# Patient Record
Sex: FEMALE | Race: WHITE | Employment: UNEMPLOYED | URBAN - METROPOLITAN AREA
[De-identification: names, ages, dates, MRNs, and addresses within clinical notes are randomized per-mention and may not be internally consistent; named-entity substitution may affect disease eponyms.]

---

## 2017-01-19 ENCOUNTER — HOSPITAL ENCOUNTER (EMERGENCY)
Facility: HOSPITAL | Age: 57
Discharge: HOME | End: 2017-01-20
Attending: EMERGENCY MEDICINE
Payer: COMMERCIAL

## 2017-01-19 ENCOUNTER — APPOINTMENT (OUTPATIENT)
Dept: GENERAL RADIOLOGY | Facility: HOSPITAL | Age: 57
End: 2017-01-19
Payer: COMMERCIAL

## 2017-01-19 VITALS
SYSTOLIC BLOOD PRESSURE: 126 MMHG | DIASTOLIC BLOOD PRESSURE: 74 MMHG | RESPIRATION RATE: 16 BRPM | BODY MASS INDEX: 32.21 KG/M2 | WEIGHT: 225 LBS | OXYGEN SATURATION: 95 % | HEIGHT: 70 IN | HEART RATE: 67 BPM | TEMPERATURE: 99 F

## 2017-01-19 DIAGNOSIS — T14.8XXA ABRASION: ICD-10-CM

## 2017-01-19 DIAGNOSIS — S80.02XA CONTUSION OF LEFT KNEE, INITIAL ENCOUNTER: Primary | ICD-10-CM

## 2017-01-19 PROCEDURE — 99283 EMERGENCY DEPT VISIT LOW MDM: CPT

## 2017-01-19 PROCEDURE — 90471 IMMUNIZATION ADMIN: CPT

## 2017-01-19 PROCEDURE — 73560 X-RAY EXAM OF KNEE 1 OR 2: CPT

## 2017-01-19 RX ORDER — IBUPROFEN 600 MG/1
600 TABLET ORAL EVERY 8 HOURS PRN
Qty: 30 TABLET | Refills: 0 | Status: SHIPPED | OUTPATIENT
Start: 2017-01-19 | End: 2017-01-26

## 2017-01-19 RX ORDER — HYDROCODONE BITARTRATE AND ACETAMINOPHEN 5; 325 MG/1; MG/1
2 TABLET ORAL ONCE
Status: COMPLETED | OUTPATIENT
Start: 2017-01-19 | End: 2017-01-19

## 2017-01-20 NOTE — ED INITIAL ASSESSMENT (HPI)
Patient fell around [phone] pm trying to get into her car, landing on left knee. Denies feeling dizzy prior to fall.

## 2017-01-20 NOTE — ED PROVIDER NOTES
Patient Seen in: [facility] Emergency Department    History   Patient presents with:  Fall (musculoskeletal, neurologic)  Lower Extremity Injury (musculoskeletal)    Stated Complaint: FALL    HPI    The patient is a 58-year-old female complaining of Father 78     Lung cancer   • Other[other] [OTHER] Son      MVA   • Cancer Son    • Cancer Maternal Grandmother      Skin cancer   • Other[other] [OTHER] Maternal Grandfather      TB   • Heart Disorder Paternal Grandmother 76     Heart attack   • Psychiatr throughout abdomen. No CVA tenderness. Back: No midline step-offs. No midline tenderness. Extremities: No clavicle tenderness. Moderate sized contusion on the proximal tibia of the left lower extremity with a small superficial hemostatic abrasion.   No encounter  (primary encounter diagnosis)  Abrasion    Disposition:  Discharge    Follow-up:  Cam Guzman MD  [address] [place] [id number] [phone]    Call in 2 days        Medications Prescribed:  Discharge Medication List as

## 2017-03-19 ENCOUNTER — HOSPITAL ENCOUNTER (EMERGENCY)
Facility: HOSPITAL | Age: 57
Discharge: HOME | End: 2017-03-19
Attending: EMERGENCY MEDICINE
Payer: COMMERCIAL

## 2017-03-19 VITALS
BODY MASS INDEX: 34.8 KG/M2 | WEIGHT: 235 LBS | HEIGHT: 69 IN | HEART RATE: 74 BPM | SYSTOLIC BLOOD PRESSURE: 136 MMHG | OXYGEN SATURATION: 95 % | TEMPERATURE: 99 F | DIASTOLIC BLOOD PRESSURE: 68 MMHG | RESPIRATION RATE: 16 BRPM

## 2017-03-19 DIAGNOSIS — N30.00 ACUTE CYSTITIS WITHOUT HEMATURIA: Primary | ICD-10-CM

## 2017-03-19 LAB
ALBUMIN SERPL-MCNC: 4.1 G/DL (ref 3.5–4.8)
ALP LIVER SERPL-CCNC: 62 U/L (ref 46–118)
ALT SERPL-CCNC: 39 U/L (ref 14–54)
AST SERPL-CCNC: 22 U/L (ref 15–41)
BASOPHILS # BLD AUTO: 0.06 X10(3) UL (ref 0–0.1)
BASOPHILS NFR BLD AUTO: 0.7 %
BILIRUB SERPL-MCNC: 0.4 MG/DL (ref 0.1–2)
BILIRUB UR QL STRIP.AUTO: NEGATIVE
BUN BLD-MCNC: 16 MG/DL (ref 8–20)
CALCIUM BLD-MCNC: 9.9 MG/DL (ref 8.3–10.3)
CHLORIDE: 103 MMOL/L (ref 101–111)
CLARITY UR REFRACT.AUTO: CLEAR
CO2: 32 MMOL/L (ref 22–32)
CREAT BLD-MCNC: 0.75 MG/DL (ref 0.55–1.02)
EOSINOPHIL # BLD AUTO: 0.13 X10(3) UL (ref 0–0.3)
EOSINOPHIL NFR BLD AUTO: 1.4 %
ERYTHROCYTE [DISTWIDTH] IN BLOOD BY AUTOMATED COUNT: 12.5 % (ref 11.5–16)
GLUCOSE BLD-MCNC: 93 MG/DL (ref 70–99)
GLUCOSE UR STRIP.AUTO-MCNC: NEGATIVE MG/DL
HCT VFR BLD AUTO: 40.3 % (ref 34–50)
HGB BLD-MCNC: 14 G/DL (ref 12–16)
IMMATURE GRANULOCYTE COUNT: 0.03 X10(3) UL (ref 0–1)
IMMATURE GRANULOCYTE RATIO %: 0.3 %
KETONES UR STRIP.AUTO-MCNC: NEGATIVE MG/DL
LACTIC ACID: 1.3 MMOL/L (ref 0.5–2)
LACTIC ACID: 2.4 MMOL/L (ref 0.5–2)
LYMPHOCYTES # BLD AUTO: 2.93 X10(3) UL (ref 0.9–4)
LYMPHOCYTES NFR BLD AUTO: 31.8 %
M PROTEIN MFR SERPL ELPH: 7.3 G/DL (ref 6.1–8.3)
MCH RBC QN AUTO: 30.9 PG (ref 27–33.2)
MCHC RBC AUTO-ENTMCNC: 34.7 G/DL (ref 31–37)
MCV RBC AUTO: 89 FL (ref 81–100)
MONOCYTES # BLD AUTO: 0.56 X10(3) UL (ref 0.1–0.6)
MONOCYTES NFR BLD AUTO: 6.1 %
NEUTROPHIL ABS PRELIM: 5.5 X10 (3) UL (ref 1.3–6.7)
NEUTROPHILS # BLD AUTO: 5.5 X10(3) UL (ref 1.3–6.7)
NEUTROPHILS NFR BLD AUTO: 59.7 %
NITRITE UR QL STRIP.AUTO: POSITIVE
PH UR STRIP.AUTO: 6 [PH] (ref 4.5–8)
PLATELET # BLD AUTO: 344 10(3)UL (ref 150–450)
POTASSIUM SERPL-SCNC: 3.6 MMOL/L (ref 3.6–5.1)
PROT UR STRIP.AUTO-MCNC: NEGATIVE MG/DL
RBC # BLD AUTO: 4.53 X10(6)UL (ref 3.8–5.1)
RED CELL DISTRIBUTION WIDTH-SD: 40.7 FL (ref 35.1–46.3)
SODIUM SERPL-SCNC: 143 MMOL/L (ref 136–144)
SP GR UR STRIP.AUTO: 1.02 (ref 1–1.03)
UROBILINOGEN UR STRIP.AUTO-MCNC: <2 MG/DL
WBC # BLD AUTO: 9.2 X10(3) UL (ref 4–13)

## 2017-03-19 PROCEDURE — 87088 URINE BACTERIA CULTURE: CPT | Performed by: INTERNAL MEDICINE

## 2017-03-19 PROCEDURE — 80053 COMPREHEN METABOLIC PANEL: CPT | Performed by: EMERGENCY MEDICINE

## 2017-03-19 PROCEDURE — 36415 COLL VENOUS BLD VENIPUNCTURE: CPT

## 2017-03-19 PROCEDURE — 96366 THER/PROPH/DIAG IV INF ADDON: CPT

## 2017-03-19 PROCEDURE — 99284 EMERGENCY DEPT VISIT MOD MDM: CPT

## 2017-03-19 PROCEDURE — 87086 URINE CULTURE/COLONY COUNT: CPT | Performed by: INTERNAL MEDICINE

## 2017-03-19 PROCEDURE — 83605 ASSAY OF LACTIC ACID: CPT | Performed by: EMERGENCY MEDICINE

## 2017-03-19 PROCEDURE — 87040 BLOOD CULTURE FOR BACTERIA: CPT | Performed by: EMERGENCY MEDICINE

## 2017-03-19 PROCEDURE — 87186 SC STD MICRODIL/AGAR DIL: CPT | Performed by: INTERNAL MEDICINE

## 2017-03-19 PROCEDURE — 81001 URINALYSIS AUTO W/SCOPE: CPT | Performed by: EMERGENCY MEDICINE

## 2017-03-19 PROCEDURE — 96365 THER/PROPH/DIAG IV INF INIT: CPT

## 2017-03-19 PROCEDURE — 85025 COMPLETE CBC W/AUTO DIFF WBC: CPT | Performed by: EMERGENCY MEDICINE

## 2017-03-19 PROCEDURE — 87040 BLOOD CULTURE FOR BACTERIA: CPT | Performed by: INTERNAL MEDICINE

## 2017-03-19 RX ORDER — LEVOFLOXACIN 5 MG/ML
750 INJECTION, SOLUTION INTRAVENOUS ONCE
Status: COMPLETED | OUTPATIENT
Start: 2017-03-19 | End: 2017-03-19

## 2017-03-19 RX ORDER — LEVOFLOXACIN 500 MG/1
500 TABLET, FILM COATED ORAL DAILY
Qty: 7 TABLET | Refills: 0 | Status: SHIPPED | OUTPATIENT
Start: 2017-03-19 | End: 2017-03-21 | Stop reason: CLARIF

## 2017-03-19 RX ORDER — LORAZEPAM 1 MG/1
1 TABLET ORAL 2 TIMES DAILY PRN
Qty: 20 TABLET | Refills: 0 | Status: SHIPPED | OUTPATIENT
Start: 2017-03-19 | End: 2017-03-26

## 2017-03-19 NOTE — ED PROVIDER NOTES
Patient Seen in: [facility] Emergency Department    History   Patient presents with:  Urinary Symptoms (urologic)    Stated Complaint: urinary s/s, sent from immed care for iv abx    HPI    25-year-old female presents emergency department who has ur EXTRACT OR,  Take 1 capsule by mouth daily.        Family History   Problem Relation Age of Onset   • Cancer Father 78     Lung cancer   • Other[other] [OTHER] Son      MVA   • Cancer Son    • Cancer Maternal Grandmother      Skin cancer   • Other[other] [O sounds are present , no pulsatile mass  Extremities: No clubbing cyanosis or edema 2+ distal pulses. Neuro: Cranial nerves II through XII intact with no gross focal sensory or motor abnormality.       ED Course     Labs Reviewed   URINALYSIS WITH CULTURE R because of her mother is in the hospital on palliative care. She was hoping to possibly get a little something to help her sleep or help with stress. I told her I could give her a prescription for Ativan.   MDM       Patient was made aware of medical cond

## 2017-03-19 NOTE — PROGRESS NOTES
Pharmacy Note:   Antimicrobial Weight Dose Adjustment for: Leighton Castellanos has been prescribed Levaquin 500 mg IV x 1 in the ER. CrCl cannot be calculated (Patient has no serum creatinine result on file. ). Weight = 106. 6

## 2017-03-19 NOTE — ED INITIAL ASSESSMENT (HPI)
Urinary symptoms since yesterday; burning, frequency, urgency and pelvic pain. Hx of UTIs in the past. Went to [facility] after hours care and was sent here. Patient reports MD told her she may have bloodstream infection and needed IV Abx.

## 2017-07-07 ENCOUNTER — OFFICE VISIT (OUTPATIENT)
Dept: FAMILY MEDICINE CLINIC | Facility: CLINIC | Age: 57
End: 2017-07-07

## 2017-07-07 VITALS
WEIGHT: 241 LBS | OXYGEN SATURATION: 99 % | DIASTOLIC BLOOD PRESSURE: 70 MMHG | HEART RATE: 94 BPM | SYSTOLIC BLOOD PRESSURE: 130 MMHG | TEMPERATURE: 98 F | BODY MASS INDEX: 34.5 KG/M2 | HEIGHT: 70 IN | RESPIRATION RATE: 20 BRPM

## 2017-07-07 DIAGNOSIS — N30.90 BLADDER INFECTION: ICD-10-CM

## 2017-07-07 DIAGNOSIS — R30.0 DYSURIA: Primary | ICD-10-CM

## 2017-07-07 LAB
APPEARANCE: CLEAR
BILIRUBIN: NEGATIVE
GLUCOSE (URINE DIPSTICK): NEGATIVE MG/DL
KETONES (URINE DIPSTICK): NEGATIVE MG/DL
MULTISTIX LOT#: NORMAL NUMERIC
NITRITE, URINE: POSITIVE
PH, URINE: 6 (ref 4.5–8)
PROTEIN (URINE DIPSTICK): NEGATIVE MG/DL
SPECIFIC GRAVITY: 1.01 (ref 1–1.03)
URINE-COLOR: YELLOW
UROBILINOGEN,SEMI-QN: 0.2 MG/DL (ref 0–1.9)

## 2017-07-07 PROCEDURE — 87186 SC STD MICRODIL/AGAR DIL: CPT | Performed by: NURSE PRACTITIONER

## 2017-07-07 PROCEDURE — 99213 OFFICE O/P EST LOW 20 MIN: CPT | Performed by: NURSE PRACTITIONER

## 2017-07-07 PROCEDURE — 81003 URINALYSIS AUTO W/O SCOPE: CPT | Performed by: NURSE PRACTITIONER

## 2017-07-07 PROCEDURE — 87086 URINE CULTURE/COLONY COUNT: CPT | Performed by: NURSE PRACTITIONER

## 2017-07-07 PROCEDURE — 87088 URINE BACTERIA CULTURE: CPT | Performed by: NURSE PRACTITIONER

## 2017-07-07 RX ORDER — NITROFURANTOIN 25; 75 MG/1; MG/1
CAPSULE ORAL
Qty: 14 CAPSULE | Refills: 0 | Status: SHIPPED | OUTPATIENT
Start: 2017-07-07 | End: 2017-09-07 | Stop reason: ALTCHOICE

## 2017-07-07 NOTE — PROGRESS NOTES
CHIEF COMPLAINT:   Patient presents with:  Urinary Frequency: s/s since HS  Burning On Urination: took 1 tab of AMOX from previously prescribed      HPI:   Melanie Leon is a 59year old female who presents with symptoms of UTI.  Complaining of urinary Smoking status: Former Smoker                                                              Packs/day: 0.00      Years: 0.00      Smokeless tobacco: Never Used                      Alcohol use:  No                  REVIEW OF SYSTEMS:   GENERAL: Denies fever, - Will send urine culture and contact pt with results. - Meds as listed below. - Comfort measures as described in Patient Instructions. - Advised F/U visit if no improvement/worsening within 2-3 days.   To ER if ever moderate to severe abdominal/flan · Abdominal discomfort. This is usually in the lower abdomen above the pubic bone.   · Cloudy urine  · Strong- or bad-smelling urine  · Unable to urinate (urinary retention)  · Unable to hold urine in (urinary incontinence)  · Fever  · Loss of appetite  · C · If you are given phenazopydridine to reduce burning with urination, it will cause your urine to become a bright orange color. This can stain clothing.   Care and prevention  These self-care steps can help prevent future infections:  · Drink plenty of flui · Repeated vomiting, or unable to keep medicine down  · Weakness or dizziness  · Vaginal discharge  · Pain, redness, or swelling in the outer vaginal area (labia)  Date Last Reviewed: 10/1/2016  © 5586-5229 The [address]

## 2017-09-07 ENCOUNTER — OFFICE VISIT (OUTPATIENT)
Dept: FAMILY MEDICINE CLINIC | Facility: CLINIC | Age: 57
End: 2017-09-07

## 2017-09-07 VITALS
WEIGHT: 241 LBS | HEART RATE: 67 BPM | DIASTOLIC BLOOD PRESSURE: 70 MMHG | HEIGHT: 70 IN | SYSTOLIC BLOOD PRESSURE: 134 MMHG | RESPIRATION RATE: 18 BRPM | OXYGEN SATURATION: 98 % | BODY MASS INDEX: 34.5 KG/M2 | TEMPERATURE: 98 F

## 2017-09-07 DIAGNOSIS — N30.90 BLADDER INFECTION: Primary | ICD-10-CM

## 2017-09-07 LAB
MULTISTIX LOT#: NORMAL NUMERIC
PH, URINE: 6 (ref 4.5–8)
SPECIFIC GRAVITY: 1.01 (ref 1–1.03)
URINE-COLOR: YELLOW
UROBILINOGEN,SEMI-QN: 0.2 MG/DL (ref 0–1.9)

## 2017-09-07 PROCEDURE — 87086 URINE CULTURE/COLONY COUNT: CPT | Performed by: NURSE PRACTITIONER

## 2017-09-07 PROCEDURE — 87088 URINE BACTERIA CULTURE: CPT | Performed by: NURSE PRACTITIONER

## 2017-09-07 PROCEDURE — 87186 SC STD MICRODIL/AGAR DIL: CPT | Performed by: NURSE PRACTITIONER

## 2017-09-07 PROCEDURE — 99213 OFFICE O/P EST LOW 20 MIN: CPT | Performed by: NURSE PRACTITIONER

## 2017-09-07 PROCEDURE — 81003 URINALYSIS AUTO W/O SCOPE: CPT | Performed by: NURSE PRACTITIONER

## 2017-09-07 RX ORDER — NITROFURANTOIN 25; 75 MG/1; MG/1
CAPSULE ORAL
Qty: 14 CAPSULE | Refills: 0 | Status: SHIPPED | OUTPATIENT
Start: 2017-09-07 | End: 2017-11-19 | Stop reason: ALTCHOICE

## 2017-09-07 NOTE — PROGRESS NOTES
CHIEF COMPLAINT:   Patient presents with:  UTI: frequency and burning on urination, low back pain x 1 day       HPI:   Yinka Inman is a 59year old female who presents with symptoms of UTI.  Complaining of urinary frequency, urgency, slight low back p Packs/day: 0.00      Years: 0.00      Smokeless tobacco: Never Used                      Alcohol use: No                  REVIEW OF SYSTEMS:   GENERAL: Denies fever, chills, or body aches  SKIN: no rashes, no skin wounds or ulcers.   EYES:denies blurred - Last culture was multi drug resistant. Macrobid and bactrim were sensitive. Pt prefers the Macrobid for now- tolerates it well and feels she failed on bactrim a few times in the past.  - Comfort measures as described in Patient Instructions.     - Advis · Only a small amount of urine comes out  · Blood in urine  · Abdominal discomfort. This is usually in the lower abdomen above the pubic bone.   · Cloudy urine  · Strong- or bad-smelling urine  · Unable to urinate (urinary retention)  · Unable to hold urine · If you are given phenazopydridine to reduce burning with urination, it will cause your urine to become a bright orange color. This can stain clothing.   Care and prevention  These self-care steps can help prevent future infections:  · Drink plenty of flui · Repeated vomiting, or unable to keep medicine down  · Weakness or dizziness  · Vaginal discharge  · Pain, redness, or swelling in the outer vaginal area (labia)  Date Last Reviewed: 10/1/2016  © 1842-2421 The [address]

## 2017-09-14 ENCOUNTER — TELEPHONE (OUTPATIENT)
Dept: FAMILY MEDICINE CLINIC | Facility: CLINIC | Age: 57
End: 2017-09-14

## 2017-09-14 NOTE — TELEPHONE ENCOUNTER
Pt calling [facility], wondering about urine culture results from 1 week ago. Per chart, [facility] per pt she doesn't always check mychart. Informed of urine culture results and should finish out the macrobid.   Pt verbalizes understand

## 2017-11-19 ENCOUNTER — OFFICE VISIT (OUTPATIENT)
Dept: FAMILY MEDICINE CLINIC | Facility: CLINIC | Age: 57
End: 2017-11-19

## 2017-11-19 VITALS
HEART RATE: 87 BPM | RESPIRATION RATE: 20 BRPM | DIASTOLIC BLOOD PRESSURE: 76 MMHG | SYSTOLIC BLOOD PRESSURE: 122 MMHG | HEIGHT: 69 IN | WEIGHT: 241 LBS | TEMPERATURE: 99 F | OXYGEN SATURATION: 100 % | BODY MASS INDEX: 35.7 KG/M2

## 2017-11-19 DIAGNOSIS — R35.0 FREQUENT URINATION: ICD-10-CM

## 2017-11-19 DIAGNOSIS — N30.01 ACUTE CYSTITIS WITH HEMATURIA: Primary | ICD-10-CM

## 2017-11-19 PROCEDURE — 87086 URINE CULTURE/COLONY COUNT: CPT | Performed by: NURSE PRACTITIONER

## 2017-11-19 PROCEDURE — 81003 URINALYSIS AUTO W/O SCOPE: CPT | Performed by: NURSE PRACTITIONER

## 2017-11-19 PROCEDURE — 99213 OFFICE O/P EST LOW 20 MIN: CPT | Performed by: NURSE PRACTITIONER

## 2017-11-19 RX ORDER — NITROFURANTOIN 25; 75 MG/1; MG/1
100 CAPSULE ORAL 2 TIMES DAILY
Qty: 10 CAPSULE | Refills: 0 | Status: SHIPPED | OUTPATIENT
Start: 2017-11-19 | End: 2017-11-24

## 2017-11-19 RX ORDER — PHENAZOPYRIDINE HYDROCHLORIDE 200 MG/1
200 TABLET, FILM COATED ORAL 3 TIMES DAILY PRN
Qty: 9 TABLET | Refills: 0 | Status: SHIPPED | OUTPATIENT
Start: 2017-11-19 | End: 2017-12-26

## 2017-11-19 NOTE — PROGRESS NOTES
Magi Watts is a 59year old female. HPI:   Patient presents with symptoms of UTI for 2 days. Complaining of urinary frequency, urgency, dysuria, pos suprapubic pain, fever to 99.1,    Denies back pain, fever, hematuria.   Pt has history of UTI in REVIEW OF SYSTEMS:   GENERAL HEALTH: no  fever/chills or fatigue  SKIN: denies any unusual skin lesions or rashes  RESPIRATORY: no shortness of breath with exertion  CARDIOVASCULAR: denies chest pain on exertion and palpitations.   GI: no nausea or vomiting · Sexual activity. During sex, bacteria can travel from the penis, vagina, or rectum into the urethra.   · Bacteria on the skin outside the rectum may travel into the urethra.  This is more common in women since the rectum and urethra are closer to each oth

## 2017-11-19 NOTE — PATIENT INSTRUCTIONS
Understanding Urinary Tract Infections (UTIs)  Most UTIs are caused by bacteria, although they may also be caused by viruses or fungi.  Bacteria from the bowel are the most common source of infection. The infection may start because of any of the followin

## 2017-12-26 PROCEDURE — 87086 URINE CULTURE/COLONY COUNT: CPT | Performed by: FAMILY MEDICINE

## 2017-12-26 PROCEDURE — 87186 SC STD MICRODIL/AGAR DIL: CPT | Performed by: FAMILY MEDICINE

## 2017-12-26 PROCEDURE — 87088 URINE BACTERIA CULTURE: CPT | Performed by: FAMILY MEDICINE

## 2018-03-03 ENCOUNTER — HOSPITAL ENCOUNTER (EMERGENCY)
Facility: HOSPITAL | Age: 58
Discharge: HOME | End: 2018-03-03
Attending: EMERGENCY MEDICINE
Payer: COMMERCIAL

## 2018-03-03 ENCOUNTER — APPOINTMENT (OUTPATIENT)
Dept: ULTRASOUND IMAGING | Facility: HOSPITAL | Age: 58
End: 2018-03-03
Attending: EMERGENCY MEDICINE
Payer: COMMERCIAL

## 2018-03-03 ENCOUNTER — APPOINTMENT (OUTPATIENT)
Dept: GENERAL RADIOLOGY | Facility: HOSPITAL | Age: 58
End: 2018-03-03
Attending: EMERGENCY MEDICINE
Payer: COMMERCIAL

## 2018-03-03 VITALS
TEMPERATURE: 98 F | RESPIRATION RATE: 20 BRPM | OXYGEN SATURATION: 97 % | HEART RATE: 81 BPM | HEIGHT: 70 IN | DIASTOLIC BLOOD PRESSURE: 66 MMHG | SYSTOLIC BLOOD PRESSURE: 118 MMHG | WEIGHT: 235 LBS | BODY MASS INDEX: 33.64 KG/M2

## 2018-03-03 DIAGNOSIS — M71.21 SYNOVIAL CYST OF RIGHT POPLITEAL SPACE: Primary | ICD-10-CM

## 2018-03-03 PROCEDURE — 99284 EMERGENCY DEPT VISIT MOD MDM: CPT

## 2018-03-03 PROCEDURE — 93971 EXTREMITY STUDY: CPT | Performed by: EMERGENCY MEDICINE

## 2018-03-03 PROCEDURE — 73560 X-RAY EXAM OF KNEE 1 OR 2: CPT | Performed by: EMERGENCY MEDICINE

## 2018-03-03 NOTE — ED PROVIDER NOTES
Patient Seen in: [facility] Emergency Department    History   Patient presents with:  Lower Extremity Injury (musculoskeletal)    Stated Complaint: knee pain    HPI    Patient presents complaining of a one-week history of right knee pain surrounding popliteal region extending into the proximal calf. There is no distal edema noted. Distal CMS exam is intact. No joint instability is noted. There is no ecchymosis. There is no thigh or hip discomfort   Nursing note and vitals reviewed.            ED C

## 2018-03-03 NOTE — ED INITIAL ASSESSMENT (HPI)
60 y/o female to ED with c/o of right posterior knee pain x1 week approximately, patient denies injury. Reports dull pressure that turns to a sharp pain when getting up from chair/stool.

## 2018-03-14 PROCEDURE — 85652 RBC SED RATE AUTOMATED: CPT | Performed by: FAMILY MEDICINE

## 2018-03-14 PROCEDURE — 36415 COLL VENOUS BLD VENIPUNCTURE: CPT | Performed by: FAMILY MEDICINE

## 2018-04-18 PROBLEM — M17.11 PRIMARY OSTEOARTHRITIS OF RIGHT KNEE: Status: ACTIVE | Noted: 2018-04-18

## 2018-04-18 PROBLEM — M25.461 EFFUSION OF KNEE JOINT RIGHT: Status: ACTIVE | Noted: 2018-04-18

## 2018-05-11 ENCOUNTER — OFFICE VISIT (OUTPATIENT)
Dept: FAMILY MEDICINE CLINIC | Facility: CLINIC | Age: 58
End: 2018-05-11

## 2018-05-11 VITALS
TEMPERATURE: 99 F | RESPIRATION RATE: 20 BRPM | DIASTOLIC BLOOD PRESSURE: 76 MMHG | BODY MASS INDEX: 35.21 KG/M2 | OXYGEN SATURATION: 98 % | HEART RATE: 88 BPM | HEIGHT: 68.5 IN | SYSTOLIC BLOOD PRESSURE: 112 MMHG | WEIGHT: 235 LBS

## 2018-05-11 DIAGNOSIS — L30.9 DERMATITIS: Primary | ICD-10-CM

## 2018-05-11 PROCEDURE — 99213 OFFICE O/P EST LOW 20 MIN: CPT | Performed by: NURSE PRACTITIONER

## 2018-05-11 RX ORDER — CLOTRIMAZOLE AND BETAMETHASONE DIPROPIONATE 10; .64 MG/G; MG/G
1 CREAM TOPICAL 2 TIMES DAILY
Qty: 60 G | Refills: 0 | Status: SHIPPED | OUTPATIENT
Start: 2018-05-11 | End: 2018-05-25

## 2018-05-11 NOTE — PATIENT INSTRUCTIONS
Contact Dermatitis  Contact dermatitis is a skin rash caused by something that touches the skin and makes it irritated and inflamed. Your skin may be red, swollen, dry, and may be cracked. Blisters may form and ooze. The rash will itch.   Contact dermatit · You can also try wet dressings. One way to do this is to wear a wet piece of clothing under a dry one. Wear a damp shirt under a dry shirt if your upper body is affected. This can relieve itching and prevent you from scratching the affected area.   · You © 2108-6115 The [address] [facility] [place]. All rights reserved. This information is not intended as a substitute for professional medical care. Always follow your healthcare professional's instructions.

## 2018-05-11 NOTE — PROGRESS NOTES
CHIEF COMPLAINT:   Patient presents with:  Rash Skin Problem (integumentary): left underarm burning, red, and hot x1day         HPI:   Sailaja Penny is a 59year old female who presents for evaluation of a rash.   Per patient rash started in the past 1 Omeprazole 40 MG Oral Capsule Delayed Release Take 1 capsule (40 mg total) by mouth once daily. Disp: 90 capsule Rfl: 3   CRANBERRY EXTRACT OR Take 1 capsule by mouth daily.  Disp:  Rfl:       Past Medical History:   Diagnosis Date   • Back pain    • Other GENERAL: well developed, well nourished,in no apparent distress  SKIN: left axillar very erythematous with some scaling to edges of erythema, raised. NO vesicles, papules palpated. No masses, fluctance.    EYES: PERRLA, EOMI, conjunctiva are clear  HENT: He Talk with your healthcare provider about what may have caused the rash. A type of allergy testing called \"patch testing\" may be used to discover what you are allergic to.  You will need to avoid the source of your rash in the future to prevent it from com · Use oral diphenhydramine to help reduce itching. You can buy this antihistamine at drug and grocery stores. It can make you sleepy, so use lower doses during the daytime. Or you can use loratadine. This is an antihistamine that will not make you sleepy.

## 2018-05-22 PROBLEM — M87.9 OSTEONECROSIS OF RIGHT KNEE REGION (HCC): Status: ACTIVE | Noted: 2018-05-22

## 2018-05-22 PROBLEM — S83.241D ACUTE MEDIAL MENISCAL TEAR, RIGHT, SUBSEQUENT ENCOUNTER: Status: ACTIVE | Noted: 2018-05-22

## 2018-06-02 ENCOUNTER — HOSPITAL ENCOUNTER (EMERGENCY)
Facility: HOSPITAL | Age: 58
Discharge: HOME | End: 2018-06-02
Attending: EMERGENCY MEDICINE
Payer: COMMERCIAL

## 2018-06-02 VITALS
RESPIRATION RATE: 17 BRPM | WEIGHT: 240 LBS | TEMPERATURE: 98 F | SYSTOLIC BLOOD PRESSURE: 144 MMHG | BODY MASS INDEX: 34.36 KG/M2 | HEIGHT: 70 IN | HEART RATE: 85 BPM | DIASTOLIC BLOOD PRESSURE: 80 MMHG | OXYGEN SATURATION: 96 %

## 2018-06-02 DIAGNOSIS — N30.00 ACUTE CYSTITIS WITHOUT HEMATURIA: Primary | ICD-10-CM

## 2018-06-02 PROCEDURE — 81001 URINALYSIS AUTO W/SCOPE: CPT | Performed by: EMERGENCY MEDICINE

## 2018-06-02 PROCEDURE — 87186 SC STD MICRODIL/AGAR DIL: CPT | Performed by: EMERGENCY MEDICINE

## 2018-06-02 PROCEDURE — 87077 CULTURE AEROBIC IDENTIFY: CPT | Performed by: EMERGENCY MEDICINE

## 2018-06-02 PROCEDURE — 99283 EMERGENCY DEPT VISIT LOW MDM: CPT

## 2018-06-02 PROCEDURE — 87086 URINE CULTURE/COLONY COUNT: CPT | Performed by: EMERGENCY MEDICINE

## 2018-06-02 RX ORDER — SULFAMETHOXAZOLE AND TRIMETHOPRIM 800; 160 MG/1; MG/1
1 TABLET ORAL 2 TIMES DAILY
Qty: 14 TABLET | Refills: 0 | Status: SHIPPED | OUTPATIENT
Start: 2018-06-02 | End: 2018-06-09

## 2018-06-02 NOTE — ED PROVIDER NOTES
Patient Seen in: [facility] Emergency Department    History   Patient presents with:  Urinary Symptoms (urologic)    Stated Complaint: urinary tract infection    HPI    70-year-old female comes the hospital complaint of having 1 day history of havin LAD  Heart: S1S2 normal. No murmurs, regular rate and rhythm  Lungs: Clear to auscultation bilaterally  Abdomen: Soft nontender nondistended normal active bowel sounds without rebound, guarding or masses noted  Back nontender without CVA tenderness  Extrem [id number]  [phone]    Schedule an appointment as soon as possible for a visit in 2 days          Medications Prescribed:  Current Discharge Medication List    START taking these medications    Sulfamethoxazole-TMP -160 MG Oral Tab per tablet  Take 1

## 2018-06-02 NOTE — ED INITIAL ASSESSMENT (HPI)
Onset last night, progressive in nature. +Urinary frequency, burning, back ache noted. Denies fevers.

## 2018-06-25 ENCOUNTER — HOSPITAL ENCOUNTER (OUTPATIENT)
Dept: ULTRASOUND IMAGING | Age: 58
Discharge: HOME | End: 2018-06-25
Attending: ORTHOPAEDIC SURGERY
Payer: COMMERCIAL

## 2018-06-25 ENCOUNTER — HOSPITAL ENCOUNTER (EMERGENCY)
Facility: HOSPITAL | Age: 58
Discharge: HOME | End: 2018-06-25
Attending: EMERGENCY MEDICINE
Payer: COMMERCIAL

## 2018-06-25 ENCOUNTER — APPOINTMENT (OUTPATIENT)
Dept: GENERAL RADIOLOGY | Facility: HOSPITAL | Age: 58
End: 2018-06-25
Attending: EMERGENCY MEDICINE
Payer: COMMERCIAL

## 2018-06-25 VITALS
BODY MASS INDEX: 34.8 KG/M2 | DIASTOLIC BLOOD PRESSURE: 79 MMHG | HEART RATE: 64 BPM | RESPIRATION RATE: 18 BRPM | HEIGHT: 69 IN | SYSTOLIC BLOOD PRESSURE: 131 MMHG | WEIGHT: 235 LBS | OXYGEN SATURATION: 99 % | TEMPERATURE: 98 F

## 2018-06-25 DIAGNOSIS — M25.561 CHRONIC PAIN OF RIGHT KNEE: Primary | ICD-10-CM

## 2018-06-25 DIAGNOSIS — M79.661 RIGHT CALF PAIN: ICD-10-CM

## 2018-06-25 DIAGNOSIS — M25.561 POSTERIOR KNEE PAIN, RIGHT: ICD-10-CM

## 2018-06-25 DIAGNOSIS — G89.29 CHRONIC PAIN OF RIGHT KNEE: Primary | ICD-10-CM

## 2018-06-25 PROCEDURE — 99283 EMERGENCY DEPT VISIT LOW MDM: CPT

## 2018-06-25 PROCEDURE — 73562 X-RAY EXAM OF KNEE 3: CPT | Performed by: EMERGENCY MEDICINE

## 2018-06-25 PROCEDURE — 93971 EXTREMITY STUDY: CPT | Performed by: ORTHOPAEDIC SURGERY

## 2018-06-25 RX ORDER — IBUPROFEN 600 MG/1
600 TABLET ORAL ONCE
Status: COMPLETED | OUTPATIENT
Start: 2018-06-25 | End: 2018-06-25

## 2018-06-25 RX ORDER — HYDROCODONE BITARTRATE AND ACETAMINOPHEN 5; 325 MG/1; MG/1
1 TABLET ORAL ONCE
Status: COMPLETED | OUTPATIENT
Start: 2018-06-25 | End: 2018-06-25

## 2018-06-25 RX ORDER — TRAMADOL HYDROCHLORIDE 50 MG/1
TABLET ORAL EVERY 4 HOURS PRN
Qty: 10 TABLET | Refills: 0 | Status: SHIPPED | OUTPATIENT
Start: 2018-06-25 | End: 2018-07-02

## 2018-06-25 RX ORDER — IBUPROFEN 800 MG/1
800 TABLET ORAL EVERY 8 HOURS PRN
Qty: 30 TABLET | Refills: 0 | Status: SHIPPED | OUTPATIENT
Start: 2018-06-25 | End: 2018-07-02

## 2018-06-25 NOTE — ED INITIAL ASSESSMENT (HPI)
Patient has a history of osteonecrosis of the right knee  She wanted to give it some time to see if it would improve.  Today she walked to work and all of a sudden she could not bear weight

## 2018-06-25 NOTE — ED PROVIDER NOTES
Patient Seen in: [facility] Emergency Department    History   Patient presents with:  Lower Extremity Injury (musculoskeletal)    Stated Complaint: Reoccuring issues with right knee.     HPI    75-year-old white female with a past medical history of Systems    Positive for stated complaint: Reoccuring issues with right knee. Other systems are as noted in HPI. Constitutional and vital signs reviewed. All other systems reviewed and negative except as noted above.     Physical Exam   ED Triage Elida ibuprofen and 2 Norco here in the emergency department a prescription for tramadol for home but I do think that the likelihood of her having a DVT was very low given that she has no risk factors similar pain in the past with a similar presentation and a ne

## 2018-06-26 NOTE — PROGRESS NOTES
CONCLUSION:    1.  Negative DVT study. 2.  No significant change in appearance of a Baker's cyst and suprapatellar joint fluid.

## 2018-06-28 PROBLEM — M84.453A INSUFFICIENCY FRACTURE OF MEDIAL FEMORAL CONDYLE (HCC): Status: ACTIVE | Noted: 2018-06-28

## 2018-06-29 ENCOUNTER — OFFICE VISIT (OUTPATIENT)
Dept: FAMILY MEDICINE CLINIC | Facility: CLINIC | Age: 58
End: 2018-06-29

## 2018-06-29 VITALS
TEMPERATURE: 99 F | HEART RATE: 97 BPM | BODY MASS INDEX: 35 KG/M2 | WEIGHT: 235 LBS | OXYGEN SATURATION: 96 % | DIASTOLIC BLOOD PRESSURE: 84 MMHG | SYSTOLIC BLOOD PRESSURE: 132 MMHG | RESPIRATION RATE: 20 BRPM

## 2018-06-29 DIAGNOSIS — N30.01 ACUTE CYSTITIS WITH HEMATURIA: ICD-10-CM

## 2018-06-29 DIAGNOSIS — R39.9 UTI SYMPTOMS: Primary | ICD-10-CM

## 2018-06-29 PROCEDURE — 87086 URINE CULTURE/COLONY COUNT: CPT | Performed by: NURSE PRACTITIONER

## 2018-06-29 PROCEDURE — 99213 OFFICE O/P EST LOW 20 MIN: CPT | Performed by: NURSE PRACTITIONER

## 2018-06-29 PROCEDURE — 81003 URINALYSIS AUTO W/O SCOPE: CPT | Performed by: NURSE PRACTITIONER

## 2018-06-29 RX ORDER — NITROFURANTOIN 25; 75 MG/1; MG/1
100 CAPSULE ORAL 2 TIMES DAILY
Qty: 14 CAPSULE | Refills: 0 | Status: SHIPPED | OUTPATIENT
Start: 2018-06-29 | End: 2018-07-06

## 2018-06-29 RX ORDER — PHENAZOPYRIDINE HYDROCHLORIDE 200 MG/1
200 TABLET, FILM COATED ORAL 3 TIMES DAILY PRN
Qty: 6 TABLET | Refills: 0 | Status: SHIPPED | OUTPATIENT
Start: 2018-06-29 | End: 2018-07-01

## 2018-06-29 RX ORDER — NITROFURANTOIN 25; 75 MG/1; MG/1
100 CAPSULE ORAL 2 TIMES DAILY
Qty: 14 CAPSULE | Refills: 0 | Status: SHIPPED | OUTPATIENT
Start: 2018-06-29 | End: 2018-06-29 | Stop reason: CLARIF

## 2018-06-29 RX ORDER — SULFAMETHOXAZOLE AND TRIMETHOPRIM 800; 160 MG/1; MG/1
1 TABLET ORAL 2 TIMES DAILY
Qty: 10 TABLET | Refills: 0 | Status: SHIPPED | OUTPATIENT
Start: 2018-06-29 | End: 2018-06-29 | Stop reason: ALTCHOICE

## 2018-06-29 NOTE — PROGRESS NOTES
CHIEF COMPLAINT:   Patient presents with:  UTI: pain when urinating start at 4am,     HPI:   Tao Morataya is a 59year old female who presents with symptoms of UTI. Complaining of urinary frequency, urgency, dysuria for last 1 days.  Symptoms have be Diagnosis Date   • Back pain    • Other and unspecified hyperlipidemia    • Unspecified essential hypertension       Social History:  Smoking status: Former Smoker                                                              Packs/day: 0.00      Years: 0.0 Uti symptoms  (primary encounter diagnosis)  Acute cystitis with hematuria     1. UTI symptoms  - URINALYSIS, AUTO, W/O SCOPE  - Nitrofurantoin Monohyd Macro 100 MG Oral Cap; Take 1 capsule (100 mg total) by mouth 2 (two) times daily.   Dispense: 14 capsule The most common place for an infection is in the bladder. This is called a bladder infection. This is one of the most common infections in women. Most bladder infections are easily treated. They are not serious unless the infection spreads to the kidney. Bladder infections are diagnosed by a urine test. They are treated with antibiotics and usually clear up quickly without complications. Treatment helps prevent a more serious kidney infection.   Medicines  Medicines can help in the treatment of a bladder in Call your healthcare provider if all symptoms are not gone after 3 days of treatment. This is especially important if you have repeat infections. If a culture was done, you will be told if your treatment needs to be changed.  If directed, you can call to f

## 2018-07-01 ENCOUNTER — TELEPHONE (OUTPATIENT)
Dept: FAMILY MEDICINE CLINIC | Facility: CLINIC | Age: 58
End: 2018-07-01

## 2018-07-01 NOTE — TELEPHONE ENCOUNTER
Left detailed VM on phone number provided per HIPAA consent with info regarding negative urine culture and to discontinue antibiotic. Advised to follow up with PCP if continued symptoms. Call back number provided to further discuss.

## 2018-08-19 ENCOUNTER — HOSPITAL ENCOUNTER (EMERGENCY)
Facility: HOSPITAL | Age: 58
Discharge: HOME | End: 2018-08-20
Attending: EMERGENCY MEDICINE
Payer: COMMERCIAL

## 2018-08-19 ENCOUNTER — APPOINTMENT (OUTPATIENT)
Dept: GENERAL RADIOLOGY | Facility: HOSPITAL | Age: 58
End: 2018-08-19
Payer: COMMERCIAL

## 2018-08-19 DIAGNOSIS — S42.291A CLOSED FRACTURE OF HEAD OF RIGHT HUMERUS, INITIAL ENCOUNTER: Primary | ICD-10-CM

## 2018-08-19 PROCEDURE — 96375 TX/PRO/DX INJ NEW DRUG ADDON: CPT

## 2018-08-19 PROCEDURE — 96376 TX/PRO/DX INJ SAME DRUG ADON: CPT

## 2018-08-19 PROCEDURE — 73562 X-RAY EXAM OF KNEE 3: CPT

## 2018-08-19 PROCEDURE — 73060 X-RAY EXAM OF HUMERUS: CPT

## 2018-08-19 PROCEDURE — 96374 THER/PROPH/DIAG INJ IV PUSH: CPT

## 2018-08-19 PROCEDURE — 99285 EMERGENCY DEPT VISIT HI MDM: CPT

## 2018-08-19 PROCEDURE — 99284 EMERGENCY DEPT VISIT MOD MDM: CPT

## 2018-08-19 RX ORDER — MORPHINE SULFATE 4 MG/ML
INJECTION, SOLUTION INTRAMUSCULAR; INTRAVENOUS
Status: COMPLETED
Start: 2018-08-19 | End: 2018-08-20

## 2018-08-19 RX ORDER — HYDROCODONE BITARTRATE AND ACETAMINOPHEN 10; 325 MG/1; MG/1
1-2 TABLET ORAL EVERY 4 HOURS PRN
Qty: 20 TABLET | Refills: 0 | Status: SHIPPED | OUTPATIENT
Start: 2018-08-19 | End: 2018-08-26

## 2018-08-19 RX ORDER — DIAZEPAM 5 MG/ML
INJECTION, SOLUTION INTRAMUSCULAR; INTRAVENOUS EVERY 30 MIN PRN
Status: DISCONTINUED | OUTPATIENT
Start: 2018-08-19 | End: 2018-08-20

## 2018-08-19 RX ORDER — DIAZEPAM 5 MG/1
TABLET ORAL EVERY 8 HOURS PRN
Qty: 20 TABLET | Refills: 0 | Status: ON HOLD | OUTPATIENT
Start: 2018-08-19 | End: 2018-08-29

## 2018-08-19 RX ORDER — MORPHINE SULFATE 4 MG/ML
4 INJECTION, SOLUTION INTRAMUSCULAR; INTRAVENOUS ONCE
Status: COMPLETED | OUTPATIENT
Start: 2018-08-19 | End: 2018-08-19

## 2018-08-20 VITALS
RESPIRATION RATE: 17 BRPM | WEIGHT: 225 LBS | SYSTOLIC BLOOD PRESSURE: 149 MMHG | HEART RATE: 87 BPM | OXYGEN SATURATION: 94 % | TEMPERATURE: 98 F | HEIGHT: 69 IN | DIASTOLIC BLOOD PRESSURE: 78 MMHG | BODY MASS INDEX: 33.33 KG/M2

## 2018-08-20 PROBLEM — M23.321 DERANGEMENT OF POSTERIOR HORN OF MEDIAL MENISCUS OF RIGHT KNEE: Status: ACTIVE | Noted: 2018-08-20

## 2018-08-20 RX ORDER — MORPHINE SULFATE 4 MG/ML
4 INJECTION, SOLUTION INTRAMUSCULAR; INTRAVENOUS ONCE
Status: DISCONTINUED | OUTPATIENT
Start: 2018-08-20 | End: 2018-08-20

## 2018-08-20 NOTE — ED INITIAL ASSESSMENT (HPI)
Fall. Right knee gave out. Landed on right side. Complaining of right mid upper arm pain, right knee pain. Denies head, neck pain. Received 100 mcg fentanyl from EMS.

## 2018-08-20 NOTE — ED PROVIDER NOTES
Patient Seen in: [facility] Emergency Department    History   Patient presents with:  Fall (musculoskeletal, neurologic)    Stated Complaint: fall, right mid upper arm pain, right knee pain    HPI    Patient has a history of a torn meniscus in the r Neck: No JVD, no lymphadenopathy, no tenderness, swelling, deformity   Cardiovascular: Regular rate and rhythm, normal S1 and S2, no murmurs, rubs, or gallops   Lungs: Clear to auscultation bilaterally with equal breath sounds, no wheezing, no rhonchi needed (as needed for muscle spasm). , Print Script, Disp-20 tablet, R-0    HYDROcodone-acetaminophen  MG Oral Tab  Take 1-2 tablets by mouth every 4 (four) hours as needed for Pain., Print Script, Disp-20 tablet, R-0    !! - Potential duplicate medic

## 2018-08-27 ENCOUNTER — HOSPITAL ENCOUNTER (INPATIENT)
Facility: HOSPITAL | Age: 58
LOS: 1 days | Discharge: HOME | DRG: 149 | End: 2018-08-29
Admitting: INTERNAL MEDICINE
Payer: COMMERCIAL

## 2018-08-27 DIAGNOSIS — R42 DIZZINESS: Primary | ICD-10-CM

## 2018-08-27 DIAGNOSIS — M23.321 DERANGEMENT OF POSTERIOR HORN OF MEDIAL MENISCUS OF RIGHT KNEE: ICD-10-CM

## 2018-08-27 DIAGNOSIS — E87.6 HYPOKALEMIA: ICD-10-CM

## 2018-08-27 DIAGNOSIS — R29.6 MULTIPLE FALLS: ICD-10-CM

## 2018-08-27 DIAGNOSIS — S42.294A OTHER CLOSED NONDISPLACED FRACTURE OF PROXIMAL END OF RIGHT HUMERUS, INITIAL ENCOUNTER: ICD-10-CM

## 2018-08-27 DIAGNOSIS — S42.294D OTHER CLOSED NONDISPLACED FRACTURE OF PROXIMAL END OF RIGHT HUMERUS WITH ROUTINE HEALING, SUBSEQUENT ENCOUNTER: ICD-10-CM

## 2018-08-27 PROCEDURE — 85025 COMPLETE CBC W/AUTO DIFF WBC: CPT

## 2018-08-27 PROCEDURE — 93005 ELECTROCARDIOGRAM TRACING: CPT

## 2018-08-27 PROCEDURE — 85730 THROMBOPLASTIN TIME PARTIAL: CPT

## 2018-08-27 PROCEDURE — 84484 ASSAY OF TROPONIN QUANT: CPT

## 2018-08-27 PROCEDURE — 99285 EMERGENCY DEPT VISIT HI MDM: CPT

## 2018-08-27 PROCEDURE — 80053 COMPREHEN METABOLIC PANEL: CPT

## 2018-08-27 PROCEDURE — 96365 THER/PROPH/DIAG IV INF INIT: CPT

## 2018-08-27 PROCEDURE — 96375 TX/PRO/DX INJ NEW DRUG ADDON: CPT

## 2018-08-27 PROCEDURE — 85610 PROTHROMBIN TIME: CPT

## 2018-08-27 PROCEDURE — 93010 ELECTROCARDIOGRAM REPORT: CPT

## 2018-08-27 RX ORDER — MORPHINE SULFATE 4 MG/ML
2 INJECTION, SOLUTION INTRAMUSCULAR; INTRAVENOUS ONCE
Status: COMPLETED | OUTPATIENT
Start: 2018-08-27 | End: 2018-08-28

## 2018-08-28 ENCOUNTER — APPOINTMENT (OUTPATIENT)
Dept: CT IMAGING | Facility: HOSPITAL | Age: 58
DRG: 149 | End: 2018-08-28
Payer: COMMERCIAL

## 2018-08-28 ENCOUNTER — APPOINTMENT (OUTPATIENT)
Dept: GENERAL RADIOLOGY | Facility: HOSPITAL | Age: 58
DRG: 149 | End: 2018-08-28
Payer: COMMERCIAL

## 2018-08-28 PROBLEM — S42.294D OTHER CLOSED NONDISPLACED FRACTURE OF PROXIMAL END OF RIGHT HUMERUS WITH ROUTINE HEALING, SUBSEQUENT ENCOUNTER: Status: ACTIVE | Noted: 2018-08-28

## 2018-08-28 PROBLEM — E87.6 HYPOKALEMIA: Status: ACTIVE | Noted: 2018-08-28

## 2018-08-28 PROBLEM — R29.6 MULTIPLE FALLS: Status: ACTIVE | Noted: 2018-08-28

## 2018-08-28 LAB
ALBUMIN SERPL-MCNC: 3.9 G/DL (ref 3.5–4.8)
ALBUMIN/GLOB SERPL: 1.1 {RATIO} (ref 1–2)
ALP LIVER SERPL-CCNC: 79 U/L (ref 46–118)
ALT SERPL-CCNC: 34 U/L (ref 14–54)
ANION GAP SERPL CALC-SCNC: 8 MMOL/L (ref 0–18)
ANION GAP SERPL CALC-SCNC: 8 MMOL/L (ref 0–18)
APTT PPP: 28.2 SECONDS (ref 26.1–34.6)
AST SERPL-CCNC: 17 U/L (ref 15–41)
ATRIAL RATE: 102 BPM
BASOPHILS # BLD AUTO: 0.03 X10(3) UL (ref 0–0.1)
BASOPHILS # BLD AUTO: 0.04 X10(3) UL (ref 0–0.1)
BASOPHILS NFR BLD AUTO: 0.3 %
BASOPHILS NFR BLD AUTO: 0.4 %
BILIRUB SERPL-MCNC: 0.5 MG/DL (ref 0.1–2)
BILIRUB UR QL STRIP.AUTO: NEGATIVE
BUN BLD-MCNC: 13 MG/DL (ref 8–20)
BUN BLD-MCNC: 15 MG/DL (ref 8–20)
BUN/CREAT SERPL: 16.3 (ref 10–20)
BUN/CREAT SERPL: 20.5 (ref 10–20)
CALCIUM BLD-MCNC: 9.4 MG/DL (ref 8.3–10.3)
CALCIUM BLD-MCNC: 9.5 MG/DL (ref 8.3–10.3)
CHLORIDE SERPL-SCNC: 97 MMOL/L (ref 101–111)
CHLORIDE SERPL-SCNC: 99 MMOL/L (ref 101–111)
CO2 SERPL-SCNC: 32 MMOL/L (ref 22–32)
CO2 SERPL-SCNC: 33 MMOL/L (ref 22–32)
COLOR UR AUTO: YELLOW
CREAT BLD-MCNC: 0.73 MG/DL (ref 0.55–1.02)
CREAT BLD-MCNC: 0.8 MG/DL (ref 0.55–1.02)
EOSINOPHIL # BLD AUTO: 0.17 X10(3) UL (ref 0–0.3)
EOSINOPHIL # BLD AUTO: 0.19 X10(3) UL (ref 0–0.3)
EOSINOPHIL NFR BLD AUTO: 1.6 %
EOSINOPHIL NFR BLD AUTO: 2 %
ERYTHROCYTE [DISTWIDTH] IN BLOOD BY AUTOMATED COUNT: 13.2 % (ref 11.5–16)
ERYTHROCYTE [DISTWIDTH] IN BLOOD BY AUTOMATED COUNT: 13.3 % (ref 11.5–16)
GLOBULIN PLAS-MCNC: 3.5 G/DL (ref 2.5–4)
GLUCOSE BLD-MCNC: 107 MG/DL (ref 70–99)
GLUCOSE BLD-MCNC: 148 MG/DL (ref 70–99)
GLUCOSE UR STRIP.AUTO-MCNC: NEGATIVE MG/DL
HCT VFR BLD AUTO: 36.8 % (ref 34–50)
HCT VFR BLD AUTO: 38 % (ref 34–50)
HGB BLD-MCNC: 12.4 G/DL (ref 12–16)
HGB BLD-MCNC: 13 G/DL (ref 12–16)
IMMATURE GRANULOCYTE COUNT: 0.04 X10(3) UL (ref 0–1)
IMMATURE GRANULOCYTE COUNT: 0.05 X10(3) UL (ref 0–1)
IMMATURE GRANULOCYTE RATIO %: 0.4 %
IMMATURE GRANULOCYTE RATIO %: 0.5 %
INR BLD: 0.99 (ref 0.9–1.1)
KETONES UR STRIP.AUTO-MCNC: NEGATIVE MG/DL
LYMPHOCYTES # BLD AUTO: 2.59 X10(3) UL (ref 0.9–4)
LYMPHOCYTES # BLD AUTO: 2.74 X10(3) UL (ref 0.9–4)
LYMPHOCYTES NFR BLD AUTO: 25.8 %
LYMPHOCYTES NFR BLD AUTO: 27.8 %
M PROTEIN MFR SERPL ELPH: 7.4 G/DL (ref 6.1–8.3)
MCH RBC QN AUTO: 30.2 PG (ref 27–33.2)
MCH RBC QN AUTO: 30.2 PG (ref 27–33.2)
MCHC RBC AUTO-ENTMCNC: 33.7 G/DL (ref 31–37)
MCHC RBC AUTO-ENTMCNC: 34.2 G/DL (ref 31–37)
MCV RBC AUTO: 88.2 FL (ref 81–100)
MCV RBC AUTO: 89.5 FL (ref 81–100)
MONOCYTES # BLD AUTO: 0.57 X10(3) UL (ref 0.1–1)
MONOCYTES # BLD AUTO: 0.73 X10(3) UL (ref 0.1–1)
MONOCYTES NFR BLD AUTO: 5.4 %
MONOCYTES NFR BLD AUTO: 7.8 %
NEUTROPHIL ABS PRELIM: 5.75 X10 (3) UL (ref 1.3–6.7)
NEUTROPHIL ABS PRELIM: 7.04 X10 (3) UL (ref 1.3–6.7)
NEUTROPHILS # BLD AUTO: 5.75 X10(3) UL (ref 1.3–6.7)
NEUTROPHILS # BLD AUTO: 7.04 X10(3) UL (ref 1.3–6.7)
NEUTROPHILS NFR BLD AUTO: 61.7 %
NEUTROPHILS NFR BLD AUTO: 66.3 %
NITRITE UR QL STRIP.AUTO: NEGATIVE
OSMOLALITY SERPL CALC.SUM OF ELEC: 289 MOSM/KG (ref 275–295)
OSMOLALITY SERPL CALC.SUM OF ELEC: 289 MOSM/KG (ref 275–295)
P AXIS: 4 DEGREES
P-R INTERVAL: 136 MS
PH UR STRIP.AUTO: 6 [PH] (ref 4.5–8)
PLATELET # BLD AUTO: 334 10(3)UL (ref 150–450)
PLATELET # BLD AUTO: 370 10(3)UL (ref 150–450)
POTASSIUM SERPL-SCNC: 2.7 MMOL/L (ref 3.6–5.1)
POTASSIUM SERPL-SCNC: 3.1 MMOL/L (ref 3.6–5.1)
POTASSIUM SERPL-SCNC: 3.1 MMOL/L (ref 3.6–5.1)
POTASSIUM SERPL-SCNC: 3.2 MMOL/L (ref 3.6–5.1)
PROT UR STRIP.AUTO-MCNC: NEGATIVE MG/DL
PSA SERPL DL<=0.01 NG/ML-MCNC: 13.5 SECONDS (ref 12.4–14.7)
Q-T INTERVAL: 358 MS
QRS DURATION: 88 MS
QTC CALCULATION (BEZET): 466 MS
R AXIS: -32 DEGREES
RBC # BLD AUTO: 4.11 X10(6)UL (ref 3.8–5.1)
RBC # BLD AUTO: 4.31 X10(6)UL (ref 3.8–5.1)
RBC UR QL AUTO: NEGATIVE
RED CELL DISTRIBUTION WIDTH-SD: 42.5 FL (ref 35.1–46.3)
RED CELL DISTRIBUTION WIDTH-SD: 43.3 FL (ref 35.1–46.3)
SODIUM SERPL-SCNC: 138 MMOL/L (ref 136–144)
SODIUM SERPL-SCNC: 139 MMOL/L (ref 136–144)
SP GR UR STRIP.AUTO: 1.02 (ref 1–1.03)
T AXIS: 76 DEGREES
TROPONIN I SERPL-MCNC: <0.046 NG/ML (ref ?–0.05)
UROBILINOGEN UR STRIP.AUTO-MCNC: 2 MG/DL
VENTRICULAR RATE: 102 BPM
WBC # BLD AUTO: 10.6 X10(3) UL (ref 4–13)
WBC # BLD AUTO: 9.3 X10(3) UL (ref 4–13)

## 2018-08-28 PROCEDURE — 81001 URINALYSIS AUTO W/SCOPE: CPT | Performed by: HOSPITALIST

## 2018-08-28 PROCEDURE — 71045 X-RAY EXAM CHEST 1 VIEW: CPT

## 2018-08-28 PROCEDURE — 80048 BASIC METABOLIC PNL TOTAL CA: CPT | Performed by: HOSPITALIST

## 2018-08-28 PROCEDURE — 70450 CT HEAD/BRAIN W/O DYE: CPT

## 2018-08-28 PROCEDURE — 71275 CT ANGIOGRAPHY CHEST: CPT

## 2018-08-28 PROCEDURE — 85025 COMPLETE CBC W/AUTO DIFF WBC: CPT | Performed by: HOSPITALIST

## 2018-08-28 PROCEDURE — 97535 SELF CARE MNGMENT TRAINING: CPT

## 2018-08-28 PROCEDURE — 97165 OT EVAL LOW COMPLEX 30 MIN: CPT

## 2018-08-28 PROCEDURE — 84132 ASSAY OF SERUM POTASSIUM: CPT | Performed by: HOSPITALIST

## 2018-08-28 PROCEDURE — 97161 PT EVAL LOW COMPLEX 20 MIN: CPT

## 2018-08-28 PROCEDURE — 97116 GAIT TRAINING THERAPY: CPT

## 2018-08-28 PROCEDURE — 87086 URINE CULTURE/COLONY COUNT: CPT | Performed by: HOSPITALIST

## 2018-08-28 RX ORDER — DOCUSATE SODIUM 100 MG/1
100 CAPSULE, LIQUID FILLED ORAL 2 TIMES DAILY
Status: DISCONTINUED | OUTPATIENT
Start: 2018-08-28 | End: 2018-08-29

## 2018-08-28 RX ORDER — TRAZODONE HYDROCHLORIDE 50 MG/1
50 TABLET ORAL NIGHTLY
Status: DISCONTINUED | OUTPATIENT
Start: 2018-08-28 | End: 2018-08-29

## 2018-08-28 RX ORDER — HYDROXYZINE HYDROCHLORIDE 25 MG/1
25 TABLET, FILM COATED ORAL 3 TIMES DAILY PRN
Status: DISCONTINUED | OUTPATIENT
Start: 2018-08-28 | End: 2018-08-29

## 2018-08-28 RX ORDER — SENNOSIDES 8.6 MG
8.6 TABLET ORAL 2 TIMES DAILY PRN
Status: DISCONTINUED | OUTPATIENT
Start: 2018-08-28 | End: 2018-08-29

## 2018-08-28 RX ORDER — HYDROCODONE BITARTRATE AND ACETAMINOPHEN 10; 325 MG/1; MG/1
1 TABLET ORAL EVERY 4 HOURS PRN
Status: DISCONTINUED | OUTPATIENT
Start: 2018-08-28 | End: 2018-08-29

## 2018-08-28 RX ORDER — HEPARIN SODIUM 5000 [USP'U]/ML
5000 INJECTION, SOLUTION INTRAVENOUS; SUBCUTANEOUS EVERY 8 HOURS SCHEDULED
Status: DISCONTINUED | OUTPATIENT
Start: 2018-08-28 | End: 2018-08-29

## 2018-08-28 RX ORDER — POTASSIUM CHLORIDE 20 MEQ/1
40 TABLET, EXTENDED RELEASE ORAL EVERY 4 HOURS
Status: COMPLETED | OUTPATIENT
Start: 2018-08-28 | End: 2018-08-28

## 2018-08-28 RX ORDER — IBUPROFEN 400 MG/1
400 TABLET ORAL EVERY 6 HOURS PRN
Status: DISCONTINUED | OUTPATIENT
Start: 2018-08-28 | End: 2018-08-29

## 2018-08-28 RX ORDER — ATORVASTATIN CALCIUM 20 MG/1
20 TABLET, FILM COATED ORAL DAILY
Status: DISCONTINUED | OUTPATIENT
Start: 2018-08-28 | End: 2018-08-29

## 2018-08-28 RX ORDER — BENAZEPRIL/HYDROCHLOROTHIAZIDE 20 MG-25MG
1 TABLET ORAL DAILY
Status: DISCONTINUED | OUTPATIENT
Start: 2018-08-28 | End: 2018-08-28 | Stop reason: RX

## 2018-08-28 RX ORDER — DIAZEPAM 2 MG/1
2 TABLET ORAL EVERY 12 HOURS PRN
Status: DISCONTINUED | OUTPATIENT
Start: 2018-08-28 | End: 2018-08-29

## 2018-08-28 RX ORDER — DIAZEPAM 5 MG/1
5 TABLET ORAL EVERY 6 HOURS PRN
Status: DISCONTINUED | OUTPATIENT
Start: 2018-08-28 | End: 2018-08-28

## 2018-08-28 RX ORDER — SODIUM CHLORIDE 9 MG/ML
INJECTION, SOLUTION INTRAVENOUS ONCE
Status: COMPLETED | OUTPATIENT
Start: 2018-08-28 | End: 2018-08-28

## 2018-08-28 RX ORDER — PANTOPRAZOLE SODIUM 40 MG/1
40 TABLET, DELAYED RELEASE ORAL
Status: DISCONTINUED | OUTPATIENT
Start: 2018-08-28 | End: 2018-08-29

## 2018-08-28 NOTE — H&P
[facility] Hospitalist H&P       CC: Patient presents with:  Pain (neurologic)       PCP: Pete Gilman MD    History of Present Illness: Patient is a 59year old female with PMH sig for R humeral fracture p/w uncontrolled R shoulder pain and recurrent fa HYDROcodone-acetaminophen (NORCO)  MG Oral Tab Take 1 tablet by mouth every 4 (four) hours as needed for Pain. Disp: 20 tablet Rfl: 0    Diclofenac Sodium 75 MG Oral Tab EC Take 1 tablet (75 mg total) by mouth 2 (two) times daily.  Disp: 60 tablet R (Oral)   Resp 19   Wt 241 lb 6.5 oz (109.5 kg)   SpO2 96%   BMI 35.65 kg/m²   General:  Alert, no distress, appears stated age. Head:  Normocephalic, without obvious abnormality, atraumatic. Eyes:  Sclera anicteric, No conjunctival pallor, EOMs intact. transcribed by Technologist)  Right knee gave out and patient landed on right arm, anterior right knee pain, right arm pain. FINDINGS:   No evidence of fracture or dislocation  Moderate suprapatellar joint effusion.   Moderate loss of medial joint space no intraparenchymal brain abnormalities. There is nothing specific for acute infarct. There is no hemorrhage or mass lesion. SINUSES:           No sign of acute sinusitis. MASTOIDS:          No sign of acute inflammation.  SKULL:             No evidence CHEST WALL:  No mass or axillary adenopathy. LIMITED ABDOMEN:  Indeterminate 18 mm right adrenal nodule. Fatty liver. BONES:  Note is made of degenerative changes present in the spine. OTHER:   embolization coils in the middle lobe.        CONCLUSION:  N femoral condyle and medial tibial plateau which has progressed since prior exam extending to involve the articular surface of the medial femoral condyle . IMPRESSION: 1. Osteoarthritis of the medial compartment.  2. Medial meniscal tear with medial menis Fracture  · CXR 8/19: R humeral fracture, distally neurovascularly intact  · Immobilization recommended  · cnt f/u Ortho     # Uncontrolled pain  · Ran out of [facility] outpatient  · recc new refill of Norco w bowel regimen, limit to Q4-6H PRN    # Dizziness

## 2018-08-28 NOTE — PLAN OF CARE
Patient admitted from ED. Oriented to the unit and the room. Family at beside. Admission navigator completed. Orders put in per hospitalist. PRN norco given for pain to to right arm. Up with 1 person assist to the bathroom.  Bed alarm on. 3L nasal canula an

## 2018-08-28 NOTE — PROGRESS NOTES
08/28/18 1125 08/28/18 1127 08/28/18 1129   Vital Signs   Pulse 93 93 105   /78 132/74 128/81   BP Location Left arm Left arm Left arm   BP Method Automatic Automatic Automatic   Patient Position Lying Sitting Standing

## 2018-08-28 NOTE — PHYSICAL THERAPY NOTE
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 ..   -   Moving to and from a bed to a chair (including a wheelchair)?: A Little   -   Need to walk in hospital room?: [address] subsequent R proximal humeral fx being treated conservatively, recent diagnosis R medial meniscus tear s/p steroid injection.   In this PT evaluation, the patient presents with the following impairments decreased AROM/srtength R knee, edema/pain R knee, epi

## 2018-08-28 NOTE — PLAN OF CARE
Assumed patient care at 0730.  Vital signs stable   Pain to right arm relieved with Norco. Sling to arm   Orthostatic BP done, slightly dizzy upon standing   250 cc bolus done   K replaced   Plan of care discussed, will continue to monitor       1800- Patie

## 2018-08-28 NOTE — ED PROVIDER NOTES
Patient Seen in: [facility] Emergency Department    History   Patient presents with:  Pain (neurologic)    Stated Complaint: PAIN     HPI    25-year-old obese female presents to ER with right shoulder pain. She fractured it a few weeks ago.   She wa [08/28/18 0003]    Current:/89   Pulse 98   Resp 16   SpO2 97%         Physical Exam    GENERAL APPEARANCE:     Well developed, well nourished, alert       Head: Normocephalic and atraumatic.      Sclera anicteric, conjunctiva pink and moist.   PERRL, orders were created for panel order CBC WITH DIFFERENTIAL WITH PLATELET.   Procedure                               Abnormality         Status                     ---------                               -----------         ------                     CBC W/ D trazodone. Potassium was given IV. MDM     Patient with recent fracture, no dizziness, continued pain, discharge home is not reasonable at this time given her living alone, having already taken her Valium and trazodone this evening.     Admission

## 2018-08-28 NOTE — OCCUPATIONAL THERAPY NOTE
OCCUPATIONAL THERAPY EVALUATION - INPATIENT     Room Number: 5892/6018-A  Evaluation Date: 8/28/2018  Type of Evaluation: Initial  Presenting Problem: pain R shoulder, fall, recent R proximal humerus fracture on 8/20    Physician Order: IP Consult to [employer] BACK SURGERY      Comment: laminectomy L4-5-S1  No date: [facility]  2004: HYSTERECTOMY      Comment: partial  COIL IN RIGHT SIDE OF CHEST: OTHER  No date: REMOVAL GALLBLADDER  No date: TOTAL ABDOM HYSTERECTOMY    OCCUPATIONAL PROFILE    H except for the following;  R UE NWB    COORDINATION  Gross Motor   R UE in sling   Fine Motor   R hand in sling, intact for fine motor     ADDITIONAL TESTS                                    NEUROLOGICAL FINDINGS                     ACTIVITIES OF DAILY JARED MD note on 8/24, conservative treatment with shoulder sling for 4 weeks. Pt fell twice more when her R knee gave out. History of R knee pain and meniscal tear since June. Steroid injection in July. Pt reporting feeling dizziness and restless.   Brain CT Patient will perform upper body dressing:  with min assist and while in chair  Patient will perform toileting: with supervision    Functional Transfer Goals  Patient will transfer to toilet:  with modified independent

## 2018-08-29 VITALS
HEART RATE: 89 BPM | WEIGHT: 241.38 LBS | OXYGEN SATURATION: 96 % | BODY MASS INDEX: 36 KG/M2 | TEMPERATURE: 98 F | SYSTOLIC BLOOD PRESSURE: 132 MMHG | DIASTOLIC BLOOD PRESSURE: 65 MMHG | RESPIRATION RATE: 18 BRPM

## 2018-08-29 LAB
ANION GAP SERPL CALC-SCNC: 8 MMOL/L (ref 0–18)
BUN BLD-MCNC: 15 MG/DL (ref 8–20)
BUN/CREAT SERPL: 22.4 (ref 10–20)
CALCIUM BLD-MCNC: 9.2 MG/DL (ref 8.3–10.3)
CHLORIDE SERPL-SCNC: 102 MMOL/L (ref 101–111)
CO2 SERPL-SCNC: 31 MMOL/L (ref 22–32)
CREAT BLD-MCNC: 0.67 MG/DL (ref 0.55–1.02)
GLUCOSE BLD-MCNC: 106 MG/DL (ref 70–99)
OSMOLALITY SERPL CALC.SUM OF ELEC: 293 MOSM/KG (ref 275–295)
POTASSIUM SERPL-SCNC: 3.6 MMOL/L (ref 3.6–5.1)
SODIUM SERPL-SCNC: 141 MMOL/L (ref 136–144)

## 2018-08-29 PROCEDURE — 97530 THERAPEUTIC ACTIVITIES: CPT

## 2018-08-29 PROCEDURE — 97116 GAIT TRAINING THERAPY: CPT

## 2018-08-29 PROCEDURE — 80048 BASIC METABOLIC PNL TOTAL CA: CPT | Performed by: HOSPITALIST

## 2018-08-29 PROCEDURE — 97110 THERAPEUTIC EXERCISES: CPT

## 2018-08-29 RX ORDER — NAPROXEN 500 MG/1
500 TABLET ORAL 2 TIMES DAILY WITH MEALS
Status: DISCONTINUED | OUTPATIENT
Start: 2018-08-29 | End: 2018-08-29

## 2018-08-29 RX ORDER — HYDROXYZINE HYDROCHLORIDE 25 MG/1
25 TABLET, FILM COATED ORAL 3 TIMES DAILY PRN
Qty: 10 TABLET | Refills: 0 | Status: SHIPPED | OUTPATIENT
Start: 2018-08-29 | End: 2019-02-20

## 2018-08-29 RX ORDER — POTASSIUM CHLORIDE 20 MEQ/1
40 TABLET, EXTENDED RELEASE ORAL EVERY 4 HOURS
Status: COMPLETED | OUTPATIENT
Start: 2018-08-29 | End: 2018-08-29

## 2018-08-29 RX ORDER — HYDROCODONE BITARTRATE AND ACETAMINOPHEN 10; 325 MG/1; MG/1
TABLET ORAL
Qty: 20 TABLET | Refills: 0 | Status: SHIPPED | OUTPATIENT
Start: 2018-08-29 | End: 2019-02-20

## 2018-08-29 RX ORDER — NAPROXEN 500 MG/1
500 TABLET ORAL 2 TIMES DAILY WITH MEALS
Qty: 20 TABLET | Refills: 0 | Status: SHIPPED | OUTPATIENT
Start: 2018-08-29 | End: 2019-02-20

## 2018-08-29 RX ORDER — PSEUDOEPHEDRINE HCL 30 MG
TABLET ORAL
Qty: 30 CAPSULE | Refills: 0 | Status: SHIPPED | OUTPATIENT
Start: 2018-08-29 | End: 2019-02-20

## 2018-08-29 NOTE — PHYSICAL THERAPY NOTE
PHYSICAL THERAPY TREATMENT NOTE - INPATIENT    Room Number: 5263/5781-G     Session: 1   Number of Visits to Meet Established Goals: 3    Presenting Problem: falls, dizziness     History related to current admission:      Pt is 59year old female admitted (Comment) (recent R humeral non-displaced fx in sling)    WEIGHT BEARING RESTRICTION  Weight Bearing Restriction: R upper extremity  R Upper Extremity: Non-Weight Bearing             PAIN ASSESSMENT   Ratin  Location: RUE  Management Techniques:  Ilana Munoz left descending. Pt able to demonstrate safely. Reviewed HEP for R knee and issued handout. Updated pt and daughter on role of PT, POC, DC planning/recs, positioning.,activity.        THERAPEUTIC EXERCISES  Lower Extremity Ankle pumps  Heel slides  Ale Chandler supine - sit EOB @ level: modified independent   ongoing 8/29/18   Goal #2 Patient is able to demonstrate transfers Sit to/from Stand at assistance level: modified independent   ongoing 8/29/18   Goal #3 Patient is able to ambulate 150 feet with assist dev

## 2018-08-29 NOTE — PLAN OF CARE
Assumed patient care at 0730.  Vital signs stable  Pain relieved with Norco and motrin   Right arm sling in place   Ambulated la with OT   Discharge planning, plan of care discussed with patient and daughter     Impaired Activities of Daily Living    • Ac

## 2018-08-29 NOTE — PLAN OF CARE
Patient alert and oriented times four. Meds given per MAR. Pain controlled with PRN norco. 1 dose of anxiety medication in am. Vital signs stable. Up with 1 person standby to the bathroom. Right arm in sling. Resting comfortably in bed.  Call light in reach

## 2018-08-29 NOTE — PROGRESS NOTES
Okay for discharge.  Follow up and medication information reviewed with patient and daughter   IV removed, tele removed   Waiting for wheelchair for transport

## 2018-09-04 NOTE — PAYOR COMM NOTE
--------------  DISCHARGE REVIEW    Payor: [facility]  Subscriber #:  [id number]  Authorization Number: [id number]    Admit date: 8/28/18  Admit time:  0844  Discharge Date: 8/29/2018  1:54 PM     Admitting Physician: Tigre Valladares MD

## 2018-09-07 NOTE — DISCHARGE SUMMARY
General Medicine Discharge Summary     Patient ID:  Tao Morataya  59year old  5/8/1960    Admit date: 8/27/2018    Discharge date and time:  8/29/18    Attending Physician: No att. providers found with change in regimen for pain limited to just Norco prn     # HypoKalemia  -Norco constipated pt x1 week, took too much stool softners and have diarrhea  - recc daily bowel regimen  - repleted K        # HTN/HL  -cnt home meds     # Depression/ Anxiety EC    Cyclobenzaprine HCl 10 MG Oral Tab    diazepam 5 MG Oral Tab          Activity: activity as tolerated  Diet: regular diet  Wound Care: as directed  Code Status: Full Code      Exam on day of discharge:      08/29/18  1231   BP: 132/65   Pulse: 89   R

## 2018-09-27 PROBLEM — M25.511 ACUTE PAIN OF RIGHT SHOULDER: Status: ACTIVE | Noted: 2018-09-27

## 2019-04-01 PROBLEM — H61.21 CERUMINOSIS, RIGHT: Status: ACTIVE | Noted: 2019-04-01

## 2019-04-01 PROBLEM — H92.01 RIGHT EAR PAIN: Status: ACTIVE | Noted: 2019-04-01

## 2019-06-11 ENCOUNTER — HOSPITAL ENCOUNTER (EMERGENCY)
Facility: HOSPITAL | Age: 59
Discharge: HOME | End: 2019-06-11
Payer: COMMERCIAL

## 2019-06-11 ENCOUNTER — APPOINTMENT (OUTPATIENT)
Dept: GENERAL RADIOLOGY | Facility: HOSPITAL | Age: 59
End: 2019-06-11
Payer: COMMERCIAL

## 2019-06-11 VITALS
BODY MASS INDEX: 33.33 KG/M2 | TEMPERATURE: 98 F | HEART RATE: 80 BPM | RESPIRATION RATE: 18 BRPM | DIASTOLIC BLOOD PRESSURE: 74 MMHG | OXYGEN SATURATION: 97 % | WEIGHT: 225 LBS | SYSTOLIC BLOOD PRESSURE: 110 MMHG | HEIGHT: 69 IN

## 2019-06-11 DIAGNOSIS — M25.561 ACUTE PAIN OF RIGHT KNEE: Primary | ICD-10-CM

## 2019-06-11 PROCEDURE — 99284 EMERGENCY DEPT VISIT MOD MDM: CPT

## 2019-06-11 PROCEDURE — 73562 X-RAY EXAM OF KNEE 3: CPT

## 2019-06-11 PROCEDURE — 99283 EMERGENCY DEPT VISIT LOW MDM: CPT

## 2019-06-11 RX ORDER — METHYLPREDNISOLONE 4 MG/1
TABLET ORAL
Qty: 1 PACKAGE | Refills: 0 | Status: SHIPPED | OUTPATIENT
Start: 2019-06-11 | End: 2019-06-16

## 2019-06-11 RX ORDER — HYDROCODONE BITARTRATE AND ACETAMINOPHEN 5; 325 MG/1; MG/1
1 TABLET ORAL ONCE
Status: COMPLETED | OUTPATIENT
Start: 2019-06-11 | End: 2019-06-11

## 2019-06-11 RX ORDER — HYDROCODONE BITARTRATE AND ACETAMINOPHEN 5; 325 MG/1; MG/1
1-2 TABLET ORAL EVERY 6 HOURS PRN
Qty: 10 TABLET | Refills: 0 | Status: SHIPPED | OUTPATIENT
Start: 2019-06-11 | End: 2019-06-18

## 2019-06-11 NOTE — ED INITIAL ASSESSMENT (HPI)
Pt states her right knee locked up and then she fell. Pt notes swelling to knee today. Consent (Lip)/Introductory Paragraph: The rationale for Mohs was explained to the patient and consent was obtained. The risks, benefits and alternatives to therapy were discussed in detail. Specifically, the risks of lip deformity, changes in the oral aperture, infection, scarring, bleeding, prolonged wound healing, incomplete removal, allergy to anesthesia, nerve injury and recurrence were addressed. Prior to the procedure, the treatment site was clearly identified and confirmed by the patient. All components of Universal Protocol/PAUSE Rule completed.

## 2019-06-12 NOTE — ED PROVIDER NOTES
Patient Seen in: [facility] Emergency Department    History   Patient presents with:  Lower Extremity Injury (musculoskeletal)    Stated Complaint: knee pain and swelling x one week    HPI    17-year-old female complaint right knee swelling patient slight decreased range of motion there is no redness about the joint there is mild generalized tenderness the anterior drawer test Lachman test are normal medial lateral collateral ligaments are intact there is no calf swelling or tenderness pulses are int taking these medications    methylPREDNISolone 4 MG Oral Tablet Therapy Pack  Dosepack: use as directed on packaging  Qty: 1 Package Refills: 0    HYDROcodone-acetaminophen 5-325 MG Oral Tab  Take 1-2 tablets by mouth every 6 (six) hours as needed for Pain

## 2019-06-19 ENCOUNTER — APPOINTMENT (OUTPATIENT)
Dept: ULTRASOUND IMAGING | Facility: HOSPITAL | Age: 59
End: 2019-06-19
Payer: COMMERCIAL

## 2019-06-19 ENCOUNTER — HOSPITAL ENCOUNTER (EMERGENCY)
Facility: HOSPITAL | Age: 59
Discharge: HOME | End: 2019-06-19
Payer: COMMERCIAL

## 2019-06-19 VITALS
BODY MASS INDEX: 33.33 KG/M2 | OXYGEN SATURATION: 99 % | DIASTOLIC BLOOD PRESSURE: 75 MMHG | HEART RATE: 82 BPM | RESPIRATION RATE: 18 BRPM | WEIGHT: 225 LBS | SYSTOLIC BLOOD PRESSURE: 119 MMHG | HEIGHT: 69 IN | TEMPERATURE: 98 F

## 2019-06-19 DIAGNOSIS — M71.21 BAKER'S CYST OF KNEE, RIGHT: Primary | ICD-10-CM

## 2019-06-19 PROCEDURE — 99284 EMERGENCY DEPT VISIT MOD MDM: CPT

## 2019-06-19 PROCEDURE — 93971 EXTREMITY STUDY: CPT

## 2019-06-19 NOTE — ED PROVIDER NOTES
Patient Seen in: [facility] Emergency Department    History   Patient presents with:  Deep Vein Thrombosis (cardiovascular)    Stated Complaint: right calf pain-r/o DVT    HPI    Delvis Lange is a 17-year-old female who comes in today complaining of worsen Current:/75   Pulse 91   Temp 97.9 °F (36.6 °C) (Temporal)   Resp 16   Ht 175.3 cm (5' 9\")   Wt 102.1 kg   SpO2 97%   BMI 33.23 kg/m²         Physical Exam   Constitutional: She is oriented to person, place, and time.  She appears well-develope anterior knee pain. FINDINGS:  Overall small suprapatellar joint effusion is present. This is similar to the prior exam.  No evidence of acute fracture or dislocation. Normal bone mineral density.   Small osteophyte formation noted over the medial fem anti-inflammatory medications for pain; when she is working she should wear a neoprene knee brace. Ice to the area keep the extremity elevated.         The patient is in good condition throughout her visit today and remains so upon discharge.  I discuss th

## 2019-06-19 NOTE — ED NOTES
Patient has returned from [address] [facility] and is room for first time since being triaged.

## 2019-06-25 PROBLEM — M25.461 EFFUSION OF RIGHT KNEE: Status: ACTIVE | Noted: 2018-04-18

## 2019-12-20 PROBLEM — S83.232D COMPLEX TEAR OF MEDIAL MENISCUS OF LEFT KNEE AS CURRENT INJURY, SUBSEQUENT ENCOUNTER: Status: ACTIVE | Noted: 2019-12-20

## 2020-01-05 ENCOUNTER — OFFICE VISIT (OUTPATIENT)
Dept: FAMILY MEDICINE CLINIC | Facility: CLINIC | Age: 60
End: 2020-01-05
Payer: COMMERCIAL

## 2020-01-05 VITALS
WEIGHT: 245 LBS | BODY MASS INDEX: 36.29 KG/M2 | RESPIRATION RATE: 16 BRPM | DIASTOLIC BLOOD PRESSURE: 78 MMHG | HEIGHT: 69 IN | TEMPERATURE: 99 F | HEART RATE: 89 BPM | OXYGEN SATURATION: 98 % | SYSTOLIC BLOOD PRESSURE: 122 MMHG

## 2020-01-05 DIAGNOSIS — J06.9 VIRAL URI WITH COUGH: Primary | ICD-10-CM

## 2020-01-05 PROCEDURE — 99213 OFFICE O/P EST LOW 20 MIN: CPT | Performed by: NURSE PRACTITIONER

## 2020-01-05 RX ORDER — BENZONATATE 200 MG/1
200 CAPSULE ORAL 3 TIMES DAILY PRN
Qty: 30 CAPSULE | Refills: 0 | Status: SHIPPED | OUTPATIENT
Start: 2020-01-05 | End: 2020-03-25 | Stop reason: ALTCHOICE

## 2020-01-05 NOTE — PROGRESS NOTES
Patient presents with:  Cough: cough x 4 days, non-productive, worse at night. Denies fevers. +Influenza vaccine. HPI:   Tc Lucas is a 62year old female who presents for upper respiratory symptoms for  4  days.  Patient reports sore throat on [address]     • HYSTERECTOMY  2004    partial   • OTHER  COIL IN RIGHT SIDE OF CHEST   • REMOVAL GALLBLADDER     • TOTAL ABDOM HYSTERECTOMY        Family History   Problem Relation Age of Onset   • Cancer Father 78        Lung cancer   • Cancer Son 1 capsule (200 mg total) by mouth 3 (three) times daily as needed. Imaging & Consults:  None     Increase fluids, Tylenol prn, rest.  The patient indicates understanding of these issues and agrees to the plan.   The patient is asked to return if sx's

## 2020-01-16 PROBLEM — Z47.89 ORTHOPEDIC AFTERCARE: Status: ACTIVE | Noted: 2020-01-16

## 2020-02-10 ENCOUNTER — APPOINTMENT (OUTPATIENT)
Dept: GENERAL RADIOLOGY | Facility: HOSPITAL | Age: 60
End: 2020-02-10
Payer: COMMERCIAL

## 2020-02-10 ENCOUNTER — HOSPITAL ENCOUNTER (EMERGENCY)
Facility: HOSPITAL | Age: 60
Discharge: HOME | End: 2020-02-10
Payer: COMMERCIAL

## 2020-02-10 VITALS
OXYGEN SATURATION: 95 % | SYSTOLIC BLOOD PRESSURE: 134 MMHG | DIASTOLIC BLOOD PRESSURE: 84 MMHG | TEMPERATURE: 98 F | RESPIRATION RATE: 18 BRPM | WEIGHT: 245 LBS | HEIGHT: 69 IN | BODY MASS INDEX: 36.29 KG/M2 | HEART RATE: 78 BPM

## 2020-02-10 DIAGNOSIS — S42.391A OTHER FRACTURE OF SHAFT OF RIGHT HUMERUS, INITIAL ENCOUNTER FOR CLOSED FRACTURE: Primary | ICD-10-CM

## 2020-02-10 PROCEDURE — 73030 X-RAY EXAM OF SHOULDER: CPT

## 2020-02-10 PROCEDURE — 99283 EMERGENCY DEPT VISIT LOW MDM: CPT

## 2020-02-10 RX ORDER — HYDROCODONE BITARTRATE AND ACETAMINOPHEN 5; 325 MG/1; MG/1
1-2 TABLET ORAL EVERY 6 HOURS PRN
Qty: 10 TABLET | Refills: 0 | Status: SHIPPED | OUTPATIENT
Start: 2020-02-10 | End: 2020-02-17

## 2020-02-10 RX ORDER — HYDROCODONE BITARTRATE AND ACETAMINOPHEN 5; 325 MG/1; MG/1
1 TABLET ORAL ONCE
Status: COMPLETED | OUTPATIENT
Start: 2020-02-10 | End: 2020-02-10

## 2020-02-10 NOTE — ED INITIAL ASSESSMENT (HPI)
Patient injured right should two days ago. She ran into a door and the arm went backwards in wrong angle. Pain worsening.

## 2020-02-11 NOTE — ED PROVIDER NOTES
Patient Seen in: [facility] Emergency Department      History   Patient presents with:  Upper Extremity Injury    Stated Complaint: pain to right shoulder, possible reinjury, hx of fx.     HPI    Dedra Troy is a pleasant 24-year-old female coming complaint extremities normal except for right upper extremities and pain in the proximal right humerus neurovascular tach distal area of injury no clavicle tenderness to palpation    ED Course   Labs Reviewed - No data to display               MDM     I have reviewe

## 2020-02-14 PROBLEM — M19.011 OSTEOARTHRITIS OF GLENOHUMERAL JOINT, RIGHT: Status: ACTIVE | Noted: 2020-02-14

## 2020-02-14 PROBLEM — Z98.890 STATUS POST ARTHROSCOPIC SURGERY OF LEFT KNEE: Status: ACTIVE | Noted: 2020-02-14

## 2020-02-21 PROBLEM — R20.2 NUMBNESS AND TINGLING IN RIGHT HAND: Status: ACTIVE | Noted: 2020-02-21

## 2020-02-21 PROBLEM — S49.91XA INJURY OF RIGHT SHOULDER, INITIAL ENCOUNTER: Status: ACTIVE | Noted: 2020-02-21

## 2020-02-21 PROBLEM — R20.0 NUMBNESS AND TINGLING IN RIGHT HAND: Status: ACTIVE | Noted: 2020-02-21

## 2020-03-25 RX ORDER — SODIUM CHLORIDE, SODIUM LACTATE, POTASSIUM CHLORIDE, CALCIUM CHLORIDE 600; 310; 30; 20 MG/100ML; MG/100ML; MG/100ML; MG/100ML
INJECTION, SOLUTION INTRAVENOUS CONTINUOUS
Status: CANCELLED | OUTPATIENT
Start: 2020-03-25

## 2020-03-25 RX ORDER — ACETAMINOPHEN 500 MG
1000 TABLET ORAL ONCE
Status: CANCELLED | OUTPATIENT
Start: 2020-03-25 | End: 2020-03-25

## 2020-03-25 RX ORDER — CEFAZOLIN SODIUM/WATER 2 G/20 ML
2 SYRINGE (ML) INTRAVENOUS ONCE
Status: CANCELLED | OUTPATIENT
Start: 2020-03-25 | End: 2020-03-25

## 2020-03-25 RX ORDER — SCOLOPAMINE TRANSDERMAL SYSTEM 1 MG/1
1 PATCH, EXTENDED RELEASE TRANSDERMAL ONCE
Status: CANCELLED | OUTPATIENT
Start: 2020-03-25 | End: 2020-03-25

## 2020-04-14 RX ORDER — ACETAMINOPHEN 500 MG
1000 TABLET ORAL ONCE
Status: CANCELLED | OUTPATIENT
Start: 2020-04-14 | End: 2020-04-14

## 2020-05-18 ENCOUNTER — LABORATORY ENCOUNTER (OUTPATIENT)
Dept: LAB | Facility: HOSPITAL | Age: 60
End: 2020-05-18
Attending: ORTHOPAEDIC SURGERY
Payer: MEDICAID

## 2020-05-18 DIAGNOSIS — Z01.818 PRE-OP TESTING: ICD-10-CM

## 2020-05-18 PROCEDURE — 93010 ELECTROCARDIOGRAM REPORT: CPT | Performed by: INTERNAL MEDICINE

## 2020-05-18 PROCEDURE — 80053 COMPREHEN METABOLIC PANEL: CPT

## 2020-05-18 PROCEDURE — 87086 URINE CULTURE/COLONY COUNT: CPT

## 2020-05-18 PROCEDURE — 85025 COMPLETE CBC W/AUTO DIFF WBC: CPT

## 2020-05-18 PROCEDURE — 87081 CULTURE SCREEN ONLY: CPT

## 2020-05-18 PROCEDURE — 81001 URINALYSIS AUTO W/SCOPE: CPT

## 2020-05-18 PROCEDURE — 36415 COLL VENOUS BLD VENIPUNCTURE: CPT

## 2020-05-18 PROCEDURE — 93005 ELECTROCARDIOGRAM TRACING: CPT

## 2020-05-22 ENCOUNTER — ANESTHESIA EVENT (OUTPATIENT)
Dept: SURGERY | Facility: HOSPITAL | Age: 60
DRG: 483 | End: 2020-05-22
Payer: MEDICAID

## 2020-05-26 ENCOUNTER — LAB ENCOUNTER (OUTPATIENT)
Dept: LAB | Facility: HOSPITAL | Age: 60
End: 2020-05-26
Attending: ORTHOPAEDIC SURGERY
Payer: MEDICAID

## 2020-05-26 DIAGNOSIS — Z01.818 PRE-OP TESTING: ICD-10-CM

## 2020-05-26 NOTE — CM/SW NOTE
Patient was screened, no dc needs are identified at this time. RN to contact SW/CM if needs arise.     Obinna Gamez RN [facility] [employer]  [phone]

## 2020-05-28 ENCOUNTER — APPOINTMENT (OUTPATIENT)
Dept: GENERAL RADIOLOGY | Facility: HOSPITAL | Age: 60
DRG: 483 | End: 2020-05-28
Attending: ORTHOPAEDIC SURGERY
Payer: MEDICAID

## 2020-05-28 ENCOUNTER — HOSPITAL ENCOUNTER (INPATIENT)
Facility: HOSPITAL | Age: 60
LOS: 1 days | Discharge: HOME | DRG: 483 | End: 2020-05-29
Attending: ORTHOPAEDIC SURGERY | Admitting: ORTHOPAEDIC SURGERY
Payer: MEDICAID

## 2020-05-28 ENCOUNTER — ANESTHESIA (OUTPATIENT)
Dept: SURGERY | Facility: HOSPITAL | Age: 60
DRG: 483 | End: 2020-05-28
Payer: MEDICAID

## 2020-05-28 ENCOUNTER — APPOINTMENT (OUTPATIENT)
Dept: GENERAL RADIOLOGY | Facility: HOSPITAL | Age: 60
DRG: 483 | End: 2020-05-28
Attending: ANESTHESIOLOGY
Payer: MEDICAID

## 2020-05-28 DIAGNOSIS — M87.021 AVASCULAR NECROSIS OF RIGHT HUMERAL HEAD (HCC): ICD-10-CM

## 2020-05-28 DIAGNOSIS — S42.201P CLOSED FRACTURE OF PROXIMAL END OF RIGHT HUMERUS WITH MALUNION, UNSPECIFIED FRACTURE MORPHOLOGY, SUBSEQUENT ENCOUNTER: ICD-10-CM

## 2020-05-28 DIAGNOSIS — Z01.818 PRE-OP TESTING: Primary | ICD-10-CM

## 2020-05-28 PROCEDURE — 85025 COMPLETE CBC W/AUTO DIFF WBC: CPT | Performed by: INTERNAL MEDICINE

## 2020-05-28 PROCEDURE — 82375 ASSAY CARBOXYHB QUANT: CPT | Performed by: ANESTHESIOLOGY

## 2020-05-28 PROCEDURE — 82330 ASSAY OF CALCIUM: CPT

## 2020-05-28 PROCEDURE — 84145 PROCALCITONIN (PCT): CPT | Performed by: INTERNAL MEDICINE

## 2020-05-28 PROCEDURE — 83050 HGB METHEMOGLOBIN QUAN: CPT | Performed by: ANESTHESIOLOGY

## 2020-05-28 PROCEDURE — 80053 COMPREHEN METABOLIC PANEL: CPT | Performed by: INTERNAL MEDICINE

## 2020-05-28 PROCEDURE — 84132 ASSAY OF SERUM POTASSIUM: CPT

## 2020-05-28 PROCEDURE — 88305 TISSUE EXAM BY PATHOLOGIST: CPT | Performed by: ORTHOPAEDIC SURGERY

## 2020-05-28 PROCEDURE — 3E0T3BZ INTRODUCTION OF ANESTHETIC AGENT INTO PERIPHERAL NERVES AND PLEXI, PERCUTANEOUS APPROACH: ICD-10-PCS | Performed by: ANESTHESIOLOGY

## 2020-05-28 PROCEDURE — 71045 X-RAY EXAM CHEST 1 VIEW: CPT | Performed by: ANESTHESIOLOGY

## 2020-05-28 PROCEDURE — 73020 X-RAY EXAM OF SHOULDER: CPT | Performed by: ORTHOPAEDIC SURGERY

## 2020-05-28 PROCEDURE — 82803 BLOOD GASES ANY COMBINATION: CPT

## 2020-05-28 PROCEDURE — 0RRJ00Z REPLACEMENT OF RIGHT SHOULDER JOINT WITH REVERSE BALL AND SOCKET SYNTHETIC SUBSTITUTE, OPEN APPROACH: ICD-10-PCS | Performed by: ORTHOPAEDIC SURGERY

## 2020-05-28 PROCEDURE — 85014 HEMATOCRIT: CPT

## 2020-05-28 PROCEDURE — 82803 BLOOD GASES ANY COMBINATION: CPT | Performed by: ANESTHESIOLOGY

## 2020-05-28 PROCEDURE — 88311 DECALCIFY TISSUE: CPT | Performed by: ORTHOPAEDIC SURGERY

## 2020-05-28 PROCEDURE — 85018 HEMOGLOBIN: CPT | Performed by: ANESTHESIOLOGY

## 2020-05-28 PROCEDURE — 84295 ASSAY OF SERUM SODIUM: CPT

## 2020-05-28 DEVICE — DELTA XTEND STANDARD GLENOSPHERE DIA 38MM STD
Type: IMPLANTABLE DEVICE | Site: SHOULDER | Status: FUNCTIONAL
Brand: DELTA XTEND

## 2020-05-28 DEVICE — DELTA XTEND LOCKING METAGLENE SCREW DIA 4.5 LG 30MM
Type: IMPLANTABLE DEVICE | Site: SHOULDER | Status: FUNCTIONAL
Brand: DELTA XTEND

## 2020-05-28 DEVICE — GLOBAL UNITE POROCOAT STANDARD STEM SIZE 10 113MM
Type: IMPLANTABLE DEVICE | Site: SHOULDER | Status: FUNCTIONAL
Brand: GLOBAL UNITE

## 2020-05-28 DEVICE — DELTA XTEND NON LOCKING METAGLENE SCREW DIA 4.5 LG 18MM
Type: IMPLANTABLE DEVICE | Site: SHOULDER | Status: FUNCTIONAL
Brand: DELTA XTEND

## 2020-05-28 DEVICE — DELTA XTEND PREMIERON X-LINKED PE HUMERAL CUP HIGH MOBILITY SIZE 38 +3MM
Type: IMPLANTABLE DEVICE | Site: SHOULDER | Status: FUNCTIONAL
Brand: DELTA XTEND

## 2020-05-28 DEVICE — DELTA XTEND CEMENTLESS METAGLENE HA
Type: IMPLANTABLE DEVICE | Site: SHOULDER | Status: FUNCTIONAL
Brand: DELTA XTEND

## 2020-05-28 RX ORDER — SODIUM CHLORIDE 9 MG/ML
INJECTION, SOLUTION INTRAVENOUS ONCE
Status: COMPLETED | OUTPATIENT
Start: 2020-05-28 | End: 2020-05-28

## 2020-05-28 RX ORDER — SODIUM PHOSPHATE, DIBASIC AND SODIUM PHOSPHATE, MONOBASIC 7; 19 G/133ML; G/133ML
1 ENEMA RECTAL ONCE AS NEEDED
Status: DISCONTINUED | OUTPATIENT
Start: 2020-05-28 | End: 2020-05-29

## 2020-05-28 RX ORDER — SODIUM CHLORIDE, SODIUM LACTATE, POTASSIUM CHLORIDE, CALCIUM CHLORIDE 600; 310; 30; 20 MG/100ML; MG/100ML; MG/100ML; MG/100ML
INJECTION, SOLUTION INTRAVENOUS CONTINUOUS
Status: DISCONTINUED | OUTPATIENT
Start: 2020-05-28 | End: 2020-05-29

## 2020-05-28 RX ORDER — ESMOLOL HYDROCHLORIDE 10 MG/ML
INJECTION INTRAVENOUS AS NEEDED
Status: DISCONTINUED | OUTPATIENT
Start: 2020-05-28 | End: 2020-05-28 | Stop reason: SURG

## 2020-05-28 RX ORDER — ALBUTEROL SULFATE 90 UG/1
AEROSOL, METERED RESPIRATORY (INHALATION) AS NEEDED
Status: DISCONTINUED | OUTPATIENT
Start: 2020-05-28 | End: 2020-05-28 | Stop reason: SURG

## 2020-05-28 RX ORDER — DOCUSATE SODIUM 100 MG/1
100 CAPSULE, LIQUID FILLED ORAL 2 TIMES DAILY
Status: DISCONTINUED | OUTPATIENT
Start: 2020-05-28 | End: 2020-05-29

## 2020-05-28 RX ORDER — ACETAMINOPHEN 500 MG
TABLET ORAL
Status: COMPLETED
Start: 2020-05-28 | End: 2020-05-28

## 2020-05-28 RX ORDER — HYDROMORPHONE HYDROCHLORIDE 1 MG/ML
0.4 INJECTION, SOLUTION INTRAMUSCULAR; INTRAVENOUS; SUBCUTANEOUS EVERY 2 HOUR PRN
Status: DISCONTINUED | OUTPATIENT
Start: 2020-05-28 | End: 2020-05-29

## 2020-05-28 RX ORDER — DEXAMETHASONE SODIUM PHOSPHATE 10 MG/ML
INJECTION, SOLUTION INTRAMUSCULAR; INTRAVENOUS AS NEEDED
Status: DISCONTINUED | OUTPATIENT
Start: 2020-05-28 | End: 2020-05-28 | Stop reason: SURG

## 2020-05-28 RX ORDER — ACETAMINOPHEN 500 MG
1000 TABLET ORAL 4 TIMES DAILY
Status: DISCONTINUED | OUTPATIENT
Start: 2020-05-28 | End: 2020-05-29

## 2020-05-28 RX ORDER — IPRATROPIUM BROMIDE AND ALBUTEROL SULFATE 2.5; .5 MG/3ML; MG/3ML
3 SOLUTION RESPIRATORY (INHALATION) ONCE
Status: COMPLETED | OUTPATIENT
Start: 2020-05-28 | End: 2020-05-28

## 2020-05-28 RX ORDER — ONDANSETRON 2 MG/ML
INJECTION INTRAMUSCULAR; INTRAVENOUS AS NEEDED
Status: DISCONTINUED | OUTPATIENT
Start: 2020-05-28 | End: 2020-05-28 | Stop reason: SURG

## 2020-05-28 RX ORDER — OXYCODONE HYDROCHLORIDE 15 MG/1
15 TABLET ORAL EVERY 4 HOURS PRN
Status: DISCONTINUED | OUTPATIENT
Start: 2020-05-28 | End: 2020-05-29

## 2020-05-28 RX ORDER — ACETAMINOPHEN 500 MG
1000 TABLET ORAL ONCE
Status: DISCONTINUED | OUTPATIENT
Start: 2020-05-28 | End: 2020-05-28 | Stop reason: ALTCHOICE

## 2020-05-28 RX ORDER — MIDAZOLAM HYDROCHLORIDE 1 MG/ML
INJECTION INTRAMUSCULAR; INTRAVENOUS AS NEEDED
Status: DISCONTINUED | OUTPATIENT
Start: 2020-05-28 | End: 2020-05-28 | Stop reason: SURG

## 2020-05-28 RX ORDER — BUPIVACAINE HYDROCHLORIDE 2.5 MG/ML
INJECTION, SOLUTION EPIDURAL; INFILTRATION; INTRACAUDAL AS NEEDED
Status: DISCONTINUED | OUTPATIENT
Start: 2020-05-28 | End: 2020-05-28 | Stop reason: SURG

## 2020-05-28 RX ORDER — HYDROMORPHONE HYDROCHLORIDE 1 MG/ML
INJECTION, SOLUTION INTRAMUSCULAR; INTRAVENOUS; SUBCUTANEOUS
Status: COMPLETED
Start: 2020-05-28 | End: 2020-05-28

## 2020-05-28 RX ORDER — CEFAZOLIN SODIUM/WATER 2 G/20 ML
2 SYRINGE (ML) INTRAVENOUS ONCE
Status: COMPLETED | OUTPATIENT
Start: 2020-05-28 | End: 2020-05-28

## 2020-05-28 RX ORDER — GABAPENTIN 300 MG/1
300 CAPSULE ORAL 3 TIMES DAILY
Status: DISCONTINUED | OUTPATIENT
Start: 2020-05-28 | End: 2020-05-29

## 2020-05-28 RX ORDER — LIDOCAINE HYDROCHLORIDE 10 MG/ML
INJECTION, SOLUTION EPIDURAL; INFILTRATION; INTRACAUDAL; PERINEURAL AS NEEDED
Status: DISCONTINUED | OUTPATIENT
Start: 2020-05-28 | End: 2020-05-28 | Stop reason: SURG

## 2020-05-28 RX ORDER — ONDANSETRON 2 MG/ML
4 INJECTION INTRAMUSCULAR; INTRAVENOUS EVERY 4 HOURS PRN
Status: DISCONTINUED | OUTPATIENT
Start: 2020-05-28 | End: 2020-05-29

## 2020-05-28 RX ORDER — HYDROMORPHONE HYDROCHLORIDE 1 MG/ML
0.4 INJECTION, SOLUTION INTRAMUSCULAR; INTRAVENOUS; SUBCUTANEOUS EVERY 5 MIN PRN
Status: DISCONTINUED | OUTPATIENT
Start: 2020-05-28 | End: 2020-05-28 | Stop reason: HOSPADM

## 2020-05-28 RX ORDER — METOCLOPRAMIDE HYDROCHLORIDE 5 MG/ML
10 INJECTION INTRAMUSCULAR; INTRAVENOUS EVERY 6 HOURS PRN
Status: DISCONTINUED | OUTPATIENT
Start: 2020-05-28 | End: 2020-05-29

## 2020-05-28 RX ORDER — OXYCODONE HYDROCHLORIDE 10 MG/1
10 TABLET ORAL EVERY 4 HOURS PRN
Status: DISCONTINUED | OUTPATIENT
Start: 2020-05-28 | End: 2020-05-29

## 2020-05-28 RX ORDER — BISACODYL 10 MG
10 SUPPOSITORY, RECTAL RECTAL
Status: DISCONTINUED | OUTPATIENT
Start: 2020-05-28 | End: 2020-05-29

## 2020-05-28 RX ORDER — POLYETHYLENE GLYCOL 3350 17 G/17G
17 POWDER, FOR SOLUTION ORAL DAILY PRN
Status: DISCONTINUED | OUTPATIENT
Start: 2020-05-28 | End: 2020-05-29

## 2020-05-28 RX ORDER — PANTOPRAZOLE SODIUM 40 MG/1
40 TABLET, DELAYED RELEASE ORAL
Status: DISCONTINUED | OUTPATIENT
Start: 2020-05-29 | End: 2020-05-29

## 2020-05-28 RX ORDER — ACETAMINOPHEN 500 MG
1000 TABLET ORAL ONCE
COMMUNITY
End: 2020-06-25

## 2020-05-28 RX ORDER — POTASSIUM CHLORIDE 20 MEQ/1
20 TABLET, EXTENDED RELEASE ORAL 2 TIMES DAILY
Status: DISCONTINUED | OUTPATIENT
Start: 2020-05-28 | End: 2020-05-29

## 2020-05-28 RX ORDER — DEXAMETHASONE SODIUM PHOSPHATE 10 MG/ML
8 INJECTION, SOLUTION INTRAMUSCULAR; INTRAVENOUS ONCE
Status: COMPLETED | OUTPATIENT
Start: 2020-05-29 | End: 2020-05-29

## 2020-05-28 RX ORDER — NALOXONE HYDROCHLORIDE 0.4 MG/ML
80 INJECTION, SOLUTION INTRAMUSCULAR; INTRAVENOUS; SUBCUTANEOUS AS NEEDED
Status: DISCONTINUED | OUTPATIENT
Start: 2020-05-28 | End: 2020-05-28 | Stop reason: HOSPADM

## 2020-05-28 RX ORDER — ZOLPIDEM TARTRATE 5 MG/1
5 TABLET ORAL NIGHTLY PRN
Status: DISCONTINUED | OUTPATIENT
Start: 2020-05-28 | End: 2020-05-29

## 2020-05-28 RX ORDER — PHENYLEPHRINE HCL 10 MG/ML
VIAL (ML) INJECTION AS NEEDED
Status: DISCONTINUED | OUTPATIENT
Start: 2020-05-28 | End: 2020-05-28 | Stop reason: SURG

## 2020-05-28 RX ORDER — SCOLOPAMINE TRANSDERMAL SYSTEM 1 MG/1
1 PATCH, EXTENDED RELEASE TRANSDERMAL ONCE
Status: DISCONTINUED | OUTPATIENT
Start: 2020-05-28 | End: 2020-05-28

## 2020-05-28 RX ORDER — HYDROMORPHONE HYDROCHLORIDE 1 MG/ML
0.2 INJECTION, SOLUTION INTRAMUSCULAR; INTRAVENOUS; SUBCUTANEOUS EVERY 2 HOUR PRN
Status: DISCONTINUED | OUTPATIENT
Start: 2020-05-28 | End: 2020-05-29

## 2020-05-28 RX ORDER — HYDROMORPHONE HYDROCHLORIDE 1 MG/ML
0.8 INJECTION, SOLUTION INTRAMUSCULAR; INTRAVENOUS; SUBCUTANEOUS EVERY 2 HOUR PRN
Status: DISCONTINUED | OUTPATIENT
Start: 2020-05-28 | End: 2020-05-29

## 2020-05-28 RX ORDER — OXYCODONE HYDROCHLORIDE 5 MG/1
5 TABLET ORAL EVERY 4 HOURS PRN
Status: DISCONTINUED | OUTPATIENT
Start: 2020-05-28 | End: 2020-05-29

## 2020-05-28 RX ORDER — ENOXAPARIN SODIUM 100 MG/ML
40 INJECTION SUBCUTANEOUS DAILY
Status: DISCONTINUED | OUTPATIENT
Start: 2020-05-28 | End: 2020-05-29

## 2020-05-28 RX ORDER — DEXAMETHASONE SODIUM PHOSPHATE 4 MG/ML
VIAL (ML) INJECTION AS NEEDED
Status: DISCONTINUED | OUTPATIENT
Start: 2020-05-28 | End: 2020-05-28 | Stop reason: SURG

## 2020-05-28 RX ORDER — CEFAZOLIN SODIUM/WATER 2 G/20 ML
2 SYRINGE (ML) INTRAVENOUS EVERY 8 HOURS
Status: COMPLETED | OUTPATIENT
Start: 2020-05-28 | End: 2020-05-28

## 2020-05-28 RX ORDER — SODIUM CHLORIDE, SODIUM LACTATE, POTASSIUM CHLORIDE, CALCIUM CHLORIDE 600; 310; 30; 20 MG/100ML; MG/100ML; MG/100ML; MG/100ML
INJECTION, SOLUTION INTRAVENOUS CONTINUOUS
Status: DISCONTINUED | OUTPATIENT
Start: 2020-05-28 | End: 2020-05-28 | Stop reason: HOSPADM

## 2020-05-28 RX ORDER — PROCHLORPERAZINE EDISYLATE 5 MG/ML
10 INJECTION INTRAMUSCULAR; INTRAVENOUS EVERY 6 HOURS PRN
Status: DISCONTINUED | OUTPATIENT
Start: 2020-05-28 | End: 2020-05-29

## 2020-05-28 RX ORDER — TIZANIDINE 4 MG/1
4 TABLET ORAL 3 TIMES DAILY PRN
Status: DISCONTINUED | OUTPATIENT
Start: 2020-05-28 | End: 2020-05-29

## 2020-05-28 RX ORDER — BENAZEPRIL/HYDROCHLOROTHIAZIDE 20 MG-25MG
1 TABLET ORAL DAILY
Status: DISCONTINUED | OUTPATIENT
Start: 2020-05-28 | End: 2020-05-28 | Stop reason: SDUPTHER

## 2020-05-28 RX ORDER — BUPRENORPHINE HYDROCHLORIDE 0.32 MG/ML
INJECTION INTRAMUSCULAR; INTRAVENOUS AS NEEDED
Status: DISCONTINUED | OUTPATIENT
Start: 2020-05-28 | End: 2020-05-28 | Stop reason: SURG

## 2020-05-28 RX ORDER — DIPHENHYDRAMINE HYDROCHLORIDE 50 MG/ML
25 INJECTION INTRAMUSCULAR; INTRAVENOUS ONCE AS NEEDED
Status: ACTIVE | OUTPATIENT
Start: 2020-05-28 | End: 2020-05-28

## 2020-05-28 RX ORDER — ATORVASTATIN CALCIUM 20 MG/1
20 TABLET, FILM COATED ORAL DAILY
Status: DISCONTINUED | OUTPATIENT
Start: 2020-05-28 | End: 2020-05-29

## 2020-05-28 RX ORDER — VENLAFAXINE HYDROCHLORIDE 75 MG/1
225 CAPSULE, EXTENDED RELEASE ORAL NIGHTLY
Status: DISCONTINUED | OUTPATIENT
Start: 2020-05-28 | End: 2020-05-29

## 2020-05-28 RX ORDER — IPRATROPIUM BROMIDE AND ALBUTEROL SULFATE 2.5; .5 MG/3ML; MG/3ML
SOLUTION RESPIRATORY (INHALATION)
Status: COMPLETED
Start: 2020-05-28 | End: 2020-05-28

## 2020-05-28 RX ORDER — KETOROLAC TROMETHAMINE 30 MG/ML
30 INJECTION, SOLUTION INTRAMUSCULAR; INTRAVENOUS EVERY 6 HOURS
Status: COMPLETED | OUTPATIENT
Start: 2020-05-28 | End: 2020-05-29

## 2020-05-28 RX ADMIN — ALBUTEROL SULFATE 2 PUFF: 90 AEROSOL, METERED RESPIRATORY (INHALATION) at 08:55:00

## 2020-05-28 RX ADMIN — PHENYLEPHRINE HCL 100 MCG: 10 MG/ML VIAL (ML) INJECTION at 09:56:00

## 2020-05-28 RX ADMIN — BUPIVACAINE HYDROCHLORIDE 20 ML: 2.5 INJECTION, SOLUTION EPIDURAL; INFILTRATION; INTRACAUDAL at 07:20:00

## 2020-05-28 RX ADMIN — SODIUM CHLORIDE, SODIUM LACTATE, POTASSIUM CHLORIDE, CALCIUM CHLORIDE: 600; 310; 30; 20 INJECTION, SOLUTION INTRAVENOUS at 10:21:00

## 2020-05-28 RX ADMIN — LIDOCAINE HYDROCHLORIDE 100 MG: 10 INJECTION, SOLUTION EPIDURAL; INFILTRATION; INTRACAUDAL; PERINEURAL at 07:23:00

## 2020-05-28 RX ADMIN — PHENYLEPHRINE HCL 100 MCG: 10 MG/ML VIAL (ML) INJECTION at 09:44:00

## 2020-05-28 RX ADMIN — DEXAMETHASONE SODIUM PHOSPHATE 2 MG: 10 INJECTION, SOLUTION INTRAMUSCULAR; INTRAVENOUS at 07:20:00

## 2020-05-28 RX ADMIN — DEXAMETHASONE SODIUM PHOSPHATE 8 MG: 4 MG/ML VIAL (ML) INJECTION at 07:31:00

## 2020-05-28 RX ADMIN — CEFAZOLIN SODIUM/WATER 2 G: 2 G/20 ML SYRINGE (ML) INTRAVENOUS at 07:29:00

## 2020-05-28 RX ADMIN — MIDAZOLAM HYDROCHLORIDE 2 MG: 1 INJECTION INTRAMUSCULAR; INTRAVENOUS at 07:10:00

## 2020-05-28 RX ADMIN — BUPRENORPHINE HYDROCHLORIDE 150 MCG: 0.32 INJECTION INTRAMUSCULAR; INTRAVENOUS at 07:20:00

## 2020-05-28 RX ADMIN — ONDANSETRON 4 MG: 2 INJECTION INTRAMUSCULAR; INTRAVENOUS at 07:31:00

## 2020-05-28 RX ADMIN — ESMOLOL HYDROCHLORIDE 50 MG: 10 INJECTION INTRAVENOUS at 10:15:00

## 2020-05-28 NOTE — H&P
[address] Patient Status:  Hospital Outpatient Surgery    1960 MRN [id number]   Location [address] [place] [address] Attending Jaquan Muse MD   [address] Day # 0 PCP Suzanna Rivera MD     [facility] Oral Tab CR, Take 1 tablet by mouth 2 (two) times a day for 15 days. , Disp: 30 tablet, Rfl: 2, 5/27/2020 at 0800  VENLAFAXINE HCL ER 75 MG Oral Capsule SR 24 Hr, TAKE THREE CAPSULES BY MOUTH DAILY , Disp: 270 capsule, Rfl: 0, 5/27/2020 at 2100  BENAZEPRIL- and react to 2mm with reaction to light. Oropharynx is clear. Neck: No tenderness to palpitation. Full range of motion to flexion and extension, lateral rotation and lateral flexion of cervical spine. No JVD. Supple.    Lungs: Clear to auscultation bila right     Status post arthroscopic surgery of left knee     Numbness and tingling in right hand     Injury of right shoulder, initial encounter      R shoulder AVN with collapse of the humeral head    To OR for reverse TSA  Risk/benefit d/w patient inc inf

## 2020-05-28 NOTE — BRIEF OP NOTE
Pre-Operative Diagnosis: Avascular necrosis of right humeral head (HCC) [M87.021]  Closed fracture of proximal end of right humerus with malunion, unspecified fracture morphology, subsequent encounter [S42.201P]     Post-Operative Diagnosis: Avascular necr

## 2020-05-28 NOTE — ANESTHESIA PROCEDURE NOTES
Superior trunk block  Performed by: Argelia Matthews MD  Authorized by: Argelia Matthews MD       General Information and Staff    Start Time:  5/28/2020 7:16 AM  End Time:  5/28/2020 7:20 AM  Anesthesiologist:  Argelia Matthews MD  Performed by:   Anesthesiologist Buprenorphine    Local anesthetic deposited in perineural area between anterior and middle scalene muscles at the level of upper trunk of brachial plexus.

## 2020-05-28 NOTE — CONSULTS
Pulmonary / Critical Care H&P/Consult       NAME: Eliceo Martínez - ROOM: [address] [place] [place] [address] MRN: [id number] - Age: 56year old - :  1960    Date of Admission: 2020  5:18 AM  Admission Diagnosis: Avascular necrosis of right humeral head ([facility] [address] [G30.91 Former Smoker        Packs/day: 1.00        Years: 10.00        Pack years: 10        Quit date: 3/25/2000        Years since quittin.1      Smokeless tobacco: Never Used    Substance and Sexual Activity      Alcohol use: No      Drug use: No      Sex 2  VENLAFAXINE HCL ER 75 MG Oral Capsule SR 24 Hr, TAKE THREE CAPSULES BY MOUTH DAILY , Disp: 270 capsule, Rfl: 0  BENAZEPRIL-HYDROCHLOROTHIAZIDE 20-25 MG Oral Tab, TAKE ONE TABLET BY MOUTH ONCE DAILY, Disp: 90 tablet, Rfl: 0  ATORVASTATIN 20 MG Oral Tab, at 05/28/20 1042     PRN Medication:sodium chloride 0.9%, PEG 3350, magnesium hydroxide, bisacodyl, Fleet Enema, ondansetron HCl, Metoclopramide HCl, Prochlorperazine Edisylate, diphenhydrAMINE HCl, zolpidem, tiZANidine HCl, oxyCODONE HCl **OR** oxyCODONE texture, turgor normal, no rashes or lesions         No results for input(s): WBC, HGB, HCT, PLT in the last 168 hours. No results for input(s): INR in the last 168 hours. No results for input(s): NA, K, CL, CO2, BUN, GLUCOSE in the last 168 hours.

## 2020-05-28 NOTE — PLAN OF CARE
Pt A & O x4, on 4L O2, /IS. Tele-NSR, SCDs. Lovenox. NWB RUE. Sling. Ice pack. Foam dressing CDI. Duvol drain. IVF. PT/OT. DTV 1600, last BM 5/27. Regular diet. Pulm to see pt. Fall precautions. Arm precautions. Will continue to monitor.

## 2020-05-28 NOTE — CONSULTS
General Medicine Consult      Reason for consult:Post op hypoxia     Consulted by: Dr. Carmine Moran     PCP: Amanda Virgen MD      History of Present Illness: Patient is a 56year old female with PMH sig for avascular necrosis of R humeral head, HTN, HLD, THREE CAPSULES BY MOUTH DAILY , Disp: 270 capsule, Rfl: 0  traMADol HCl 50 MG Oral Tab, 1-2 po q 4 hrs prn, Disp: 30 tablet, Rfl: 0  OMEPRAZOLE 40 MG Oral Capsule Delayed Release, TAKE ONE CAPSULE BY MOUTH ONCE DAILY , Disp: 90 capsule, Rfl: 1  [DISCONTINU Soc Hx:  Social History    Tobacco Use      Smoking status: Former Smoker        Packs/day: 1.00        Years: 10.00        Pack years: 10        Quit date: 3/25/2000        Years since quittin.1      Smokeless tobacco: Never Used    Alcohol use: TBIL, DBIL, TPROT    Radiology: Xr Shoulder, (1 View), Right (cpt=73020)    Result Date: 5/28/2020  PROCEDURE:  XR SHOULDER, (1 VIEW), RIGHT (CPT=73020)  TECHNIQUE:  AP view of the shoulder was obtained.   COMPARISON:  [facility] , XR, XR SHOULDER, COMPLETE (MI ASSESSMENT / PLAN:    56 yr old female with PMH sig for avascular necrosis of R humeral head, HTN, HLD, GERD, and depression who presents s/p R TSA for AVN. # Hypoxia post op   · Suspect atelectasis based on CXR.   Cannot r/o aspiration event cau

## 2020-05-28 NOTE — OPERATIVE REPORT
[address]    PATIENT'S NAME: Carla Carballo   ATTENDING PHYSICIAN: Orlando Lilly M.D. OPERATING PHYSICIAN: Orlando Lilly M.D.    PATIENT ACCOUNT#:   [de-identified]    LOCATION:  [address] [place] [address]  MEDICAL RECORD #:   [id number]       DATE OF BIRTH:  [birth year] followed by general endotracheal anesthesia. The patient was placed in modified beach chair position. The right shoulder was prepped and draped in standard surgical fashion.   A surgical time-out was taken where per patient, surgical site, and procedure w Anterior and posterior screws were nonlocking screws. The locking screws were then locked into position. The shoulder was then dislocated, and the metaphyseal humeral component was then prepped. The guide in the reamer was used.   A size 1 eccentric meta

## 2020-05-28 NOTE — ANESTHESIA PREPROCEDURE EVALUATION
PRE-OP EVALUATION    Patient Name: Yohannes Guzman    Pre-op Diagnosis: Avascular necrosis of right humeral head ([facility] [M87.021]  Closed fracture of proximal end of right humerus with malunion, unspecified fracture morphology, subsequent encounter [S42. 2 gabapentin 300 MG Oral Cap, Take 1 capsule (300 mg total) by mouth 3 (three) times daily. , Disp: 90 capsule, Rfl: 12  [DISCONTINUED] ATORVASTATIN 20 MG Oral Tab, TAKE ONE TABLET BY MOUTH ONCE DAILY, Disp: 90 tablet, Rfl: 1  [DISCONTINUED] BENAZEPRIL-HYDROC .0 05/18/2020     Lab Results   Component Value Date     05/18/2020    K 2.9 (LL) 05/18/2020    CL 99 05/18/2020    CO2 30.0 05/18/2020    BUN 17 05/18/2020    CREATSERUM 0.92 05/18/2020     (H) 05/18/2020    CA 9.4 05/18/2020 I explained benefits of brachial plexus blocks nerve block to [facility] including significant (but unlikely complete) pain relief following the surgical procedure, decreased need for postoperative opioid use.  Realistic expectation for block duratio

## 2020-05-28 NOTE — ANESTHESIA PROCEDURE NOTES
Airway  Date/Time: 5/28/2020 7:25 AM  Urgency: elective      General Information and Staff    Patient location during procedure: OR  Anesthesiologist: Doe Morris MD  Performed: anesthesiologist     Indications and Patient Condition  Indications for airw

## 2020-05-28 NOTE — ANESTHESIA POSTPROCEDURE EVALUATION
[address] [place] [address] Patient Status:  Hospital Outpatient Surgery   Age/Gender 56year old female MRN [id number]   [facility] SURGERY Attending Jaquan Muse MD   Hosp Day # 0 PCP Suzanna Rivera MD       Anesthesia Post-o

## 2020-05-28 NOTE — ANESTHESIA PROCEDURE NOTES
Arterial Line  Performed by: Argelia Matthews MD  Authorized by: Argelia Matthews MD     General Information and Staff    Procedure Start:  5/28/2020 9:08 AM  Procedure End:  5/28/2020 9:09 AM  Anesthesiologist:  Argelia Matthews MD  Performed By:  Sarita Rodriguez

## 2020-05-28 NOTE — ANESTHESIA PROCEDURE NOTES
Airway  Date/Time: 5/28/2020 9:18 AM  Urgency: elective      General Information and Staff    Patient location during procedure: OR  Anesthesiologist: Dianna Tan MD  Performed: anesthesiologist     Indications and Patient Condition  Indications for airw

## 2020-05-29 ENCOUNTER — APPOINTMENT (OUTPATIENT)
Dept: GENERAL RADIOLOGY | Facility: HOSPITAL | Age: 60
DRG: 483 | End: 2020-05-29
Attending: INTERNAL MEDICINE
Payer: MEDICAID

## 2020-05-29 VITALS
HEART RATE: 76 BPM | RESPIRATION RATE: 25 BRPM | OXYGEN SATURATION: 93 % | TEMPERATURE: 98 F | WEIGHT: 242.5 LBS | BODY MASS INDEX: 35.92 KG/M2 | SYSTOLIC BLOOD PRESSURE: 127 MMHG | DIASTOLIC BLOOD PRESSURE: 65 MMHG | HEIGHT: 69 IN

## 2020-05-29 PROCEDURE — 80048 BASIC METABOLIC PNL TOTAL CA: CPT | Performed by: INTERNAL MEDICINE

## 2020-05-29 PROCEDURE — 97165 OT EVAL LOW COMPLEX 30 MIN: CPT

## 2020-05-29 PROCEDURE — 85027 COMPLETE CBC AUTOMATED: CPT | Performed by: ORTHOPAEDIC SURGERY

## 2020-05-29 PROCEDURE — 97535 SELF CARE MNGMENT TRAINING: CPT

## 2020-05-29 PROCEDURE — 97530 THERAPEUTIC ACTIVITIES: CPT

## 2020-05-29 PROCEDURE — 71045 X-RAY EXAM CHEST 1 VIEW: CPT | Performed by: INTERNAL MEDICINE

## 2020-05-29 RX ORDER — OXYCODONE HYDROCHLORIDE 5 MG/1
5 TABLET ORAL EVERY 4 HOURS PRN
Status: DISCONTINUED | OUTPATIENT
Start: 2020-05-29 | End: 2020-05-29

## 2020-05-29 RX ORDER — HYDROMORPHONE HYDROCHLORIDE 1 MG/ML
0.4 INJECTION, SOLUTION INTRAMUSCULAR; INTRAVENOUS; SUBCUTANEOUS EVERY 2 HOUR PRN
Status: DISCONTINUED | OUTPATIENT
Start: 2020-05-29 | End: 2020-05-29

## 2020-05-29 RX ORDER — ACETAMINOPHEN 500 MG
1000 TABLET ORAL 4 TIMES DAILY
Status: DISCONTINUED | OUTPATIENT
Start: 2020-05-29 | End: 2020-05-29

## 2020-05-29 RX ORDER — OXYCODONE HYDROCHLORIDE 15 MG/1
15 TABLET ORAL EVERY 4 HOURS PRN
Status: DISCONTINUED | OUTPATIENT
Start: 2020-05-29 | End: 2020-05-29

## 2020-05-29 RX ORDER — HYDROMORPHONE HYDROCHLORIDE 1 MG/ML
0.5 INJECTION, SOLUTION INTRAMUSCULAR; INTRAVENOUS; SUBCUTANEOUS EVERY 2 HOUR PRN
Status: DISCONTINUED | OUTPATIENT
Start: 2020-05-29 | End: 2020-05-29

## 2020-05-29 RX ORDER — TIZANIDINE 2 MG/1
2 TABLET ORAL 3 TIMES DAILY PRN
Status: DISCONTINUED | OUTPATIENT
Start: 2020-05-29 | End: 2020-05-29

## 2020-05-29 RX ORDER — OXYCODONE HYDROCHLORIDE 10 MG/1
10 TABLET ORAL EVERY 4 HOURS PRN
Status: DISCONTINUED | OUTPATIENT
Start: 2020-05-29 | End: 2020-05-29

## 2020-05-29 RX ORDER — OXYCODONE HYDROCHLORIDE AND ACETAMINOPHEN 5; 325 MG/1; MG/1
1 TABLET ORAL EVERY 4 HOURS PRN
Qty: 20 TABLET | Refills: 0 | Status: SHIPPED | OUTPATIENT
Start: 2020-05-29 | End: 2020-06-08

## 2020-05-29 RX ORDER — HYDROMORPHONE HYDROCHLORIDE 1 MG/ML
0.2 INJECTION, SOLUTION INTRAMUSCULAR; INTRAVENOUS; SUBCUTANEOUS EVERY 2 HOUR PRN
Status: DISCONTINUED | OUTPATIENT
Start: 2020-05-29 | End: 2020-05-29

## 2020-05-29 NOTE — OCCUPATIONAL THERAPY NOTE
OCCUPATIONAL THERAPY QUICK EVALUATION - INPATIENT    Room Number: 323/323-A  Evaluation Date: 5/29/2020     Type of Evaluation: Quick Eval  Presenting Problem: (R reverse TSA)    Physician Order: IP Consult to Occupational Therapy  Reason for Therapy:  ADL Bearing Restriction: R upper extremity  R Upper Extremity: Non-Weight Bearing             PAIN ASSESSMENT  Ratin  Location: (anterior R shoulder)  Management Techniques: Relaxation;Repositioning    COGNITION  wfl    RANGE OF MOTION AND STRENGTH ASSESSM supervision to independent. Patient demonstrated no LOB and denied any concerns about her walking or balance this date. OT educated patient to use railings when using stairs at home, understanding was voiced.   Patient was educated on how to remove and do problem-focused assessments, limited treatment options    Overall Complexity  LOW     OT Discharge Recommendations: Home(assistance during recovery)  OT Device Recommendations: None    PLAN   Patient has been evaluated and presents with no skilled Occupati

## 2020-05-29 NOTE — PLAN OF CARE
Patient had some fresh bleeding from the surgical dressing ,reinforced dressing ,started having some pain oxycodone given will continue to monitor. 77

## 2020-05-29 NOTE — PROGRESS NOTES
Post Op Day 1 Ortho Note: Right Reverse TSA    Status Post Nerve Block:  Type of Nerve Block: Right supraclavicular  Single Injection Nerve Block    Post op review: No evidence of immediate block related complications and No paresthesia noted    Assessed p

## 2020-05-29 NOTE — PLAN OF CARE
Chest xray result reported to Dr. Chantel Johnson from Pulmonary. [address] [address] for discharge home today. Follow-up with Pulmonary in 2-3 weeks.

## 2020-05-29 NOTE — PHYSICAL THERAPY NOTE
PT orders recd through TSA order set. Pt evaluated by OT, Dejah, and pt found to be at independent level for all fxal mobility. According to current rehab dept policy, due to pt ind with mobility, PT evaluation not necessary. Will dc current orders.   See

## 2020-05-29 NOTE — PROGRESS NOTES
Pulmonary Progress Note      NAME: Delmis Bogard - ROOM: 307/698-J - MRN: [id number] - Age: 56year old - : 1960    Assessment/Plan:  1. Hypoxia: suspect a combination of untreated alexandre as well as R hemidiaphragm paralysis based on cxr.  I doubt pn GI: Soft, NTND, +normoactive BS   Ext: No cyanosis, clubbing or edema     Recent Labs   Lab 05/28/20  1435 05/29/20  0602   RBC 3.96 3.02*   HGB 12.2 9.2*   HCT 36.3 28.2*   MCV 91.7 93.4   MCH 30.8 30.5   MCHC 33.6 32.6   RDW 13.1 13.2   NEPRELIM 16.59*

## 2020-05-29 NOTE — PROGRESS NOTES
[facility] Hospitalist Progress Note                                                                   [address] [facility]  5/8/1960    SUBJECTIVE:  Pt seen and examined. States she is feeling good. mL/hr at 05/28/20 0604   • lactated ringers 125 mL/hr at 05/29/20 0811     PRN: HYDROmorphone HCl **OR** HYDROmorphone HCl **OR** HYDROmorphone HCl, tiZANidine HCl, oxyCODONE HCl **OR** oxyCODONE HCl **OR** oxyCODONE HCl, sodium chloride 0.9%, PEG 3350, ma

## 2020-05-29 NOTE — PLAN OF CARE
Patient alert and oriented ,gets up with min assist ,right shoulder dressing clean and dry ,ice and sling in place ,duvol drain intact with moderate amount of drainage ,still has some numbness to right arm ,strong radial pulses ,blood pressure 79/46 ,heart

## 2020-05-29 NOTE — PROGRESS NOTES
[facility]  Progress Note    Hadley Enriquez Patient Status:  Inpatient    1960 MRN [id number]   [facility] [address]-A Attending Juan Carlos Nixon MD   [address] Day # 1 PCP Bebeto Franz MD     Subjective:  Hadley Enriquez is a(n) 56 subsequent encounter     Hypokalemia     Multiple falls     Acute pain of right shoulder     Right ear pain     Ceruminosis, right     Complex tear of medial meniscus of left knee as current injury, subsequent encounter     Orthopedic aftercare     Andi Nichols

## 2020-05-30 NOTE — PLAN OF CARE
Pt cleared for discharge home today. Written and verbal discharge education provided for pt, all questions answered. Percocet script provided for pt. Pt discharged home via family transport.

## 2020-07-05 PROBLEM — M25.511 ACUTE POSTOPERATIVE PAIN OF RIGHT SHOULDER: Status: ACTIVE | Noted: 2020-07-05

## 2020-07-05 PROBLEM — G89.18 ACUTE POSTOPERATIVE PAIN OF RIGHT SHOULDER: Status: ACTIVE | Noted: 2020-07-05

## 2020-07-11 ENCOUNTER — APPOINTMENT (OUTPATIENT)
Dept: GENERAL RADIOLOGY | Facility: HOSPITAL | Age: 60
End: 2020-07-11
Attending: STUDENT IN AN ORGANIZED HEALTH CARE EDUCATION/TRAINING PROGRAM
Payer: COMMERCIAL

## 2020-07-11 ENCOUNTER — HOSPITAL ENCOUNTER (EMERGENCY)
Facility: HOSPITAL | Age: 60
Discharge: HOME | End: 2020-07-11
Attending: STUDENT IN AN ORGANIZED HEALTH CARE EDUCATION/TRAINING PROGRAM
Payer: COMMERCIAL

## 2020-07-11 ENCOUNTER — APPOINTMENT (OUTPATIENT)
Dept: ULTRASOUND IMAGING | Facility: HOSPITAL | Age: 60
End: 2020-07-11
Attending: STUDENT IN AN ORGANIZED HEALTH CARE EDUCATION/TRAINING PROGRAM
Payer: COMMERCIAL

## 2020-07-11 VITALS
WEIGHT: 245 LBS | HEIGHT: 67 IN | RESPIRATION RATE: 18 BRPM | OXYGEN SATURATION: 98 % | BODY MASS INDEX: 38.45 KG/M2 | SYSTOLIC BLOOD PRESSURE: 126 MMHG | DIASTOLIC BLOOD PRESSURE: 67 MMHG | HEART RATE: 75 BPM | TEMPERATURE: 99 F

## 2020-07-11 DIAGNOSIS — M25.472 LEFT ANKLE SWELLING: ICD-10-CM

## 2020-07-11 DIAGNOSIS — M19.90 ARTHRITIS: ICD-10-CM

## 2020-07-11 DIAGNOSIS — M25.572 ACUTE LEFT ANKLE PAIN: Primary | ICD-10-CM

## 2020-07-11 PROCEDURE — 99284 EMERGENCY DEPT VISIT MOD MDM: CPT

## 2020-07-11 PROCEDURE — 73610 X-RAY EXAM OF ANKLE: CPT | Performed by: STUDENT IN AN ORGANIZED HEALTH CARE EDUCATION/TRAINING PROGRAM

## 2020-07-11 PROCEDURE — 96372 THER/PROPH/DIAG INJ SC/IM: CPT

## 2020-07-11 PROCEDURE — 93971 EXTREMITY STUDY: CPT | Performed by: STUDENT IN AN ORGANIZED HEALTH CARE EDUCATION/TRAINING PROGRAM

## 2020-07-11 RX ORDER — KETOROLAC TROMETHAMINE 30 MG/ML
30 INJECTION, SOLUTION INTRAMUSCULAR; INTRAVENOUS ONCE
Status: COMPLETED | OUTPATIENT
Start: 2020-07-11 | End: 2020-07-11

## 2020-07-11 RX ORDER — DIFLUNISAL 500 MG/1
500 TABLET, FILM COATED ORAL 3 TIMES DAILY
Qty: 30 TABLET | Refills: 0 | Status: SHIPPED | OUTPATIENT
Start: 2020-07-11 | End: 2020-07-13 | Stop reason: ALTCHOICE

## 2020-07-11 RX ORDER — HYDROCODONE BITARTRATE AND ACETAMINOPHEN 5; 325 MG/1; MG/1
1-2 TABLET ORAL EVERY 6 HOURS PRN
Qty: 10 TABLET | Refills: 0 | Status: SHIPPED | OUTPATIENT
Start: 2020-07-11 | End: 2020-07-18

## 2020-07-11 RX ORDER — HYDROCODONE BITARTRATE AND ACETAMINOPHEN 5; 325 MG/1; MG/1
2 TABLET ORAL ONCE
Status: COMPLETED | OUTPATIENT
Start: 2020-07-11 | End: 2020-07-11

## 2020-07-12 NOTE — ED PROVIDER NOTES
Patient Seen in: [facility] Emergency Department      History   Patient presents with:  Swelling Edema    Stated Complaint: c/o L ankle pain and swelling, no injury    HPI    Patient is a 57-year-old female who presents emergency department reportin Temp 99.2 °F (37.3 °C)   Temp src Temporal   SpO2 97 %   O2 Device None (Room air)       Current:/67   Pulse 75   Temp 99.2 °F (37.3 °C) (Temporal)   Resp 18   Ht 170.2 cm (5' 7\")   Wt 111.1 kg   SpO2 98%   BMI 38.37 kg/m²         Physical Exam Patient demonstrate understanding, she is comfortable with the plan will follow-up as directed.       Disposition and Plan     Clinical Impression:  Acute left ankle pain  (primary encounter diagnosis)  Left ankle swelling  Arthritis    Disposition:  Bianca Sánchez

## 2020-10-16 ENCOUNTER — OFFICE VISIT (OUTPATIENT)
Dept: FAMILY MEDICINE CLINIC | Facility: CLINIC | Age: 60
End: 2020-10-16
Payer: MEDICAID

## 2020-10-16 VITALS
SYSTOLIC BLOOD PRESSURE: 116 MMHG | HEIGHT: 69 IN | OXYGEN SATURATION: 99 % | BODY MASS INDEX: 38.34 KG/M2 | RESPIRATION RATE: 20 BRPM | WEIGHT: 258.88 LBS | DIASTOLIC BLOOD PRESSURE: 72 MMHG | HEART RATE: 88 BPM | TEMPERATURE: 99 F

## 2020-10-16 DIAGNOSIS — Z20.822 EXPOSURE TO COVID-19 VIRUS: Primary | ICD-10-CM

## 2020-10-16 PROCEDURE — 3008F BODY MASS INDEX DOCD: CPT | Performed by: NURSE PRACTITIONER

## 2020-10-16 PROCEDURE — 99213 OFFICE O/P EST LOW 20 MIN: CPT | Performed by: NURSE PRACTITIONER

## 2020-10-16 PROCEDURE — 3078F DIAST BP <80 MM HG: CPT | Performed by: NURSE PRACTITIONER

## 2020-10-16 PROCEDURE — 3074F SYST BP LT 130 MM HG: CPT | Performed by: NURSE PRACTITIONER

## 2020-10-16 NOTE — PATIENT INSTRUCTIONS
Coronavirus Disease 2019 (COVID-19)     [place] [address] is committed to the safety and well-being of our patients, members, employees, and communities.  As concerns arise about the new strain of coronavirus that causes COVID-19, [place] [address] 4. If you have a medical appointment, call the healthcare provider ahead of time and tell them that you have or may have COVID-19.  5. For medical emergencies, call 911 and notify the dispatch personnel that you have or may have COVID-19.   6. Cover your c · At least 10 days have passed since symptoms first appeared OR if asymptomatic patient or date of symptom onset is unclear then use 10 days post COVID test date.    · At least 20 days have passed for severe illness (requiring hospitalization) OR if you are *Some people will be required to have a repeat COVID-19 test in order to be eligible to donate. If you’re instructed by Vinny Oliveira that a repeat test is required, please contact the [facility] COVID-19 Nurse Triage Line at [phone].     Additional Inf

## 2020-10-16 NOTE — PROGRESS NOTES
CHIEF COMPLAINT:   Patient presents with:  Sore Throat: body ache, x2days      HPI:   Sharonda Campo is a 56year old female who presents for upper respiratory symptoms for  2 days. Patient reports low grade fever, scratchy throat.  Symptoms have been un Performed by Juan Carlos Nixon MD at [facility] MAIN OR         Social History    Tobacco Use      Smoking status: Former Smoker        Packs/day: 1.00        Years: 10.00        Pack years: 10        Quit date: 3/25/2000        Years since quittin.5      Smoke No prescriptions requested or ordered in this encounter     Risks, benefits, and side effects of medication explained and discussed. The patient indicates understanding of these issues and agrees to the plan.   The patient is asked to f/u with PCP if sx 1. Stay home from work, school, and away from other public places. If you must go out, avoid using any kind of public transportation, ridesharing, or taxis. 2. Monitor your symptoms carefully.  If your symptoms get worse, call your healthcare provider imm If you have tested positive for COVID-19, you should remain under home isolation precautions following the below guidelines:  ? At least 24 hours have passed since recovery defined as resolution of fever without the use of fever-reducing medications; and If you would be interested in donating your plasma to help treat others diagnosed with the virus, please contact [facility] directly on their website: [email or web address]    Who is eligible to donate convalescent plasma?

## 2021-01-07 PROBLEM — M76.822 POSTERIOR TIBIAL TENDON DYSFUNCTION (PTTD) OF LEFT LOWER EXTREMITY: Status: ACTIVE | Noted: 2021-01-07

## 2021-01-14 ENCOUNTER — HOSPITAL ENCOUNTER (EMERGENCY)
Facility: HOSPITAL | Age: 61
Discharge: HOME | End: 2021-01-14
Attending: EMERGENCY MEDICINE
Payer: MEDICAID

## 2021-01-14 ENCOUNTER — APPOINTMENT (OUTPATIENT)
Dept: GENERAL RADIOLOGY | Facility: HOSPITAL | Age: 61
End: 2021-01-14
Attending: EMERGENCY MEDICINE
Payer: MEDICAID

## 2021-01-14 VITALS
BODY MASS INDEX: 36.29 KG/M2 | TEMPERATURE: 98 F | WEIGHT: 245 LBS | DIASTOLIC BLOOD PRESSURE: 71 MMHG | HEART RATE: 85 BPM | RESPIRATION RATE: 18 BRPM | OXYGEN SATURATION: 96 % | HEIGHT: 69 IN | SYSTOLIC BLOOD PRESSURE: 109 MMHG

## 2021-01-14 DIAGNOSIS — M17.11 ARTHRITIS OF RIGHT KNEE: Primary | ICD-10-CM

## 2021-01-14 PROCEDURE — 99283 EMERGENCY DEPT VISIT LOW MDM: CPT

## 2021-01-14 PROCEDURE — 73562 X-RAY EXAM OF KNEE 3: CPT | Performed by: EMERGENCY MEDICINE

## 2021-01-14 PROCEDURE — 99284 EMERGENCY DEPT VISIT MOD MDM: CPT

## 2021-01-14 RX ORDER — HYDROCODONE BITARTRATE AND ACETAMINOPHEN 5; 325 MG/1; MG/1
2 TABLET ORAL ONCE
Status: COMPLETED | OUTPATIENT
Start: 2021-01-14 | End: 2021-01-14

## 2021-01-14 RX ORDER — HYDROCODONE BITARTRATE AND ACETAMINOPHEN 5; 325 MG/1; MG/1
1-2 TABLET ORAL EVERY 6 HOURS PRN
Qty: 20 TABLET | Refills: 0 | Status: SHIPPED | OUTPATIENT
Start: 2021-01-14 | End: 2021-01-17

## 2021-01-14 NOTE — ED INITIAL ASSESSMENT (HPI)
Patient here with report have having arthritis in right knee yesterday was going up and down a lot of stairs and then developed severe pain.

## 2021-01-15 NOTE — ED PROVIDER NOTES
Patient Seen in: [facility] Emergency Department      History   Patient presents with:  Leg or Foot Injury    Stated Complaint: lots of activity yesterday and now cant put weight on right knee    HPI/Subjective:   HPI    Patient is a 24-year-old fem All other systems reviewed and negative except as noted above.     Physical Exam     ED Triage Vitals [01/14/21 1533]   /71   Pulse 85   Resp 18   Temp 97.5 °F (36.4 °C)   Temp src Temporal   SpO2 96 %   O2 Device None (Room air)       Current:BP Ellie Hernandez MD on 1/14/2021 at [address] PM              [facility]      Patient is a 15-year-old female comes emergency room for evaluation of right knee pain. X-rays show arthritis. No signs of infection on exam.  No clinical evidence for DVT.   Patient will be p

## 2021-04-15 ENCOUNTER — HOSPITAL ENCOUNTER (EMERGENCY)
Age: 61
Discharge: HOME | End: 2021-04-15
Attending: EMERGENCY MEDICINE
Payer: MEDICAID

## 2021-04-15 ENCOUNTER — APPOINTMENT (OUTPATIENT)
Dept: GENERAL RADIOLOGY | Age: 61
End: 2021-04-15
Attending: EMERGENCY MEDICINE
Payer: MEDICAID

## 2021-04-15 VITALS
TEMPERATURE: 97 F | SYSTOLIC BLOOD PRESSURE: 134 MMHG | WEIGHT: 225 LBS | HEART RATE: 91 BPM | BODY MASS INDEX: 33.33 KG/M2 | HEIGHT: 69 IN | DIASTOLIC BLOOD PRESSURE: 62 MMHG | OXYGEN SATURATION: 96 % | RESPIRATION RATE: 18 BRPM

## 2021-04-15 DIAGNOSIS — S93.401A MODERATE RIGHT ANKLE SPRAIN, INITIAL ENCOUNTER: ICD-10-CM

## 2021-04-15 DIAGNOSIS — S93.601A SPRAIN OF RIGHT FOOT, INITIAL ENCOUNTER: Primary | ICD-10-CM

## 2021-04-15 PROCEDURE — 73630 X-RAY EXAM OF FOOT: CPT | Performed by: EMERGENCY MEDICINE

## 2021-04-15 PROCEDURE — 99283 EMERGENCY DEPT VISIT LOW MDM: CPT

## 2021-04-15 PROCEDURE — 73610 X-RAY EXAM OF ANKLE: CPT | Performed by: EMERGENCY MEDICINE

## 2021-04-16 NOTE — ED PROVIDER NOTES
Patient Seen in: [facility] Emergency Department In [place]      History   Patient presents with:  Leg or Foot Injury    Stated Complaint: R foot and ankle pain/swelling for 1 week    HPI/Subjective:   HPI    Patient presents with right foot and ankle chris Years since quittin.0      Smokeless tobacco: Never Used    Vaping Use      Vaping Use: Never used    Alcohol use: No    Drug use:  No             Review of Systems    Positive for stated complaint: R foot and ankle pain/swelling for 1 week  Other sy diagnosis)  Moderate right ankle sprain, initial encounter     Disposition:  There is no disposition on file for this visit. There is no disposition time on file for this visit.     Follow-up:  Shalonda Judd, [address] [place] [address]

## 2021-05-28 PROBLEM — K21.9 GASTROESOPHAGEAL REFLUX DISEASE WITHOUT ESOPHAGITIS: Status: ACTIVE | Noted: 2021-05-28

## 2021-06-06 ENCOUNTER — HOSPITAL ENCOUNTER (OUTPATIENT)
Facility: HOSPITAL | Age: 61
Setting detail: OBSERVATION
Discharge: HOME | End: 2021-06-07
Attending: EMERGENCY MEDICINE | Admitting: HOSPITALIST
Payer: MEDICAID

## 2021-06-06 ENCOUNTER — APPOINTMENT (OUTPATIENT)
Dept: GENERAL RADIOLOGY | Facility: HOSPITAL | Age: 61
End: 2021-06-06
Payer: MEDICAID

## 2021-06-06 DIAGNOSIS — E87.6 HYPOKALEMIA: ICD-10-CM

## 2021-06-06 DIAGNOSIS — R07.9 ACUTE CHEST PAIN: Primary | ICD-10-CM

## 2021-06-06 PROCEDURE — 80053 COMPREHEN METABOLIC PANEL: CPT | Performed by: EMERGENCY MEDICINE

## 2021-06-06 PROCEDURE — 93005 ELECTROCARDIOGRAM TRACING: CPT

## 2021-06-06 PROCEDURE — 80053 COMPREHEN METABOLIC PANEL: CPT

## 2021-06-06 PROCEDURE — 93010 ELECTROCARDIOGRAM REPORT: CPT

## 2021-06-06 PROCEDURE — 71045 X-RAY EXAM CHEST 1 VIEW: CPT

## 2021-06-06 PROCEDURE — 84484 ASSAY OF TROPONIN QUANT: CPT | Performed by: HOSPITALIST

## 2021-06-06 PROCEDURE — 83735 ASSAY OF MAGNESIUM: CPT | Performed by: EMERGENCY MEDICINE

## 2021-06-06 PROCEDURE — 85025 COMPLETE CBC W/AUTO DIFF WBC: CPT

## 2021-06-06 PROCEDURE — 80061 LIPID PANEL: CPT | Performed by: HOSPITALIST

## 2021-06-06 PROCEDURE — 84484 ASSAY OF TROPONIN QUANT: CPT

## 2021-06-06 PROCEDURE — 84484 ASSAY OF TROPONIN QUANT: CPT | Performed by: EMERGENCY MEDICINE

## 2021-06-06 PROCEDURE — 99285 EMERGENCY DEPT VISIT HI MDM: CPT

## 2021-06-06 PROCEDURE — 85025 COMPLETE CBC W/AUTO DIFF WBC: CPT | Performed by: EMERGENCY MEDICINE

## 2021-06-06 PROCEDURE — 36415 COLL VENOUS BLD VENIPUNCTURE: CPT

## 2021-06-06 RX ORDER — PANTOPRAZOLE SODIUM 40 MG/1
40 TABLET, DELAYED RELEASE ORAL
Status: DISCONTINUED | OUTPATIENT
Start: 2021-06-07 | End: 2021-06-07

## 2021-06-06 RX ORDER — ONDANSETRON 2 MG/ML
4 INJECTION INTRAMUSCULAR; INTRAVENOUS EVERY 6 HOURS PRN
Status: DISCONTINUED | OUTPATIENT
Start: 2021-06-06 | End: 2021-06-07

## 2021-06-06 RX ORDER — POLYETHYLENE GLYCOL 3350 17 G/17G
17 POWDER, FOR SOLUTION ORAL DAILY PRN
Status: DISCONTINUED | OUTPATIENT
Start: 2021-06-06 | End: 2021-06-07

## 2021-06-06 RX ORDER — GABAPENTIN 300 MG/1
600 CAPSULE ORAL 2 TIMES DAILY
Status: DISCONTINUED | OUTPATIENT
Start: 2021-06-07 | End: 2021-06-07

## 2021-06-06 RX ORDER — ACETAMINOPHEN 500 MG
1000 TABLET ORAL ONCE
Status: COMPLETED | OUTPATIENT
Start: 2021-06-06 | End: 2021-06-06

## 2021-06-06 RX ORDER — ACETAMINOPHEN 325 MG/1
650 TABLET ORAL EVERY 6 HOURS PRN
Status: DISCONTINUED | OUTPATIENT
Start: 2021-06-06 | End: 2021-06-07

## 2021-06-06 RX ORDER — ASPIRIN 81 MG/1
81 TABLET ORAL DAILY
Status: DISCONTINUED | OUTPATIENT
Start: 2021-06-06 | End: 2021-06-07

## 2021-06-06 RX ORDER — NITROGLYCERIN 0.4 MG/1
0.4 TABLET SUBLINGUAL ONCE
Status: COMPLETED | OUTPATIENT
Start: 2021-06-06 | End: 2021-06-06

## 2021-06-06 RX ORDER — ATORVASTATIN CALCIUM 20 MG/1
20 TABLET, FILM COATED ORAL DAILY
Status: DISCONTINUED | OUTPATIENT
Start: 2021-06-06 | End: 2021-06-07

## 2021-06-06 RX ORDER — GABAPENTIN 300 MG/1
900 CAPSULE ORAL NIGHTLY
Status: DISCONTINUED | OUTPATIENT
Start: 2021-06-06 | End: 2021-06-07

## 2021-06-06 RX ORDER — BENAZEPRIL/HYDROCHLOROTHIAZIDE 20 MG-25MG
1 TABLET ORAL DAILY
Status: DISCONTINUED | OUTPATIENT
Start: 2021-06-07 | End: 2021-06-06 | Stop reason: SDUPTHER

## 2021-06-06 RX ORDER — VENLAFAXINE HYDROCHLORIDE 75 MG/1
225 CAPSULE, EXTENDED RELEASE ORAL DAILY
Status: DISCONTINUED | OUTPATIENT
Start: 2021-06-06 | End: 2021-06-07

## 2021-06-06 RX ORDER — BUSPIRONE HYDROCHLORIDE 5 MG/1
10 TABLET ORAL 3 TIMES DAILY
Status: DISCONTINUED | OUTPATIENT
Start: 2021-06-06 | End: 2021-06-07

## 2021-06-06 RX ORDER — BISACODYL 10 MG
10 SUPPOSITORY, RECTAL RECTAL
Status: DISCONTINUED | OUTPATIENT
Start: 2021-06-06 | End: 2021-06-07

## 2021-06-06 RX ORDER — TRAZODONE HYDROCHLORIDE 50 MG/1
150 TABLET ORAL NIGHTLY
Status: DISCONTINUED | OUTPATIENT
Start: 2021-06-06 | End: 2021-06-07

## 2021-06-06 RX ORDER — POTASSIUM CHLORIDE 20 MEQ/1
80 TABLET, EXTENDED RELEASE ORAL ONCE
Status: COMPLETED | OUTPATIENT
Start: 2021-06-06 | End: 2021-06-06

## 2021-06-06 RX ORDER — ASPIRIN 81 MG/1
TABLET, CHEWABLE ORAL
Status: DISPENSED
Start: 2021-06-06 | End: 2021-06-07

## 2021-06-06 RX ORDER — ALPRAZOLAM 0.25 MG/1
0.25 TABLET ORAL NIGHTLY PRN
Status: DISCONTINUED | OUTPATIENT
Start: 2021-06-06 | End: 2021-06-07

## 2021-06-06 NOTE — ED INITIAL ASSESSMENT (HPI)
Left sided chest pain, nausea x1 hour, pain wrapping around to back. Pt states pain is subsiding now.   No other symptoms

## 2021-06-07 ENCOUNTER — APPOINTMENT (OUTPATIENT)
Dept: CV DIAGNOSTICS | Facility: HOSPITAL | Age: 61
End: 2021-06-07
Attending: INTERNAL MEDICINE
Payer: MEDICAID

## 2021-06-07 VITALS
HEIGHT: 69.02 IN | WEIGHT: 252.88 LBS | OXYGEN SATURATION: 91 % | RESPIRATION RATE: 18 BRPM | TEMPERATURE: 98 F | DIASTOLIC BLOOD PRESSURE: 78 MMHG | BODY MASS INDEX: 37.45 KG/M2 | SYSTOLIC BLOOD PRESSURE: 112 MMHG | HEART RATE: 76 BPM

## 2021-06-07 PROCEDURE — 93018 CV STRESS TEST I&R ONLY: CPT | Performed by: INTERNAL MEDICINE

## 2021-06-07 PROCEDURE — 93017 CV STRESS TEST TRACING ONLY: CPT | Performed by: INTERNAL MEDICINE

## 2021-06-07 PROCEDURE — 83735 ASSAY OF MAGNESIUM: CPT | Performed by: HOSPITALIST

## 2021-06-07 PROCEDURE — 93306 TTE W/DOPPLER COMPLETE: CPT | Performed by: INTERNAL MEDICINE

## 2021-06-07 PROCEDURE — 85379 FIBRIN DEGRADATION QUANT: CPT | Performed by: INTERNAL MEDICINE

## 2021-06-07 PROCEDURE — 78452 HT MUSCLE IMAGE SPECT MULT: CPT | Performed by: INTERNAL MEDICINE

## 2021-06-07 PROCEDURE — 80048 BASIC METABOLIC PNL TOTAL CA: CPT | Performed by: HOSPITALIST

## 2021-06-07 PROCEDURE — 84484 ASSAY OF TROPONIN QUANT: CPT | Performed by: HOSPITALIST

## 2021-06-07 RX ORDER — POTASSIUM CHLORIDE 20 MEQ/1
40 TABLET, EXTENDED RELEASE ORAL ONCE
Status: COMPLETED | OUTPATIENT
Start: 2021-06-07 | End: 2021-06-07

## 2021-06-07 RX ORDER — MAGNESIUM OXIDE 400 MG (241.3 MG MAGNESIUM) TABLET
400 TABLET ONCE
Status: COMPLETED | OUTPATIENT
Start: 2021-06-07 | End: 2021-06-07

## 2021-06-07 RX ORDER — BENAZEPRIL HYDROCHLORIDE 20 MG/1
20 TABLET ORAL DAILY
Qty: 30 TABLET | Refills: 1 | Status: SHIPPED | OUTPATIENT
Start: 2021-06-07 | End: 2021-06-22

## 2021-06-07 NOTE — PLAN OF CARE
Pt. Arrived to unit at 2200. Pleasant and cooperative with staff. Able to transfer from cart to bed with minimal assistance. Completed admission navigator. Oriented to room. Call light within reach pt. Able to use. Updated on plan of care.      PMH: depress stability  - Monitor arterial and/or venous puncture sites for bleeding and/or hematoma  - Assess quality of pulses, skin color and temperature  - Assess for signs of decreased coronary artery perfusion - ex.  Angina  - Evaluate fluid balance, assess for ed for mobility and gait  - Educate and engage patient/family in tolerated activity level and precautions  - Recommend use of  cane for transfers and ambulation  6/6/2021 2338 by Cande Villegas RN  Outcome: Progressing  6/6/2021 2335 by Madeleine Goode

## 2021-06-07 NOTE — H&P
[facility]    History and Physical     Juanna Star Patient Status:  Observation    1960 MRN [id number]   [facility] 8NE-A Attending Arlene Oh MD   Hosp Day # 0 PCP Mami Shen MD     Chief Complaint: Chest Elida Sandifer smokeless tobacco. She reports that she does not drink alcohol and does not use drugs. No illegal drugs, , 3 children, working customer service, use a cane at times, daughter lives with patient.      Family History:   Family History   Problem Relatio differently: Take 0.25 mg by mouth nightly as needed.  ), Disp: 30 tablet, Rfl: 0        Review of Systems:   A comprehensive 14 point review of systems was completed. Pertinent positives and negatives noted in the HPI.     Physical Exam:    /55 (B neg  -cardiology consulted  -asa  -echo  -stress test  -sx resolved as of now  -monitor clinically.     SOB  -cxr neg  -given LE heaviness and sob will check d-dimer and if pos check ct chest to r/p PE  -may also be cardiac in nature, work up as above  -sx

## 2021-06-07 NOTE — ED PROVIDER NOTES
Patient Seen in: [facility] Emergency Department      History   Patient presents with:  Chest Pain Angina    Stated Complaint: Chest Pain    HPI/Subjective:   HPI    78-year-old female history of hypertension, high cholesterol presents to ED with co Review of Systems    Positive for stated complaint: Chest Pain  Other systems are as noted in HPI. Constitutional and vital signs reviewed. All other systems reviewed and negative except as noted above.     Physical Exam     ED Triage Vitals [06 LIPID PANEL - Abnormal; Notable for the following components:    Cholesterol, Total 202 (*)     HDL Cholesterol 38 (*)     Triglycerides 460 (*)     VLDL 76 (*)     Non HDL Chol 164 (*)     All other components within normal limits   CBC W/ DIFFERENTIAL Mellitus  Cigarette smoking   Positive family history  Obesity (Body mass index is 37.32 kg/m².)    Troponin  +2  >3x upper limit or more  +1  1-3x upper limit  +0  Less than or equal to upper limit  =====================================  Total: 4    0-3:

## 2021-06-07 NOTE — PLAN OF CARE
Preliminary nuclear stress test results as called to me by radiology:    LVEF 73% and negative for perfusion abnormalities. Echo reviewed and stable. Ok to DC from cardiology perspective.      DENISE Tian

## 2021-06-07 NOTE — CONSULTS
[facility] Cardiology Consultation    Ap Smalls Patient Status:  Observation    1960 MRN [id number]   [facility] 8NE-A Attending Larisa Maria MD   Hosp Day # 0 PCP Patience Howe MD     Reason for Consultation:  Chest pain ER  40 mEq Oral Once   • magnesium oxide  400 mg Oral Once   • atorvastatin  20 mg Oral Daily   • busPIRone HCl  10 mg Oral TID   • gabapentin  600 mg Oral BID   • Pantoprazole Sodium  40 mg Oral QAM AC   • Venlafaxine HCl ER  225 mg Oral Daily   • traZODo Lab 06/06/21  1747 06/06/21  2224 06/07/21  0521   TROP <0.045 <0.045 <0.045         Impression:   1. Chest pain with dyspnea, nausea, and BL leg cramping. Trop's negative. Sx's resolved after a few hours. 2. HTN  3. Hypokalemia, replaced.   4. Obesity

## 2021-06-07 NOTE — PLAN OF CARE
Assumed care of pt at 0730  A&Ox4, up with standby assist, uses L ankle brace and cane with ambulation, no complaints of pain  R/A, NSR, no chest pain, no shortness of breath  Skin is clean, dry, and intact, no wounds present  Continent of bowel and bladde Evaluate effectiveness of antiarrhythmic and heart rate control medications as ordered  - Initiate emergency measures for life threatening arrhythmias  - Monitor electrolytes and administer replacement therapy as ordered  Outcome: Progressing     Problem: opioid side effects  - Notify MD/LIP if interventions unsuccessful or patient reports new pain  - Anticipate increased pain with activity and pre-medicate as appropriate  Outcome: Progressing

## 2021-06-07 NOTE — PROGRESS NOTES
CARDIODIAGNOSTIC PRELIMINARY REPORT:    Araceli Jones completed, tolerated well    Second set of images pending

## 2021-06-22 PROBLEM — G62.9 PERIPHERAL POLYNEUROPATHY: Status: ACTIVE | Noted: 2021-06-22

## 2021-06-22 PROBLEM — E66.01 SEVERE OBESITY (BMI 35.0-39.9) WITH COMORBIDITY (HCC): Status: ACTIVE | Noted: 2021-06-22

## 2021-06-22 PROBLEM — R60.0 PEDAL EDEMA: Status: ACTIVE | Noted: 2021-06-22

## 2021-06-22 PROBLEM — F41.9 ANXIETY: Status: ACTIVE | Noted: 2021-06-22

## 2021-06-22 PROBLEM — K76.0 FATTY LIVER DISEASE, NONALCOHOLIC: Status: ACTIVE | Noted: 2021-06-22

## 2021-06-22 PROBLEM — F51.04 CHRONIC INSOMNIA: Status: ACTIVE | Noted: 2021-06-22

## 2021-08-06 PROBLEM — F41.0 PANIC DISORDER: Status: ACTIVE | Noted: 2021-08-06

## 2021-09-14 PROBLEM — F33.1 MODERATE EPISODE OF RECURRENT MAJOR DEPRESSIVE DISORDER (HCC): Status: ACTIVE | Noted: 2021-09-14

## 2021-09-24 PROBLEM — M17.12 PRIMARY OSTEOARTHRITIS OF LEFT KNEE: Status: ACTIVE | Noted: 2021-09-24

## 2021-10-28 PROBLEM — E53.8 B12 DEFICIENCY: Status: ACTIVE | Noted: 2021-10-28

## 2021-11-15 ENCOUNTER — OFFICE VISIT (OUTPATIENT)
Dept: FAMILY MEDICINE CLINIC | Facility: CLINIC | Age: 61
End: 2021-11-15
Payer: MEDICAID

## 2021-11-15 VITALS
TEMPERATURE: 98 F | DIASTOLIC BLOOD PRESSURE: 68 MMHG | BODY MASS INDEX: 36.58 KG/M2 | SYSTOLIC BLOOD PRESSURE: 112 MMHG | HEART RATE: 82 BPM | HEIGHT: 69 IN | WEIGHT: 247 LBS | RESPIRATION RATE: 16 BRPM | OXYGEN SATURATION: 99 %

## 2021-11-15 DIAGNOSIS — J02.9 SORE THROAT: ICD-10-CM

## 2021-11-15 DIAGNOSIS — Z20.822 ENCOUNTER FOR LABORATORY TESTING FOR COVID-19 VIRUS: ICD-10-CM

## 2021-11-15 DIAGNOSIS — J02.0 STREP THROAT: Primary | ICD-10-CM

## 2021-11-15 PROCEDURE — 3074F SYST BP LT 130 MM HG: CPT | Performed by: NURSE PRACTITIONER

## 2021-11-15 PROCEDURE — 87880 STREP A ASSAY W/OPTIC: CPT | Performed by: NURSE PRACTITIONER

## 2021-11-15 PROCEDURE — 3078F DIAST BP <80 MM HG: CPT | Performed by: NURSE PRACTITIONER

## 2021-11-15 PROCEDURE — 99213 OFFICE O/P EST LOW 20 MIN: CPT | Performed by: NURSE PRACTITIONER

## 2021-11-15 PROCEDURE — 3008F BODY MASS INDEX DOCD: CPT | Performed by: NURSE PRACTITIONER

## 2021-11-15 RX ORDER — AMOXICILLIN 875 MG/1
875 TABLET, COATED ORAL 2 TIMES DAILY
Qty: 20 TABLET | Refills: 0 | Status: SHIPPED | OUTPATIENT
Start: 2021-11-15 | End: 2021-11-25

## 2021-11-15 NOTE — PROGRESS NOTES
CHIEF COMPLAINT:   Patient presents with:  Sore Throat: x 1 day      HPI:   Colton Carmona is a 59year old female presents to clinic with symptoms of sore throat. Patient has had for 1 days. Symptoms have been worsneing since onset.   Patient reports fo 500 MG Oral Tab Take 2 tablets (1,000 mg total) by mouth 3 (three) times daily as needed for Pain.  0      Past Medical History:   Diagnosis Date   • Back pain    • Depression    • High blood pressure    • High cholesterol    • Other and unspecified hyperl occipital lymphadenopathy.     Recent Results (from the past 24 hour(s))   STREP A ASSAY W/OPTIC    Collection Time: 11/15/21  2:11 PM   Result Value Ref Range    Strep Grp A Screen POS Negative    Control Line Present with a clear background (yes/no) Yes Y get dehydrated. · No work or school for the first 2 days of taking the antibiotics.  You can then return to school or work if you are feeling better, have been taking the antibiotic for at least 24 hours and don't have a fever.   · Take antibiotic medicine take to help prevent an infection:   · Wash your hands often with soap and clean, running water for at least 20 seconds. · Don’t have close contact with people who have sore throats, colds, or other upper respiratory infections.   · Don’t smoke, and stay a

## 2021-11-22 ENCOUNTER — OFFICE VISIT (OUTPATIENT)
Dept: FAMILY MEDICINE CLINIC | Facility: CLINIC | Age: 61
End: 2021-11-22
Payer: MEDICAID

## 2021-11-22 VITALS
BODY MASS INDEX: 36 KG/M2 | RESPIRATION RATE: 16 BRPM | TEMPERATURE: 97 F | HEART RATE: 91 BPM | WEIGHT: 247 LBS | OXYGEN SATURATION: 98 %

## 2021-11-22 DIAGNOSIS — J11.1 INFLUENZA-LIKE ILLNESS: Primary | ICD-10-CM

## 2021-11-22 PROCEDURE — 99213 OFFICE O/P EST LOW 20 MIN: CPT | Performed by: NURSE PRACTITIONER

## 2021-11-22 NOTE — PROGRESS NOTES
CHIEF COMPLAINT:   No chief complaint on file. HPI:   Sharonda Campo is a 59year old female who presents for upper respiratory symptoms for  5 days. Patient reports congestion, low grade fever, cough, bodyaches.  Symptoms have been chaning since on mg total) by mouth 3 (three) times daily as needed for Pain.  0      Past Medical History:   Diagnosis Date   • Back pain    • Depression    • High blood pressure    • High cholesterol    • Other and unspecified hyperlipidemia    • PONV (postoperative naus without murmur  EXTREMITIES: no cyanosis, clubbing or edema  LYMPH:  no lymphadenopathy.         ASSESSMENT AND PLAN:   Indio Collier is a 59year old female who presents with upper respiratory symptoms that are consistent with    ASSESSMENT:   Michael Morales have chronic liver or kidney disease, have ever had a stomach ulcer or gastrointestinal bleeding, or are taking blood-thinning medicines, talk with your healthcare provider before using these medicines.  Aspirin should never be given to anyone under 18 year

## 2022-03-29 ENCOUNTER — HOSPITAL ENCOUNTER (EMERGENCY)
Facility: HOSPITAL | Age: 62
Discharge: HOME | End: 2022-03-29
Attending: EMERGENCY MEDICINE
Payer: MEDICAID

## 2022-03-29 ENCOUNTER — APPOINTMENT (OUTPATIENT)
Dept: MRI IMAGING | Facility: HOSPITAL | Age: 62
End: 2022-03-29
Attending: EMERGENCY MEDICINE
Payer: MEDICAID

## 2022-03-29 ENCOUNTER — APPOINTMENT (OUTPATIENT)
Dept: GENERAL RADIOLOGY | Facility: HOSPITAL | Age: 62
End: 2022-03-29
Attending: EMERGENCY MEDICINE
Payer: MEDICAID

## 2022-03-29 VITALS
OXYGEN SATURATION: 90 % | BODY MASS INDEX: 32.58 KG/M2 | SYSTOLIC BLOOD PRESSURE: 117 MMHG | HEIGHT: 69 IN | HEART RATE: 83 BPM | RESPIRATION RATE: 17 BRPM | WEIGHT: 220 LBS | DIASTOLIC BLOOD PRESSURE: 76 MMHG

## 2022-03-29 DIAGNOSIS — R20.2 PARESTHESIAS: Primary | ICD-10-CM

## 2022-03-29 DIAGNOSIS — R07.89 CHEST PAIN, ATYPICAL: ICD-10-CM

## 2022-03-29 LAB
ALBUMIN SERPL-MCNC: 3.8 G/DL (ref 3.4–5)
ALBUMIN/GLOB SERPL: 1.1 {RATIO} (ref 1–2)
ALP LIVER SERPL-CCNC: 65 U/L
ALT SERPL-CCNC: 24 U/L
ANION GAP SERPL CALC-SCNC: 5 MMOL/L (ref 0–18)
AST SERPL-CCNC: 22 U/L (ref 15–37)
BASOPHILS # BLD AUTO: 0.06 X10(3) UL (ref 0–0.2)
BASOPHILS NFR BLD AUTO: 0.4 %
BILIRUB SERPL-MCNC: 0.4 MG/DL (ref 0.1–2)
BUN BLD-MCNC: 14 MG/DL (ref 7–18)
CALCIUM BLD-MCNC: 8.7 MG/DL (ref 8.5–10.1)
CHLORIDE SERPL-SCNC: 105 MMOL/L (ref 98–112)
CO2 SERPL-SCNC: 29 MMOL/L (ref 21–32)
CREAT BLD-MCNC: 0.88 MG/DL
EOSINOPHIL # BLD AUTO: 0.13 X10(3) UL (ref 0–0.7)
EOSINOPHIL NFR BLD AUTO: 0.9 %
ERYTHROCYTE [DISTWIDTH] IN BLOOD BY AUTOMATED COUNT: 13.8 %
GLOBULIN PLAS-MCNC: 3.5 G/DL (ref 2.8–4.4)
GLUCOSE BLD-MCNC: 90 MG/DL (ref 70–99)
HGB BLD-MCNC: 14.5 G/DL
IMM GRANULOCYTES # BLD AUTO: 0.06 X10(3) UL (ref 0–1)
IMM GRANULOCYTES NFR BLD: 0.4 %
LYMPHOCYTES # BLD AUTO: 3.48 X10(3) UL (ref 1–4)
LYMPHOCYTES NFR BLD AUTO: 24.9 %
MCH RBC QN AUTO: 30.1 PG (ref 26–34)
MCHC RBC AUTO-ENTMCNC: 34.6 G/DL (ref 31–37)
MCV RBC AUTO: 86.9 FL
MONOCYTES # BLD AUTO: 0.92 X10(3) UL (ref 0.1–1)
MONOCYTES NFR BLD AUTO: 6.6 %
NEUTROPHILS # BLD AUTO: 9.33 X10(3) UL (ref 1.5–7.7)
NEUTROPHILS NFR BLD AUTO: 66.8 %
OSMOLALITY SERPL CALC.SUM OF ELEC: 288 MOSM/KG (ref 275–295)
PLATELET # BLD AUTO: 322 10(3)UL (ref 150–450)
POTASSIUM SERPL-SCNC: 2.9 MMOL/L (ref 3.5–5.1)
PROT SERPL-MCNC: 7.3 G/DL (ref 6.4–8.2)
RBC # BLD AUTO: 4.82 X10(6)UL
SARS-COV-2 RNA RESP QL NAA+PROBE: NOT DETECTED
SODIUM SERPL-SCNC: 139 MMOL/L (ref 136–145)
TROPONIN I HIGH SENSITIVITY: 3 NG/L
TROPONIN I HIGH SENSITIVITY: 4 NG/L
WBC # BLD AUTO: 14 X10(3) UL (ref 4–11)

## 2022-03-29 PROCEDURE — 36415 COLL VENOUS BLD VENIPUNCTURE: CPT

## 2022-03-29 PROCEDURE — 80053 COMPREHEN METABOLIC PANEL: CPT | Performed by: EMERGENCY MEDICINE

## 2022-03-29 PROCEDURE — 93005 ELECTROCARDIOGRAM TRACING: CPT

## 2022-03-29 PROCEDURE — 85025 COMPLETE CBC W/AUTO DIFF WBC: CPT | Performed by: EMERGENCY MEDICINE

## 2022-03-29 PROCEDURE — 99285 EMERGENCY DEPT VISIT HI MDM: CPT

## 2022-03-29 PROCEDURE — 84484 ASSAY OF TROPONIN QUANT: CPT | Performed by: EMERGENCY MEDICINE

## 2022-03-29 PROCEDURE — 70553 MRI BRAIN STEM W/O & W/DYE: CPT | Performed by: EMERGENCY MEDICINE

## 2022-03-29 PROCEDURE — 93010 ELECTROCARDIOGRAM REPORT: CPT

## 2022-03-29 PROCEDURE — 71045 X-RAY EXAM CHEST 1 VIEW: CPT | Performed by: EMERGENCY MEDICINE

## 2022-03-29 PROCEDURE — A9575 INJ GADOTERATE MEGLUMI 0.1ML: HCPCS | Performed by: EMERGENCY MEDICINE

## 2022-03-29 RX ORDER — POTASSIUM CHLORIDE 20 MEQ/1
40 TABLET, EXTENDED RELEASE ORAL ONCE
Status: COMPLETED | OUTPATIENT
Start: 2022-03-29 | End: 2022-03-29

## 2022-03-29 RX ORDER — KETOROLAC TROMETHAMINE 30 MG/ML
15 INJECTION, SOLUTION INTRAMUSCULAR; INTRAVENOUS ONCE
Status: DISCONTINUED | OUTPATIENT
Start: 2022-03-29 | End: 2022-03-29

## 2022-03-29 NOTE — ED INITIAL ASSESSMENT (HPI)
Patient reports with tingling to bilateral hand and tingling to face, this started at 1045 this morning.   Upon arrival to ED patient c/o chest heaviness

## 2022-03-31 LAB
ATRIAL RATE: 102 BPM
P AXIS: 27 DEGREES
P-R INTERVAL: 156 MS
Q-T INTERVAL: 346 MS
QRS DURATION: 62 MS
QTC CALCULATION (BEZET): 450 MS
R AXIS: -19 DEGREES
VENTRICULAR RATE: 102 BPM

## 2022-04-15 ENCOUNTER — LABORATORY ENCOUNTER (OUTPATIENT)
Dept: LAB | Facility: HOSPITAL | Age: 62
End: 2022-04-15
Attending: ORTHOPAEDIC SURGERY
Payer: MEDICAID

## 2022-04-15 DIAGNOSIS — M17.11 PRIMARY OSTEOARTHRITIS OF RIGHT KNEE: ICD-10-CM

## 2022-04-15 LAB
ANTIBODY SCREEN: NEGATIVE
RH BLOOD TYPE: NEGATIVE

## 2022-04-15 PROCEDURE — 86901 BLOOD TYPING SEROLOGIC RH(D): CPT

## 2022-04-15 PROCEDURE — 86900 BLOOD TYPING SEROLOGIC ABO: CPT

## 2022-04-15 PROCEDURE — 87081 CULTURE SCREEN ONLY: CPT

## 2022-04-15 PROCEDURE — 86850 RBC ANTIBODY SCREEN: CPT

## 2022-04-15 RX ORDER — MELATONIN
1000 DAILY
COMMUNITY

## 2022-04-15 RX ORDER — MAGNESIUM 30 MG
30 TABLET ORAL DAILY
COMMUNITY

## 2022-04-15 RX ORDER — AMOXICILLIN AND CLAVULANATE POTASSIUM 875; 125 MG/1; MG/1
1 TABLET, FILM COATED ORAL 2 TIMES DAILY
COMMUNITY
End: 2022-05-06

## 2022-04-15 RX ORDER — ARIPIPRAZOLE 5 MG/1
5 TABLET ORAL DAILY
COMMUNITY

## 2022-04-18 ENCOUNTER — ANESTHESIA EVENT (OUTPATIENT)
Dept: SURGERY | Facility: HOSPITAL | Age: 62
End: 2022-04-18
Payer: MEDICAID

## 2022-05-02 ENCOUNTER — LAB ENCOUNTER (OUTPATIENT)
Dept: LAB | Facility: HOSPITAL | Age: 62
End: 2022-05-02
Attending: ORTHOPAEDIC SURGERY
Payer: MEDICAID

## 2022-05-02 DIAGNOSIS — Z01.812 ENCOUNTER FOR PREOPERATIVE SCREENING LABORATORY TESTING FOR COVID-19 VIRUS: ICD-10-CM

## 2022-05-02 DIAGNOSIS — Z20.822 ENCOUNTER FOR PREOPERATIVE SCREENING LABORATORY TESTING FOR COVID-19 VIRUS: ICD-10-CM

## 2022-05-02 DIAGNOSIS — M17.11 PRIMARY OSTEOARTHRITIS OF RIGHT KNEE: ICD-10-CM

## 2022-05-02 LAB — SARS-COV-2 RNA RESP QL NAA+PROBE: NOT DETECTED

## 2022-05-05 ENCOUNTER — HOSPITAL ENCOUNTER (OUTPATIENT)
Facility: HOSPITAL | Age: 62
Discharge: HOME | End: 2022-05-06
Attending: ORTHOPAEDIC SURGERY | Admitting: ORTHOPAEDIC SURGERY
Payer: MEDICAID

## 2022-05-05 ENCOUNTER — APPOINTMENT (OUTPATIENT)
Dept: GENERAL RADIOLOGY | Facility: HOSPITAL | Age: 62
End: 2022-05-05
Attending: PHYSICIAN ASSISTANT
Payer: MEDICAID

## 2022-05-05 ENCOUNTER — ANESTHESIA (OUTPATIENT)
Dept: SURGERY | Facility: HOSPITAL | Age: 62
End: 2022-05-05
Payer: MEDICAID

## 2022-05-05 DIAGNOSIS — Z01.812 ENCOUNTER FOR PREOPERATIVE SCREENING LABORATORY TESTING FOR COVID-19 VIRUS: ICD-10-CM

## 2022-05-05 DIAGNOSIS — Z20.822 ENCOUNTER FOR PREOPERATIVE SCREENING LABORATORY TESTING FOR COVID-19 VIRUS: ICD-10-CM

## 2022-05-05 DIAGNOSIS — M17.11 PRIMARY OSTEOARTHRITIS OF RIGHT KNEE: Primary | ICD-10-CM

## 2022-05-05 LAB
GLUCOSE BLD-MCNC: 109 MG/DL (ref 70–99)
GLUCOSE BLD-MCNC: 202 MG/DL (ref 70–99)
GLUCOSE BLD-MCNC: 88 MG/DL (ref 70–99)
HCT VFR BLD AUTO: 37 %
HGB BLD-MCNC: 12 G/DL

## 2022-05-05 PROCEDURE — 82962 GLUCOSE BLOOD TEST: CPT

## 2022-05-05 PROCEDURE — 73560 X-RAY EXAM OF KNEE 1 OR 2: CPT | Performed by: PHYSICIAN ASSISTANT

## 2022-05-05 PROCEDURE — 85018 HEMOGLOBIN: CPT | Performed by: PHYSICIAN ASSISTANT

## 2022-05-05 PROCEDURE — 76942 ECHO GUIDE FOR BIOPSY: CPT | Performed by: ANESTHESIOLOGY

## 2022-05-05 PROCEDURE — 0SRC0J9 REPLACEMENT OF RIGHT KNEE JOINT WITH SYNTHETIC SUBSTITUTE, CEMENTED, OPEN APPROACH: ICD-10-PCS | Performed by: ORTHOPAEDIC SURGERY

## 2022-05-05 PROCEDURE — 85014 HEMATOCRIT: CPT | Performed by: PHYSICIAN ASSISTANT

## 2022-05-05 DEVICE — ATTUNE KNEE SYSTEM TIBIAL BASE ROTATING PLATFORM SIZE 5 CEMENTED
Type: IMPLANTABLE DEVICE | Site: KNEE | Status: FUNCTIONAL
Brand: ATTUNE

## 2022-05-05 DEVICE — ATTUNE KNEE SYSTEM FEMORAL POSTERIOR STABILIZED SIZE 6 RIGHT CEMENTED
Type: IMPLANTABLE DEVICE | Site: KNEE | Status: FUNCTIONAL
Brand: ATTUNE

## 2022-05-05 DEVICE — ATTUNE PATELLA MEDIALIZED ANATOMIC 32MM CEMENTED AOX
Type: IMPLANTABLE DEVICE | Site: KNEE | Status: FUNCTIONAL
Brand: ATTUNE

## 2022-05-05 DEVICE — SMARTSET GHV GENTAMICIN HIGH VISCOSITY BONE CEMENT 40G
Type: IMPLANTABLE DEVICE | Site: KNEE | Status: FUNCTIONAL
Brand: SMARTSET

## 2022-05-05 DEVICE — ATTUNE KNEE SYSTEM TIBIAL INSERT ROTATING PLATFORM POSTERIOR STABILIZED 6 5MM AOX
Type: IMPLANTABLE DEVICE | Site: KNEE | Status: FUNCTIONAL
Brand: ATTUNE

## 2022-05-05 RX ORDER — HYDROCODONE BITARTRATE AND ACETAMINOPHEN 5; 325 MG/1; MG/1
1 TABLET ORAL EVERY 4 HOURS PRN
Status: DISCONTINUED | OUTPATIENT
Start: 2022-05-05 | End: 2022-05-06

## 2022-05-05 RX ORDER — DEXTROSE MONOHYDRATE 25 G/50ML
50 INJECTION, SOLUTION INTRAVENOUS
Status: DISCONTINUED | OUTPATIENT
Start: 2022-05-05 | End: 2022-05-05 | Stop reason: HOSPADM

## 2022-05-05 RX ORDER — PREGABALIN 50 MG/1
50 CAPSULE ORAL 3 TIMES DAILY
Status: DISCONTINUED | OUTPATIENT
Start: 2022-05-05 | End: 2022-05-06

## 2022-05-05 RX ORDER — FOLIC ACID 1 MG/1
1 TABLET ORAL DAILY
Status: DISCONTINUED | OUTPATIENT
Start: 2022-05-05 | End: 2022-05-06

## 2022-05-05 RX ORDER — DEXAMETHASONE SODIUM PHOSPHATE 10 MG/ML
INJECTION, SOLUTION INTRAMUSCULAR; INTRAVENOUS AS NEEDED
Status: DISCONTINUED | OUTPATIENT
Start: 2022-05-05 | End: 2022-05-05 | Stop reason: SURG

## 2022-05-05 RX ORDER — NICOTINE POLACRILEX 4 MG
30 LOZENGE BUCCAL
Status: DISCONTINUED | OUTPATIENT
Start: 2022-05-05 | End: 2022-05-05 | Stop reason: HOSPADM

## 2022-05-05 RX ORDER — DIPHENHYDRAMINE HYDROCHLORIDE 50 MG/ML
25 INJECTION INTRAMUSCULAR; INTRAVENOUS ONCE AS NEEDED
Status: ACTIVE | OUTPATIENT
Start: 2022-05-05 | End: 2022-05-05

## 2022-05-05 RX ORDER — ACETAMINOPHEN 10 MG/ML
1000 INJECTION, SOLUTION INTRAVENOUS ONCE
Status: COMPLETED | OUTPATIENT
Start: 2022-05-05 | End: 2022-05-05

## 2022-05-05 RX ORDER — HYDROMORPHONE HYDROCHLORIDE 1 MG/ML
0.2 INJECTION, SOLUTION INTRAMUSCULAR; INTRAVENOUS; SUBCUTANEOUS EVERY 5 MIN PRN
Status: DISCONTINUED | OUTPATIENT
Start: 2022-05-05 | End: 2022-05-05 | Stop reason: HOSPADM

## 2022-05-05 RX ORDER — CEFAZOLIN SODIUM/WATER 2 G/20 ML
SYRINGE (ML) INTRAVENOUS
Status: DISPENSED
Start: 2022-05-05 | End: 2022-05-05

## 2022-05-05 RX ORDER — HYDROMORPHONE HYDROCHLORIDE 1 MG/ML
0.2 INJECTION, SOLUTION INTRAMUSCULAR; INTRAVENOUS; SUBCUTANEOUS EVERY 2 HOUR PRN
Status: DISCONTINUED | OUTPATIENT
Start: 2022-05-05 | End: 2022-05-06

## 2022-05-05 RX ORDER — MULTIVIT-MIN/IRON/FOLIC ACID/K 18-600-40
CAPSULE ORAL DAILY
COMMUNITY

## 2022-05-05 RX ORDER — DOCUSATE SODIUM 100 MG/1
100 CAPSULE, LIQUID FILLED ORAL 2 TIMES DAILY
Status: DISCONTINUED | OUTPATIENT
Start: 2022-05-05 | End: 2022-05-06

## 2022-05-05 RX ORDER — HYDROMORPHONE HYDROCHLORIDE 1 MG/ML
0.4 INJECTION, SOLUTION INTRAMUSCULAR; INTRAVENOUS; SUBCUTANEOUS EVERY 5 MIN PRN
Status: DISCONTINUED | OUTPATIENT
Start: 2022-05-05 | End: 2022-05-05 | Stop reason: HOSPADM

## 2022-05-05 RX ORDER — HYDROCODONE BITARTRATE AND ACETAMINOPHEN 10; 325 MG/1; MG/1
1-2 TABLET ORAL EVERY 4 HOURS PRN
COMMUNITY
Start: 2022-04-26 | End: 2022-05-06

## 2022-05-05 RX ORDER — MIDAZOLAM HYDROCHLORIDE 1 MG/ML
INJECTION INTRAMUSCULAR; INTRAVENOUS
Status: COMPLETED
Start: 2022-05-05 | End: 2022-05-05

## 2022-05-05 RX ORDER — ZOLPIDEM TARTRATE 5 MG/1
5 TABLET ORAL NIGHTLY PRN
Status: DISCONTINUED | OUTPATIENT
Start: 2022-05-05 | End: 2022-05-06

## 2022-05-05 RX ORDER — CEFAZOLIN SODIUM/WATER 2 G/20 ML
2 SYRINGE (ML) INTRAVENOUS EVERY 8 HOURS
Status: COMPLETED | OUTPATIENT
Start: 2022-05-05 | End: 2022-05-06

## 2022-05-05 RX ORDER — NALOXONE HYDROCHLORIDE 0.4 MG/ML
80 INJECTION, SOLUTION INTRAMUSCULAR; INTRAVENOUS; SUBCUTANEOUS AS NEEDED
Status: DISCONTINUED | OUTPATIENT
Start: 2022-05-05 | End: 2022-05-05 | Stop reason: HOSPADM

## 2022-05-05 RX ORDER — SODIUM PHOSPHATE, DIBASIC AND SODIUM PHOSPHATE, MONOBASIC 7; 19 G/133ML; G/133ML
1 ENEMA RECTAL ONCE AS NEEDED
Status: DISCONTINUED | OUTPATIENT
Start: 2022-05-05 | End: 2022-05-06

## 2022-05-05 RX ORDER — PROCHLORPERAZINE EDISYLATE 5 MG/ML
10 INJECTION INTRAMUSCULAR; INTRAVENOUS EVERY 6 HOURS PRN
Status: DISCONTINUED | OUTPATIENT
Start: 2022-05-05 | End: 2022-05-06

## 2022-05-05 RX ORDER — HYDROMORPHONE HYDROCHLORIDE 1 MG/ML
0.6 INJECTION, SOLUTION INTRAMUSCULAR; INTRAVENOUS; SUBCUTANEOUS EVERY 5 MIN PRN
Status: DISCONTINUED | OUTPATIENT
Start: 2022-05-05 | End: 2022-05-05 | Stop reason: HOSPADM

## 2022-05-05 RX ORDER — ACETAMINOPHEN 325 MG/1
TABLET ORAL
Status: DISCONTINUED
Start: 2022-05-05 | End: 2022-05-06

## 2022-05-05 RX ORDER — FOLIC ACID 1 MG/1
TABLET ORAL DAILY
COMMUNITY

## 2022-05-05 RX ORDER — VENLAFAXINE HYDROCHLORIDE 75 MG/1
225 CAPSULE, EXTENDED RELEASE ORAL DAILY
Status: DISCONTINUED | OUTPATIENT
Start: 2022-05-05 | End: 2022-05-06

## 2022-05-05 RX ORDER — DEXAMETHASONE SODIUM PHOSPHATE 4 MG/ML
VIAL (ML) INJECTION AS NEEDED
Status: DISCONTINUED | OUTPATIENT
Start: 2022-05-05 | End: 2022-05-05 | Stop reason: SURG

## 2022-05-05 RX ORDER — ACETAMINOPHEN 10 MG/ML
INJECTION, SOLUTION INTRAVENOUS
Status: COMPLETED
Start: 2022-05-05 | End: 2022-05-05

## 2022-05-05 RX ORDER — ASPIRIN 325 MG
325 TABLET ORAL 2 TIMES DAILY
Status: DISCONTINUED | OUTPATIENT
Start: 2022-05-05 | End: 2022-05-06

## 2022-05-05 RX ORDER — TOPIRAMATE 25 MG/1
25 TABLET ORAL 2 TIMES DAILY
Status: DISCONTINUED | OUTPATIENT
Start: 2022-05-05 | End: 2022-05-05

## 2022-05-05 RX ORDER — ACETAMINOPHEN 500 MG
1000 TABLET ORAL 4 TIMES DAILY
Status: DISCONTINUED | OUTPATIENT
Start: 2022-05-05 | End: 2022-05-06

## 2022-05-05 RX ORDER — DIPHENHYDRAMINE HYDROCHLORIDE 50 MG/ML
12.5 INJECTION INTRAMUSCULAR; INTRAVENOUS EVERY 4 HOURS PRN
Status: DISCONTINUED | OUTPATIENT
Start: 2022-05-05 | End: 2022-05-06

## 2022-05-05 RX ORDER — NICOTINE POLACRILEX 4 MG
15 LOZENGE BUCCAL
Status: DISCONTINUED | OUTPATIENT
Start: 2022-05-05 | End: 2022-05-05 | Stop reason: HOSPADM

## 2022-05-05 RX ORDER — ALPRAZOLAM 0.5 MG/1
0.5 TABLET ORAL DAILY PRN
Status: DISCONTINUED | OUTPATIENT
Start: 2022-05-05 | End: 2022-05-06

## 2022-05-05 RX ORDER — HYDROMORPHONE HYDROCHLORIDE 1 MG/ML
INJECTION, SOLUTION INTRAMUSCULAR; INTRAVENOUS; SUBCUTANEOUS
Status: COMPLETED
Start: 2022-05-05 | End: 2022-05-05

## 2022-05-05 RX ORDER — CELECOXIB 200 MG/1
200 CAPSULE ORAL DAILY
COMMUNITY
Start: 2022-04-26 | End: 2022-05-26

## 2022-05-05 RX ORDER — SENNOSIDES 8.6 MG
17.2 TABLET ORAL NIGHTLY
Status: DISCONTINUED | OUTPATIENT
Start: 2022-05-05 | End: 2022-05-06

## 2022-05-05 RX ORDER — ONDANSETRON 2 MG/ML
INJECTION INTRAMUSCULAR; INTRAVENOUS AS NEEDED
Status: DISCONTINUED | OUTPATIENT
Start: 2022-05-05 | End: 2022-05-05 | Stop reason: SURG

## 2022-05-05 RX ORDER — TRANEXAMIC ACID 10 MG/ML
1000 INJECTION, SOLUTION INTRAVENOUS ONCE
Status: COMPLETED | OUTPATIENT
Start: 2022-05-05 | End: 2022-05-05

## 2022-05-05 RX ORDER — LISINOPRIL 20 MG/1
20 TABLET ORAL DAILY
Refills: 1 | Status: DISCONTINUED | OUTPATIENT
Start: 2022-05-05 | End: 2022-05-06

## 2022-05-05 RX ORDER — DEXAMETHASONE SODIUM PHOSPHATE 10 MG/ML
8 INJECTION, SOLUTION INTRAMUSCULAR; INTRAVENOUS ONCE
Status: COMPLETED | OUTPATIENT
Start: 2022-05-06 | End: 2022-05-06

## 2022-05-05 RX ORDER — FERROUS SULFATE 325(65) MG
325 TABLET ORAL DAILY
Status: DISCONTINUED | OUTPATIENT
Start: 2022-05-05 | End: 2022-05-05

## 2022-05-05 RX ORDER — ONDANSETRON 2 MG/ML
4 INJECTION INTRAMUSCULAR; INTRAVENOUS EVERY 4 HOURS PRN
Status: DISCONTINUED | OUTPATIENT
Start: 2022-05-05 | End: 2022-05-06

## 2022-05-05 RX ORDER — POLYETHYLENE GLYCOL 3350 17 G/17G
17 POWDER, FOR SOLUTION ORAL DAILY PRN
Status: DISCONTINUED | OUTPATIENT
Start: 2022-05-05 | End: 2022-05-06

## 2022-05-05 RX ORDER — SODIUM CHLORIDE, SODIUM LACTATE, POTASSIUM CHLORIDE, CALCIUM CHLORIDE 600; 310; 30; 20 MG/100ML; MG/100ML; MG/100ML; MG/100ML
INJECTION, SOLUTION INTRAVENOUS CONTINUOUS
Status: DISCONTINUED | OUTPATIENT
Start: 2022-05-05 | End: 2022-05-05 | Stop reason: HOSPADM

## 2022-05-05 RX ORDER — ATORVASTATIN CALCIUM 20 MG/1
20 TABLET, FILM COATED ORAL DAILY
Status: DISCONTINUED | OUTPATIENT
Start: 2022-05-05 | End: 2022-05-06

## 2022-05-05 RX ORDER — SODIUM CHLORIDE 9 MG/ML
INJECTION, SOLUTION INTRAVENOUS CONTINUOUS
Status: DISCONTINUED | OUTPATIENT
Start: 2022-05-05 | End: 2022-05-06

## 2022-05-05 RX ORDER — MIDAZOLAM HYDROCHLORIDE 1 MG/ML
1 INJECTION INTRAMUSCULAR; INTRAVENOUS EVERY 5 MIN PRN
Status: DISCONTINUED | OUTPATIENT
Start: 2022-05-05 | End: 2022-05-05 | Stop reason: HOSPADM

## 2022-05-05 RX ORDER — KETAMINE HYDROCHLORIDE 50 MG/ML
INJECTION, SOLUTION, CONCENTRATE INTRAMUSCULAR; INTRAVENOUS AS NEEDED
Status: DISCONTINUED | OUTPATIENT
Start: 2022-05-05 | End: 2022-05-05 | Stop reason: SURG

## 2022-05-05 RX ORDER — KETOROLAC TROMETHAMINE 15 MG/ML
15 INJECTION, SOLUTION INTRAMUSCULAR; INTRAVENOUS EVERY 6 HOURS
Status: DISCONTINUED | OUTPATIENT
Start: 2022-05-05 | End: 2022-05-06

## 2022-05-05 RX ORDER — ARIPIPRAZOLE 5 MG/1
5 TABLET ORAL DAILY
Status: DISCONTINUED | OUTPATIENT
Start: 2022-05-05 | End: 2022-05-06

## 2022-05-05 RX ORDER — PANTOPRAZOLE SODIUM 40 MG/1
40 TABLET, DELAYED RELEASE ORAL
Refills: 3 | Status: DISCONTINUED | OUTPATIENT
Start: 2022-05-06 | End: 2022-05-06

## 2022-05-05 RX ORDER — MELATONIN
1000 DAILY
Status: DISCONTINUED | OUTPATIENT
Start: 2022-05-05 | End: 2022-05-06

## 2022-05-05 RX ORDER — KETOROLAC TROMETHAMINE 30 MG/ML
INJECTION, SOLUTION INTRAMUSCULAR; INTRAVENOUS AS NEEDED
Status: DISCONTINUED | OUTPATIENT
Start: 2022-05-05 | End: 2022-05-05 | Stop reason: SURG

## 2022-05-05 RX ORDER — BUPRENORPHINE HYDROCHLORIDE 0.32 MG/ML
INJECTION INTRAMUSCULAR; INTRAVENOUS AS NEEDED
Status: DISCONTINUED | OUTPATIENT
Start: 2022-05-05 | End: 2022-05-05 | Stop reason: SURG

## 2022-05-05 RX ORDER — FERROUS SULFATE 325(65) MG
325 TABLET ORAL DAILY
COMMUNITY
Start: 2022-04-26

## 2022-05-05 RX ORDER — TRAMADOL HYDROCHLORIDE 50 MG/1
50 TABLET ORAL EVERY 6 HOURS
Status: DISCONTINUED | OUTPATIENT
Start: 2022-05-05 | End: 2022-05-06

## 2022-05-05 RX ORDER — MELATONIN
325
Status: DISCONTINUED | OUTPATIENT
Start: 2022-05-06 | End: 2022-05-06

## 2022-05-05 RX ORDER — HYDROCODONE BITARTRATE AND ACETAMINOPHEN 10; 325 MG/1; MG/1
1-2 TABLET ORAL EVERY 4 HOURS PRN
COMMUNITY
Start: 2022-04-26

## 2022-05-05 RX ORDER — SCOLOPAMINE TRANSDERMAL SYSTEM 1 MG/1
1 PATCH, EXTENDED RELEASE TRANSDERMAL ONCE
Status: DISCONTINUED | OUTPATIENT
Start: 2022-05-05 | End: 2022-05-06

## 2022-05-05 RX ORDER — ACETAMINOPHEN 325 MG/1
650 TABLET ORAL ONCE
Status: DISCONTINUED | OUTPATIENT
Start: 2022-05-05 | End: 2022-05-05 | Stop reason: HOSPADM

## 2022-05-05 RX ORDER — SODIUM CHLORIDE, SODIUM LACTATE, POTASSIUM CHLORIDE, CALCIUM CHLORIDE 600; 310; 30; 20 MG/100ML; MG/100ML; MG/100ML; MG/100ML
INJECTION, SOLUTION INTRAVENOUS CONTINUOUS
Status: DISCONTINUED | OUTPATIENT
Start: 2022-05-05 | End: 2022-05-06

## 2022-05-05 RX ORDER — MIDAZOLAM HYDROCHLORIDE 1 MG/ML
INJECTION INTRAMUSCULAR; INTRAVENOUS AS NEEDED
Status: DISCONTINUED | OUTPATIENT
Start: 2022-05-05 | End: 2022-05-05 | Stop reason: SURG

## 2022-05-05 RX ORDER — BUSPIRONE HYDROCHLORIDE 5 MG/1
20 TABLET ORAL 3 TIMES DAILY
Status: DISCONTINUED | OUTPATIENT
Start: 2022-05-05 | End: 2022-05-06

## 2022-05-05 RX ORDER — ACETAMINOPHEN 500 MG
1000 TABLET ORAL ONCE
Status: DISCONTINUED | OUTPATIENT
Start: 2022-05-05 | End: 2022-05-05 | Stop reason: HOSPADM

## 2022-05-05 RX ORDER — TIZANIDINE 2 MG/1
2 TABLET ORAL EVERY 6 HOURS PRN
Status: DISCONTINUED | OUTPATIENT
Start: 2022-05-05 | End: 2022-05-06

## 2022-05-05 RX ORDER — HYDROMORPHONE HYDROCHLORIDE 1 MG/ML
0.4 INJECTION, SOLUTION INTRAMUSCULAR; INTRAVENOUS; SUBCUTANEOUS EVERY 2 HOUR PRN
Status: DISCONTINUED | OUTPATIENT
Start: 2022-05-05 | End: 2022-05-06

## 2022-05-05 RX ORDER — CEFAZOLIN SODIUM/WATER 2 G/20 ML
2 SYRINGE (ML) INTRAVENOUS ONCE
Status: COMPLETED | OUTPATIENT
Start: 2022-05-05 | End: 2022-05-05

## 2022-05-05 RX ORDER — PSEUDOEPHEDRINE HCL 30 MG
100 TABLET ORAL 2 TIMES DAILY
COMMUNITY
Start: 2022-04-26

## 2022-05-05 RX ORDER — TRIAMTERENE AND HYDROCHLOROTHIAZIDE 37.5; 25 MG/1; MG/1
1 CAPSULE ORAL EVERY MORNING
Status: DISCONTINUED | OUTPATIENT
Start: 2022-05-06 | End: 2022-05-06

## 2022-05-05 RX ORDER — BISACODYL 10 MG
10 SUPPOSITORY, RECTAL RECTAL
Status: DISCONTINUED | OUTPATIENT
Start: 2022-05-05 | End: 2022-05-06

## 2022-05-05 RX ORDER — HYDROCODONE BITARTRATE AND ACETAMINOPHEN 5; 325 MG/1; MG/1
2 TABLET ORAL EVERY 4 HOURS PRN
Status: DISCONTINUED | OUTPATIENT
Start: 2022-05-05 | End: 2022-05-06

## 2022-05-05 RX ORDER — DIPHENHYDRAMINE HCL 25 MG
25 CAPSULE ORAL EVERY 4 HOURS PRN
Status: DISCONTINUED | OUTPATIENT
Start: 2022-05-05 | End: 2022-05-06

## 2022-05-05 RX ADMIN — SODIUM CHLORIDE, SODIUM LACTATE, POTASSIUM CHLORIDE, CALCIUM CHLORIDE: 600; 310; 30; 20 INJECTION, SOLUTION INTRAVENOUS at 14:50:00

## 2022-05-05 RX ADMIN — DEXAMETHASONE SODIUM PHOSPHATE 2 MG: 10 INJECTION, SOLUTION INTRAMUSCULAR; INTRAVENOUS at 14:10:00

## 2022-05-05 RX ADMIN — TRANEXAMIC ACID 1000 MG: 10 INJECTION, SOLUTION INTRAVENOUS at 14:14:00

## 2022-05-05 RX ADMIN — KETAMINE HYDROCHLORIDE 10 MG: 50 INJECTION, SOLUTION, CONCENTRATE INTRAMUSCULAR; INTRAVENOUS at 14:02:00

## 2022-05-05 RX ADMIN — DEXAMETHASONE SODIUM PHOSPHATE 4 MG: 4 MG/ML VIAL (ML) INJECTION at 14:13:00

## 2022-05-05 RX ADMIN — KETOROLAC TROMETHAMINE 15 MG: 30 INJECTION, SOLUTION INTRAMUSCULAR; INTRAVENOUS at 15:32:00

## 2022-05-05 RX ADMIN — CEFAZOLIN SODIUM/WATER 2 G: 2 G/20 ML SYRINGE (ML) INTRAVENOUS at 14:14:00

## 2022-05-05 RX ADMIN — BUPRENORPHINE HYDROCHLORIDE 150 MCG: 0.32 INJECTION INTRAMUSCULAR; INTRAVENOUS at 14:10:00

## 2022-05-05 RX ADMIN — ONDANSETRON 4 MG: 2 INJECTION INTRAMUSCULAR; INTRAVENOUS at 14:13:00

## 2022-05-05 RX ADMIN — KETAMINE HYDROCHLORIDE 20 MG: 50 INJECTION, SOLUTION, CONCENTRATE INTRAMUSCULAR; INTRAVENOUS at 14:38:00

## 2022-05-05 RX ADMIN — MIDAZOLAM HYDROCHLORIDE 2 MG: 1 INJECTION INTRAMUSCULAR; INTRAVENOUS at 14:02:00

## 2022-05-05 RX ADMIN — SODIUM CHLORIDE, SODIUM LACTATE, POTASSIUM CHLORIDE, CALCIUM CHLORIDE: 600; 310; 30; 20 INJECTION, SOLUTION INTRAVENOUS at 15:43:00

## 2022-05-05 NOTE — BRIEF OP NOTE
Pre-Operative Diagnosis: Primary osteoarthritis of right knee [M17.11]     Post-Operative Diagnosis: Primary osteoarthritis of right knee [M17.11]      Procedure Performed:   RIGHT TOTAL KNEE ARTHROPLASTY    Surgeon(s) and Role:     * José Manuel Burris MD - Primary    Assistant(s):  Surgical Assistant.: Marianne Mahmood  PA: Laura Lucero PA-C     Surgical Findings: OA     Specimen: bone     Estimated Blood Loss: Blood Output: 20 mL (5/5/2022  3:26 PM)      Dictation Number:  Annetta Benavidez MD  5/5/2022  3:46 PM

## 2022-05-05 NOTE — OPERATIVE REPORT
PATIENT'S NAME: Santi Batista   ATTENDING PHYSICIAN: Ledy Grimes MD   OPERATING PHYSICIAN: Ledy Grimes MD   PATIENT ACCOUNT#:   [de-identified]    LOCATION:  [address] [place] [address],[address] RECORD #:   [id number]    YOB: 1960  ADMISSION DATE:  5/5/2022           OPERATION DATE:  5/5/2022     OPERATIVE REPORT     PREOPERATIVE DIAGNOSIS:  Right knee primary osteoarthritis. POSTOPERATIVE DIAGNOSIS: Right knee primary osteoarthritis. PROCEDURE PERFORMED:  Right total knee arthroplasty, kinematic alignment, Attune 6 cemented femur, cemented size 5 tibia, 6 mm rotating polyethylene, and cemented size 32 all-polyethylene patella. ASSISTANT:  BRANDEN Maharaj     ANESTHESIA:  Spinal with regional block. INDICATIONS:  This is a 59 yrs old Sheri female who presents with ongoing pain and difficulties with the knee consistent with the above diagnosis with failed conservative care. I reviewed the indications and benefits of the procedure. The patient understood and agreed to proceed. PROCEDURE:  The patient was taken to the operating room and placed in the supine position after administration of spinal anesthesia. A regional block was performed at the lower extremity. A tourniquet was placed at the thigh, which later was inflated to 300 mmHg. A time-out was performed confirming the right knee as the appropriate operative site. The patient received IV antibiotics preoperatively. The knee was prepped and draped in the usual sterile fashion. A knife was used to make a longitudinal incision at the anterior aspect of the knee. The Bovie was used for subcutaneous dissection and hemostasis. Dissection was carried down to the extensor mechanism, which was incised in line with the quadriceps tendon, carried medial to patella and medial to patellar tendon. Upon entering the joint, normal joint fluid was produced.   Soft tissue dissection was carried out and osteophytes were removed until the joint was adequately visualized. Irrisept irrigation was placed in the wound and allowed to sit for 1 minute before evacuating the solution. The knee was then flexed and the drill used to gain intramedullary access to the femur. The intramedullary roxane with the attached cutting guide was set to the appropriate valgus to accommodate the patient's femur per the preoperative templating, the guide was pinned in place and the distal femoral cut made. The caliper was used to confirm appropriate resection of the distal femur. Attention was turned to the tibia which was subluxed anteriorly. After adequate exposure, the extra medullary tibial guide was placed anterior to the leg. The posterior slope was determined, a pin was placed in the sliding hole of the guide. The alignment of the proximal tibia was then set, based on the preoperative templating, and the guide was pinned in place. The saw was then used to resect the proximal tibia, the caliper was used to evaluate the resection. The lollipop was then used to confirm extension gap at 6 mm. The sizing guide was then placed to the distal femur. The size was determined to be a 6, and the drill pins placed. The cutting guide was placed and pinned in position. The resection at the posterior femoral condyles was confirmed to be appropriate. The anterior and posterior femur cuts were made. The lollipop was used to confirm flexion gap was equal to the extension gap before finishing the chamfer cuts and removing the guide. The finishing guide was then placed and pinned in position. The notch cut was made before removing the guide. Attention was turned to the posterior aspect where the osteophytes were removed and soft tissue was released before turning attention back to the tibia. The size 5 tibial trial was found to be an appropriate fit with good coverage and no overhang. The guide was pinned in place with the tower.   The reamer was used followed by the punch, which was left in place for trialing. The femur was impacted in place, the trial polyethylene was placed. The  knee was brought into full extension with good flexion and good ligament balance throughout. Attention was turned to the patella. The caliper was used to measure initial thickness before using the saw in a freehand technique to resect the articular surface of the patella. A size 32 was the appropriate fit and this was placed. Drill holes were made for the patella, the trial was then placed and tracked nicely with the trial femur. The drill holes were then made for the trial femur before removing all trial implants. The bone was prepared for cementing with pulse lavage irrigation. A bone plug was placed to the distal femur. The final implants were opened on the back table and the cement was mixed. When the cement was ready, it was finger-packed into position at the proximal tibia before placing the cement-coated tibial implant, impacting it in place, and removing excess cement. The femur was placed with cement in the same fashion before placing the final polyethylene and bringing the knee into extension, placing the foot on the Grewal to compress the implants while the cement hardened. The patella was placed in the same fashion with cement. A clamp was used to hold it in place while the cement hardened. The knee was then copiously irrigated with pulse lavage irrigation before placing Irrisept into the wound and allowing it to sit for 1 minute before evacuating the solution. When the cement had hardened, the knee was flexed to confirm no further cement needed to be removed. The knee was put through good range of motion with good ligament balance and good patellar tracking. Closure was then performed. A Quill suture was used in a running fashion to close the extensor mechanism.   A #2 Orthocord suture was used in a figure-of-eight fashion at the medial parapatellar region to reinforce the repair. When the repair was complete, the knee was flexed to confirm stable repair. Subcutaneous tissue was then closed with inverted 2-0 Vicryl suture and the skin was closed with staples. A sterile dressing was placed. The patient tolerated the procedure well. There were no complications. Blood loss was approximately 20 mL. Tourniquet time was approximately 75 minutes. The patient was taken to the recovery room in stable condition.

## 2022-05-05 NOTE — ANESTHESIA PROCEDURE NOTES
Regional Block  Performed by: Joao Johnston MD  Authorized by: Joao Johnston MD       General Information and Staff    Start Time:  5/5/2022 2:08 PM  End Time:  5/5/2022 2:10 PM  Anesthesiologist:  Joao Johnston MD  Performed by: Anesthesiologist  Patient Location:  OR    Block Placement: Post Induction  Site Identification: real time ultrasound guided    Site Identification comment:  Image printed and filed in patient's chart  Block site/laterality marked before start: site marked  Reason for Block: at surgeon's request and post-op pain management    Preanesthetic Checklist: 2 patient identifers, IV checked, site marked, risks and benefits discussed, monitors and equipment checked, pre-op evaluation, timeout performed, anesthesia consent, sterile technique used, no prohibitive neurological deficits and no local skin infection at insertion site      Procedure Details    Patient Position:  Supine  Prep: ChloraPrep    Monitoring:  Heart rate, cardiac monitor, continuous pulse ox and blood pressure cuff  Block Type: Adductor canal  Laterality:  Right  Injection Technique:  Single-shot    Needle    Needle Type:  Echogenic (Pajunk Sono Plex II)  Needle Gauge:  21 G  Needle Length:  100 mm  Needle Localization:  Ultrasound guidance  Reason for Ultrasound Use: appropriate spread of the medication was noted in real time and no ultrasound evidence of intravascular and/or intraneural injection            Assessment    Injection Assessment:  Good spread noted, incremental injection, low pressure, negative aspiration for heme and negative resistance  Block paresthesia pain: Block performed after primary anesthetic   Heart Rate Change: No    - Patient tolerated block procedure well without evidence of immediate block related complications.      Medications      Additional Comments    15ml Bupivacaine 0.375% + 2mg Preservative-free Dexamethasone + 150mcg Buprenorphine       Assisted by KEISHA Singh

## 2022-05-05 NOTE — ANESTHESIA PROCEDURE NOTES
Spinal Block  Performed by: Zac Ferro MD  Authorized by: Zac Ferro MD       General Information and Staff    Start Time:  5/5/2022 2:04 PM  End Time:  5/5/2022 2:07 PM  Anesthesiologist:  Zac Ferro MD  Performed by: Anesthesiologist  Patient Location:  OR  Site identification: surface landmarks    Reason for Block: surgical anesthesia    Preanesthetic Checklist: patient identified, IV checked, risks and benefits discussed, monitors and equipment checked, pre-op evaluation, timeout performed, anesthesia consent and sterile technique used      Procedure Details    Patient Position:  Sitting  Prep: ChloraPrep    Monitoring:  Heart rate, cardiac monitor and continuous pulse ox  Approach:  Midline  Location:  L3-4  Injection Technique:  Single-shot    Needle    Needle Type:  Sprotte  Needle Gauge:  24 G  Needle Length:  3.5 in    Assessment    Sensory Level:   Events: clear CSF, CSF aspirated, well tolerated and blood negative     Additional Comments     In sitting position, with mild sedation (pt maintained verbal feedback), patient prepped and draped in standard sterile fashion; mask, hat, and sterile gloves worn; at the approximate L3-4 lumbar level, clear CSF obtained with Sprotte 24G needle on the first pass, 2ml Mepivacaine 2% was injected without any paraesthesias. Patient tolerated procedure well, without any immediate complications. Adequate anesthetic level obtained prior to the incision.

## 2022-05-06 VITALS
DIASTOLIC BLOOD PRESSURE: 67 MMHG | BODY MASS INDEX: 33.31 KG/M2 | RESPIRATION RATE: 18 BRPM | SYSTOLIC BLOOD PRESSURE: 131 MMHG | TEMPERATURE: 98 F | WEIGHT: 224.88 LBS | HEART RATE: 73 BPM | OXYGEN SATURATION: 93 % | HEIGHT: 69 IN

## 2022-05-06 LAB
GLUCOSE BLD-MCNC: 117 MG/DL (ref 70–99)
GLUCOSE BLD-MCNC: 203 MG/DL (ref 70–99)

## 2022-05-06 PROCEDURE — 97535 SELF CARE MNGMENT TRAINING: CPT

## 2022-05-06 PROCEDURE — 82962 GLUCOSE BLOOD TEST: CPT

## 2022-05-06 PROCEDURE — 97161 PT EVAL LOW COMPLEX 20 MIN: CPT

## 2022-05-06 PROCEDURE — 97165 OT EVAL LOW COMPLEX 30 MIN: CPT

## 2022-05-06 PROCEDURE — 97116 GAIT TRAINING THERAPY: CPT

## 2022-05-06 RX ORDER — ASPIRIN 325 MG
325 TABLET ORAL 2 TIMES DAILY
Refills: 0 | Status: SHIPPED | COMMUNITY
Start: 2022-05-06

## 2022-05-06 NOTE — PROGRESS NOTES
Patient admitted via bed from PACU. Oriented to room. Safety precautions initiated. Call light in reach.

## 2022-05-06 NOTE — PLAN OF CARE
A&O x4. VSS. 3L O2 per nc overnight. . Pain well controlled with PO medication, see MAR for new orders. Nerve block still in effect, reports N/T to RLE. Voiding per bedpan without difficulty. Gel ice applied. Aquacel drsg C/D/I. Spandagrip in place. Bilateral SCDs. Saline locked. Reviewed POC, pain management, IS use, and fall precautions with pt. Bed alarm on w/bed in lowest position. Pt reminded to use call light. Verbalized understanding. Will continue to monitor. Plan for PT/OT to see.

## 2022-05-06 NOTE — ADDENDUM NOTE
Addendum  created 05/06/22 0755 by Madhav Renee MD    Child order released for a procedure order, Clinical Note Signed, Intraprocedure Blocks edited

## 2022-05-06 NOTE — PROGRESS NOTES
NURSING DISCHARGE NOTE    Discharged Home via Wheelchair. Accompanied by Family member  Belongings Taken by patient/family.   Discharge instructions discussed with patient, he verbalized understanding

## 2022-05-06 NOTE — PLAN OF CARE
Problem: PAIN - ADULT  Goal: Verbalizes/displays adequate comfort level or patient's stated pain goal  Description: INTERVENTIONS:  - Encourage pt to monitor pain and request assistance  - Assess pain using appropriate pain scale  - Administer analgesics based on type and severity of pain and evaluate response  - Implement non-pharmacological measures as appropriate and evaluate response  - Consider cultural and social influences on pain and pain management  - Manage/alleviate anxiety  - Utilize distraction and/or relaxation techniques  - Monitor for opioid side effects  - Notify MD/LIP if interventions unsuccessful or patient reports new pain  - Anticipate increased pain with activity and pre-medicate as appropriate  Outcome: Progressing     Problem: SAFETY ADULT - FALL  Goal: Free from fall injury  Description: INTERVENTIONS:  - Assess pt frequently for physical needs  - Identify cognitive and physical deficits and behaviors that affect risk of falls. - [place] fall precautions as indicated by assessment.  - Educate pt/family on patient safety including physical limitations  - Instruct pt to call for assistance with activity based on assessment  - Modify environment to reduce risk of injury  - Provide assistive devices as appropriate  - Consider OT/PT consult to assist with strengthening/mobility  - Encourage toileting schedule  Outcome: Progressing  Received patient already up in the chair, a little sleepy, easily awaken. Left knee with Aquacel dressing in place, looks clean and dry. Patient reported pain level of 7 still after taking Norco 5/325 mg 2 tablet at 0600. Able to participate with OT this morning despite pain issue. PT to see her at 1015. Dr. Debbie Rosales seen patient this morning, discharge will depend on patient's progress with mobility and adequate pain control with oral pain medication.

## 2022-05-06 NOTE — ANESTHESIA PROCEDURE NOTES
Regional Block  Performed by: Miguel Johnson MD  Authorized by: Miguel Johnson MD       General Information and Staff    Start Time:  5/6/2022 5:30 PM  End Time:  5/6/2022 5:35 PM  Anesthesiologist:  Miguel Johnson MD  Performed by: Anesthesiologist  Patient Location:  PACU      Site Identification: real time ultrasound guided    Site Identification comment:  Image printed and filed in patient's chart  Block site/laterality marked before start: site marked  Reason for Block: at surgeon's request and post-op pain management    Preanesthetic Checklist: 2 patient identifers, IV checked, site marked, risks and benefits discussed, monitors and equipment checked, pre-op evaluation, timeout performed, anesthesia consent, sterile technique used, no prohibitive neurological deficits and no local skin infection at insertion site      Procedure Details    Patient Position:  Supine  Prep: ChloraPrep    Monitoring:  Heart rate, cardiac monitor, continuous pulse ox and blood pressure cuff  Block Type: Adductor canal  Laterality:  Right  Injection Technique:  Single-shot    Needle    Needle Type:  Echogenic (Pajunk Sono Plex II)  Needle Gauge:  22 G  Needle Length:  100 mm  Needle Localization:  Ultrasound guidance and nerve stimulator  Reason for Ultrasound Use: appropriate spread of the medication was noted in real time and no ultrasound evidence of intravascular and/or intraneural injection    Nerve Stimulator: 0.5 amps    Muscle Twitch Response Comment: no twitch    Assessment    Injection Assessment:  Good spread noted, incremental injection, low pressure, negative aspiration for heme, negative resistance and no pain on injection  Paresthesia Pain:  None  Heart Rate Change: No    - Patient tolerated block procedure well without evidence of immediate block related complications.      Medications      Additional Comments    10ml Ropivacaine 0.25%       Assessed patient for severe pain in PACU, pt has pain all over the anterior knee. 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 Offered to redo adductor canal block, but explained that it will unlikely take care most of her pain. Pt understanding and wishes to proceed. Following the nerve block shed did get some relief but still had moderate amount of pain.

## 2022-07-27 ENCOUNTER — HOSPITAL ENCOUNTER (EMERGENCY)
Age: 62
Discharge: HOME | End: 2022-07-27
Payer: MEDICAID

## 2022-07-27 ENCOUNTER — APPOINTMENT (OUTPATIENT)
Dept: GENERAL RADIOLOGY | Age: 62
End: 2022-07-27
Payer: MEDICAID

## 2022-07-27 VITALS
WEIGHT: 226 LBS | BODY MASS INDEX: 33 KG/M2 | RESPIRATION RATE: 18 BRPM | SYSTOLIC BLOOD PRESSURE: 115 MMHG | DIASTOLIC BLOOD PRESSURE: 75 MMHG | HEART RATE: 78 BPM | TEMPERATURE: 98 F | OXYGEN SATURATION: 98 %

## 2022-07-27 DIAGNOSIS — S46.912A STRAIN OF LEFT SHOULDER, INITIAL ENCOUNTER: Primary | ICD-10-CM

## 2022-07-27 PROCEDURE — 99283 EMERGENCY DEPT VISIT LOW MDM: CPT

## 2022-07-27 PROCEDURE — 73030 X-RAY EXAM OF SHOULDER: CPT

## 2022-08-18 NOTE — PATIENT INSTRUCTIONS
Bladder Infection, Female (Adult)    Urine is normally doesn't have any bacteria in it. But bacteria can get into the urinary tract from the skin around the rectum. Or they can travel in the blood from elsewhere in the body.  Once they are in your urinary · Bowel incontinence  · Pregnancy  · Procedures such as having a catheter inserted  · Older age  · Not emptying your bladder. This can allow bacteria a chance to grow in your urine.   · Dehydration  · Constipation  · Sex  · Use of a diaphragm for birth cont · Avoid caffeine, alcohol, and spicy foods. These can irritate your bladder. · Urinate right after intercourse to flush out your bladder. · If you use birth control pills and have frequent bladder infections, discuss it with your doctor.   Follow-up care Never

## 2022-10-25 ENCOUNTER — HOSPITAL ENCOUNTER (OUTPATIENT)
Facility: HOSPITAL | Age: 62
Setting detail: OBSERVATION
Discharge: HOME | End: 2022-10-27
Attending: EMERGENCY MEDICINE | Admitting: HOSPITALIST
Payer: MEDICAID

## 2022-10-25 ENCOUNTER — APPOINTMENT (OUTPATIENT)
Dept: GENERAL RADIOLOGY | Facility: HOSPITAL | Age: 62
End: 2022-10-25
Attending: EMERGENCY MEDICINE
Payer: MEDICAID

## 2022-10-25 ENCOUNTER — APPOINTMENT (OUTPATIENT)
Dept: CT IMAGING | Facility: HOSPITAL | Age: 62
End: 2022-10-25
Attending: EMERGENCY MEDICINE
Payer: MEDICAID

## 2022-10-25 DIAGNOSIS — I20.0 UNSTABLE ANGINA (HCC): Primary | ICD-10-CM

## 2022-10-25 LAB
ALBUMIN SERPL-MCNC: 4 G/DL (ref 3.4–5)
ALBUMIN/GLOB SERPL: 1.2 {RATIO} (ref 1–2)
ALP LIVER SERPL-CCNC: 69 U/L
ALT SERPL-CCNC: 24 U/L
ANION GAP SERPL CALC-SCNC: 6 MMOL/L (ref 0–18)
AST SERPL-CCNC: 14 U/L (ref 15–37)
BASOPHILS # BLD AUTO: 0.07 X10(3) UL (ref 0–0.2)
BASOPHILS NFR BLD AUTO: 0.6 %
BILIRUB SERPL-MCNC: 0.4 MG/DL (ref 0.1–2)
BUN BLD-MCNC: 17 MG/DL (ref 7–18)
CALCIUM BLD-MCNC: 9.3 MG/DL (ref 8.5–10.1)
CHLORIDE SERPL-SCNC: 104 MMOL/L (ref 98–112)
CO2 SERPL-SCNC: 28 MMOL/L (ref 21–32)
CREAT BLD-MCNC: 0.87 MG/DL
D DIMER PPP FEU-MCNC: 1.02 UG/ML FEU (ref ?–0.64)
EOSINOPHIL # BLD AUTO: 0.1 X10(3) UL (ref 0–0.7)
EOSINOPHIL NFR BLD AUTO: 0.8 %
ERYTHROCYTE [DISTWIDTH] IN BLOOD BY AUTOMATED COUNT: 13.3 %
GFR SERPLBLD BASED ON 1.73 SQ M-ARVRAT: 74 ML/MIN/1.73M2 (ref 60–?)
GLOBULIN PLAS-MCNC: 3.3 G/DL (ref 2.8–4.4)
GLUCOSE BLD-MCNC: 89 MG/DL (ref 70–99)
GLUCOSE BLD-MCNC: 92 MG/DL (ref 70–99)
HCT VFR BLD AUTO: 40.6 %
HGB BLD-MCNC: 13.9 G/DL
IMM GRANULOCYTES # BLD AUTO: 0.05 X10(3) UL (ref 0–1)
IMM GRANULOCYTES NFR BLD: 0.4 %
LYMPHOCYTES # BLD AUTO: 4.08 X10(3) UL (ref 1–4)
LYMPHOCYTES NFR BLD AUTO: 33 %
MCH RBC QN AUTO: 30.9 PG (ref 26–34)
MCHC RBC AUTO-ENTMCNC: 34.2 G/DL (ref 31–37)
MCV RBC AUTO: 90.2 FL
MONOCYTES # BLD AUTO: 0.83 X10(3) UL (ref 0.1–1)
MONOCYTES NFR BLD AUTO: 6.7 %
NEUTROPHILS # BLD AUTO: 7.23 X10 (3) UL (ref 1.5–7.7)
NEUTROPHILS # BLD AUTO: 7.23 X10(3) UL (ref 1.5–7.7)
NEUTROPHILS NFR BLD AUTO: 58.5 %
OSMOLALITY SERPL CALC.SUM OF ELEC: 287 MOSM/KG (ref 275–295)
PLATELET # BLD AUTO: 334 10(3)UL (ref 150–450)
POTASSIUM SERPL-SCNC: 3.5 MMOL/L (ref 3.5–5.1)
PROT SERPL-MCNC: 7.3 G/DL (ref 6.4–8.2)
RBC # BLD AUTO: 4.5 X10(6)UL
SARS-COV-2 RNA RESP QL NAA+PROBE: NOT DETECTED
SODIUM SERPL-SCNC: 138 MMOL/L (ref 136–145)
TROPONIN I HIGH SENSITIVITY: 5 NG/L
WBC # BLD AUTO: 12.4 X10(3) UL (ref 4–11)

## 2022-10-25 PROCEDURE — 71045 X-RAY EXAM CHEST 1 VIEW: CPT | Performed by: EMERGENCY MEDICINE

## 2022-10-25 PROCEDURE — 85025 COMPLETE CBC W/AUTO DIFF WBC: CPT | Performed by: EMERGENCY MEDICINE

## 2022-10-25 PROCEDURE — 93005 ELECTROCARDIOGRAM TRACING: CPT

## 2022-10-25 PROCEDURE — 80053 COMPREHEN METABOLIC PANEL: CPT | Performed by: EMERGENCY MEDICINE

## 2022-10-25 PROCEDURE — 85379 FIBRIN DEGRADATION QUANT: CPT | Performed by: EMERGENCY MEDICINE

## 2022-10-25 PROCEDURE — 82962 GLUCOSE BLOOD TEST: CPT

## 2022-10-25 PROCEDURE — 36415 COLL VENOUS BLD VENIPUNCTURE: CPT

## 2022-10-25 PROCEDURE — 84484 ASSAY OF TROPONIN QUANT: CPT | Performed by: EMERGENCY MEDICINE

## 2022-10-25 PROCEDURE — 99285 EMERGENCY DEPT VISIT HI MDM: CPT

## 2022-10-25 PROCEDURE — 71275 CT ANGIOGRAPHY CHEST: CPT | Performed by: EMERGENCY MEDICINE

## 2022-10-25 PROCEDURE — 93010 ELECTROCARDIOGRAM REPORT: CPT

## 2022-10-25 RX ORDER — PANTOPRAZOLE SODIUM 40 MG/1
40 TABLET, DELAYED RELEASE ORAL
Status: DISCONTINUED | OUTPATIENT
Start: 2022-10-26 | End: 2022-10-27

## 2022-10-25 RX ORDER — ARIPIPRAZOLE 5 MG/1
5 TABLET ORAL DAILY
Status: DISCONTINUED | OUTPATIENT
Start: 2022-10-25 | End: 2022-10-27

## 2022-10-25 RX ORDER — ALPRAZOLAM 0.5 MG/1
0.5 TABLET ORAL DAILY PRN
Status: DISCONTINUED | OUTPATIENT
Start: 2022-10-25 | End: 2022-10-27

## 2022-10-25 RX ORDER — LISINOPRIL 20 MG/1
20 TABLET ORAL DAILY
Status: DISCONTINUED | OUTPATIENT
Start: 2022-10-26 | End: 2022-10-27

## 2022-10-25 RX ORDER — BUSPIRONE HYDROCHLORIDE 5 MG/1
20 TABLET ORAL 3 TIMES DAILY
Status: DISCONTINUED | OUTPATIENT
Start: 2022-10-25 | End: 2022-10-27

## 2022-10-25 RX ORDER — NICOTINE POLACRILEX 4 MG
30 LOZENGE BUCCAL
Status: DISCONTINUED | OUTPATIENT
Start: 2022-10-25 | End: 2022-10-27

## 2022-10-25 RX ORDER — HEPARIN SODIUM 5000 [USP'U]/ML
5000 INJECTION, SOLUTION INTRAVENOUS; SUBCUTANEOUS EVERY 8 HOURS SCHEDULED
Status: DISCONTINUED | OUTPATIENT
Start: 2022-10-25 | End: 2022-10-27

## 2022-10-25 RX ORDER — MELATONIN
3 NIGHTLY PRN
Status: DISCONTINUED | OUTPATIENT
Start: 2022-10-25 | End: 2022-10-27

## 2022-10-25 RX ORDER — ATORVASTATIN CALCIUM 20 MG/1
20 TABLET, FILM COATED ORAL NIGHTLY
Status: DISCONTINUED | OUTPATIENT
Start: 2022-10-25 | End: 2022-10-26

## 2022-10-25 RX ORDER — TRAZODONE HYDROCHLORIDE 50 MG/1
150 TABLET ORAL NIGHTLY
Status: DISCONTINUED | OUTPATIENT
Start: 2022-10-25 | End: 2022-10-27

## 2022-10-25 RX ORDER — PREGABALIN 25 MG/1
50 CAPSULE ORAL 3 TIMES DAILY
Status: DISCONTINUED | OUTPATIENT
Start: 2022-10-25 | End: 2022-10-27

## 2022-10-25 RX ORDER — IOHEXOL 350 MG/ML
100 INJECTION, SOLUTION INTRAVENOUS
Status: COMPLETED | OUTPATIENT
Start: 2022-10-25 | End: 2022-10-25

## 2022-10-25 RX ORDER — ASPIRIN 81 MG/1
324 TABLET, CHEWABLE ORAL ONCE
Status: COMPLETED | OUTPATIENT
Start: 2022-10-25 | End: 2022-10-25

## 2022-10-25 RX ORDER — VENLAFAXINE HYDROCHLORIDE 75 MG/1
225 CAPSULE, EXTENDED RELEASE ORAL
Status: DISCONTINUED | OUTPATIENT
Start: 2022-10-25 | End: 2022-10-27

## 2022-10-25 RX ORDER — DEXTROSE MONOHYDRATE 25 G/50ML
50 INJECTION, SOLUTION INTRAVENOUS
Status: DISCONTINUED | OUTPATIENT
Start: 2022-10-25 | End: 2022-10-27

## 2022-10-25 RX ORDER — ONDANSETRON 2 MG/ML
4 INJECTION INTRAMUSCULAR; INTRAVENOUS EVERY 6 HOURS PRN
Status: DISCONTINUED | OUTPATIENT
Start: 2022-10-25 | End: 2022-10-27

## 2022-10-25 RX ORDER — NICOTINE POLACRILEX 4 MG
15 LOZENGE BUCCAL
Status: DISCONTINUED | OUTPATIENT
Start: 2022-10-25 | End: 2022-10-27

## 2022-10-25 RX ORDER — ACETAMINOPHEN 500 MG
1000 TABLET ORAL 3 TIMES DAILY PRN
Status: DISCONTINUED | OUTPATIENT
Start: 2022-10-25 | End: 2022-10-27

## 2022-10-25 RX ORDER — TRIAMTERENE AND HYDROCHLOROTHIAZIDE 37.5; 25 MG/1; MG/1
1 CAPSULE ORAL EVERY MORNING
Status: DISCONTINUED | OUTPATIENT
Start: 2022-10-26 | End: 2022-10-27

## 2022-10-25 NOTE — ED INITIAL ASSESSMENT (HPI)
Patient relates she is experiencing chest tightness radiating to the back along with palpitations and shortness of breath that began earlier today. Patient also complaining of a headache and dizziness. Denies N/V/D.

## 2022-10-26 ENCOUNTER — APPOINTMENT (OUTPATIENT)
Dept: CV DIAGNOSTICS | Facility: HOSPITAL | Age: 62
End: 2022-10-26
Attending: INTERNAL MEDICINE
Payer: MEDICAID

## 2022-10-26 LAB
ATRIAL RATE: 90 BPM
GLUCOSE BLD-MCNC: 107 MG/DL (ref 70–99)
GLUCOSE BLD-MCNC: 127 MG/DL (ref 70–99)
GLUCOSE BLD-MCNC: 89 MG/DL (ref 70–99)
GLUCOSE BLD-MCNC: 95 MG/DL (ref 70–99)
P AXIS: 43 DEGREES
P-R INTERVAL: 148 MS
Q-T INTERVAL: 368 MS
QRS DURATION: 80 MS
QTC CALCULATION (BEZET): 450 MS
R AXIS: -15 DEGREES
T AXIS: 51 DEGREES
VENTRICULAR RATE: 90 BPM

## 2022-10-26 PROCEDURE — 93306 TTE W/DOPPLER COMPLETE: CPT | Performed by: INTERNAL MEDICINE

## 2022-10-26 PROCEDURE — 82962 GLUCOSE BLOOD TEST: CPT

## 2022-10-26 PROCEDURE — 93017 CV STRESS TEST TRACING ONLY: CPT

## 2022-10-26 PROCEDURE — 93018 CV STRESS TEST I&R ONLY: CPT

## 2022-10-26 PROCEDURE — 78452 HT MUSCLE IMAGE SPECT MULT: CPT

## 2022-10-26 RX ORDER — METOPROLOL SUCCINATE 25 MG/1
25 TABLET, EXTENDED RELEASE ORAL
Status: DISCONTINUED | OUTPATIENT
Start: 2022-10-27 | End: 2022-10-27

## 2022-10-26 RX ORDER — ASPIRIN 81 MG/1
81 TABLET ORAL DAILY
Status: DISCONTINUED | OUTPATIENT
Start: 2022-10-26 | End: 2022-10-27

## 2022-10-26 RX ORDER — ATORVASTATIN CALCIUM 40 MG/1
40 TABLET, FILM COATED ORAL NIGHTLY
Status: DISCONTINUED | OUTPATIENT
Start: 2022-10-26 | End: 2022-10-27

## 2022-10-26 NOTE — PLAN OF CARE
Preliminary Nuclear stress test results as called to me by Dr. Elias Ponce in Radiology:    LVEF 70% and negative for perfusion abnormalities. Reviewed with Dr. Jose Ramon Castro

## 2022-10-26 NOTE — PLAN OF CARE
Pt AO x4. NSR on tele monitor. No complaints of chest pain or SOB. O2 saturations WNL on room air. Up ad elvi. POC discussed with pt. Call light within reach. Problem: Diabetes/Glucose Control  Goal: Glucose maintained within prescribed range  Description: INTERVENTIONS:  - Monitor Blood Glucose as ordered  - Assess for signs and symptoms of hyperglycemia and hypoglycemia  - Administer ordered medications to maintain glucose within target range  - Assess barriers to adequate nutritional intake and initiate nutrition consult as needed  - Instruct patient on self management of diabetes  Outcome: Progressing     Problem: CARDIOVASCULAR - ADULT  Goal: Maintains optimal cardiac output and hemodynamic stability  Description: INTERVENTIONS:  - Monitor vital signs, rhythm, and trends  - Monitor for bleeding, hypotension and signs of decreased cardiac output  - Evaluate effectiveness of vasoactive medications to optimize hemodynamic stability  - Monitor arterial and/or venous puncture sites for bleeding and/or hematoma  - Assess quality of pulses, skin color and temperature  - Assess for signs of decreased coronary artery perfusion - ex.  Angina  - Evaluate fluid balance, assess for edema, trend weights  Outcome: Progressing  Goal: Absence of cardiac arrhythmias or at baseline  Description: INTERVENTIONS:  - Continuous cardiac monitoring, monitor vital signs, obtain 12 lead EKG if indicated  - Evaluate effectiveness of antiarrhythmic and heart rate control medications as ordered  - Initiate emergency measures for life threatening arrhythmias  - Monitor electrolytes and administer replacement therapy as ordered  Outcome: Progressing

## 2022-10-26 NOTE — PROGRESS NOTES
CARDIODIAGNOSITC PRELIMINARY REPORT:    Palak Cole completed, tolerated well    Second set of images pending

## 2022-10-26 NOTE — ED QUICK NOTES
Orders for admission, patient is aware of plan and ready to go upstairs. Any questions, please call ED KEISHA gudino at extension [phone].      Patient Covid vaccination status: Unvaccinated     COVID Test Ordered in ED: Rapid SARS-CoV-2 by PCR    COVID Suspicion at Admission: Low clinical suspicion for COVID    Running Infusions:  None    Mental Status/LOC at time of transport: aox4    Other pertinent information:   CIWA score: N/A   NIH score:  N/A

## 2022-10-26 NOTE — PLAN OF CARE
Received patient, alert and oriented. Still with some tightness on her chest but claimed it's much better. Denied SOB at rest. Up to bathroom independently. Discussed POC. Due meds given. Instructed no caffeine and will have treadmill breakfast in AM. Safety measures reinforced, call light within reach. Needs attended to. Will continue to monitor. Problem: Diabetes/Glucose Control  Goal: Glucose maintained within prescribed range  Description: INTERVENTIONS:  - Monitor Blood Glucose as ordered  - Assess for signs and symptoms of hyperglycemia and hypoglycemia  - Administer ordered medications to maintain glucose within target range  - Assess barriers to adequate nutritional intake and initiate nutrition consult as needed  - Instruct patient on self management of diabetes  Outcome: Progressing     Problem: CARDIOVASCULAR - ADULT  Goal: Maintains optimal cardiac output and hemodynamic stability  Description: INTERVENTIONS:  - Monitor vital signs, rhythm, and trends  - Monitor for bleeding, hypotension and signs of decreased cardiac output  - Evaluate effectiveness of vasoactive medications to optimize hemodynamic stability  - Monitor arterial and/or venous puncture sites for bleeding and/or hematoma  - Assess quality of pulses, skin color and temperature  - Assess for signs of decreased coronary artery perfusion - ex.  Angina  - Evaluate fluid balance, assess for edema, trend weights  Outcome: Progressing  Goal: Absence of cardiac arrhythmias or at baseline  Description: INTERVENTIONS:  - Continuous cardiac monitoring, monitor vital signs, obtain 12 lead EKG if indicated  - Evaluate effectiveness of antiarrhythmic and heart rate control medications as ordered  - Initiate emergency measures for life threatening arrhythmias  - Monitor electrolytes and administer replacement therapy as ordered  Outcome: Progressing

## 2022-10-27 ENCOUNTER — APPOINTMENT (OUTPATIENT)
Dept: INTERVENTIONAL RADIOLOGY/VASCULAR | Facility: HOSPITAL | Age: 62
End: 2022-10-27
Payer: MEDICAID

## 2022-10-27 VITALS
HEART RATE: 75 BPM | SYSTOLIC BLOOD PRESSURE: 120 MMHG | WEIGHT: 233 LBS | BODY MASS INDEX: 34.51 KG/M2 | DIASTOLIC BLOOD PRESSURE: 57 MMHG | RESPIRATION RATE: 18 BRPM | OXYGEN SATURATION: 89 % | TEMPERATURE: 98 F | HEIGHT: 69 IN

## 2022-10-27 LAB
GLUCOSE BLD-MCNC: 104 MG/DL (ref 70–99)
GLUCOSE BLD-MCNC: 99 MG/DL (ref 70–99)

## 2022-10-27 PROCEDURE — 93458 L HRT ARTERY/VENTRICLE ANGIO: CPT | Performed by: INTERNAL MEDICINE

## 2022-10-27 PROCEDURE — 99152 MOD SED SAME PHYS/QHP 5/>YRS: CPT | Performed by: INTERNAL MEDICINE

## 2022-10-27 PROCEDURE — 82962 GLUCOSE BLOOD TEST: CPT

## 2022-10-27 PROCEDURE — B2111ZZ FLUOROSCOPY OF MULTIPLE CORONARY ARTERIES USING LOW OSMOLAR CONTRAST: ICD-10-PCS | Performed by: INTERNAL MEDICINE

## 2022-10-27 PROCEDURE — 4A023N7 MEASUREMENT OF CARDIAC SAMPLING AND PRESSURE, LEFT HEART, PERCUTANEOUS APPROACH: ICD-10-PCS | Performed by: INTERNAL MEDICINE

## 2022-10-27 RX ORDER — LIDOCAINE HYDROCHLORIDE 10 MG/ML
INJECTION, SOLUTION EPIDURAL; INFILTRATION; INTRACAUDAL; PERINEURAL
Status: COMPLETED
Start: 2022-10-27 | End: 2022-10-27

## 2022-10-27 RX ORDER — HEPARIN SODIUM 5000 [USP'U]/ML
INJECTION, SOLUTION INTRAVENOUS; SUBCUTANEOUS
Status: COMPLETED
Start: 2022-10-27 | End: 2022-10-27

## 2022-10-27 RX ORDER — ASPIRIN 81 MG/1
81 TABLET ORAL DAILY
Qty: 90 TABLET | Refills: 3 | Status: SHIPPED | OUTPATIENT
Start: 2022-10-28

## 2022-10-27 RX ORDER — VERAPAMIL HYDROCHLORIDE 2.5 MG/ML
INJECTION, SOLUTION INTRAVENOUS
Status: COMPLETED
Start: 2022-10-27 | End: 2022-10-27

## 2022-10-27 RX ORDER — MIDAZOLAM HYDROCHLORIDE 1 MG/ML
INJECTION INTRAMUSCULAR; INTRAVENOUS
Status: COMPLETED
Start: 2022-10-27 | End: 2022-10-27

## 2022-10-27 RX ORDER — SODIUM CHLORIDE 9 MG/ML
INJECTION, SOLUTION INTRAVENOUS CONTINUOUS
Status: ACTIVE | OUTPATIENT
Start: 2022-10-27 | End: 2022-10-27

## 2022-10-27 RX ORDER — ATORVASTATIN CALCIUM 40 MG/1
40 TABLET, FILM COATED ORAL NIGHTLY
Qty: 30 TABLET | Refills: 2 | Status: SHIPPED | OUTPATIENT
Start: 2022-10-27

## 2022-10-27 RX ORDER — NITROGLYCERIN 20 MG/100ML
INJECTION INTRAVENOUS
Status: COMPLETED
Start: 2022-10-27 | End: 2022-10-27

## 2022-10-27 RX ORDER — METOPROLOL SUCCINATE 25 MG/1
25 TABLET, EXTENDED RELEASE ORAL
Qty: 30 TABLET | Refills: 2 | Status: SHIPPED | OUTPATIENT
Start: 2022-10-28

## 2022-10-27 RX ORDER — SODIUM CHLORIDE 9 MG/ML
INJECTION, SOLUTION INTRAVENOUS
Status: DISCONTINUED | OUTPATIENT
Start: 2022-10-28 | End: 2022-10-27 | Stop reason: HOSPADM

## 2022-10-27 NOTE — PROCEDURES
OPERATIVE REPORT - CARDIAC CATHETERIZATION    Date of Procedure: 10/27/2022     Performing Physician: Nai Salinas. Tiffanie Florian MD    Pre-Procedure Diagnosis:   1. Exertional dyspnea   2. Chest discomfort  3. HTN  4. HL  5. DM  6. Coronary artery calcification    Post-Procedure Diagnosis:  1. Same as pre    Findings:  1. Left main: Normal   2. LAD: Transapical, supplies one major branching diagonal. Luminal irregularities throughout. 3. Left circumflex: Small vessel, luminal irregularities    4. RCA: Large dominant to PDA and large RPL. Mid 40%. 5. Hemodynamics: LVEDP 10 mmHg. No aortic stenosis on pullback. Recommendations:  1. Remove the TR band per protocol  2. Post cath IVF  3. Continue medical therapy  4. OK for dc home today. Procedures performed:   1. Left heart catheterization  2. Selective bilateral coronary angiography  3. Moderate sedation  4. Access of right radial artery    Indications: This is a 57year old female presenting with chest pain and DONIS. Referred for left heart catheterization with coronary angiography to evaluate for obstructive CAD. Description of Procedure: Informed consent was obtained from the patient in writing. The risks and benefits of the procedure were reviewed in detail with the patient, including but not limited to the risk of myocardial infarction, stroke, renal failure, bleeding, and death. After a thorough discussion of these risks and benefits, the patient agreed to proceed and signed an informed consent document. The patient was brought to the cardiac catheterization laboratory in the fasting state. Moderate sedation was employed using a total of IV Versed 4 mg and IV fentanyl 100 mcg in divided doses. I directly observed the patient from [phone] to [phone], and an independent trained observer was present and assisted in the monitoring of the patient's level of consciousness and physiological status, heart rate, blood pressure, oximetry, and rhythm.  All operators present for the case performed standard pre-procedural prep including hand washing, sterile gloves, gown, mask, and cap. All aspects of the maximum sterile barrier technique were followed. A preprocedural time out was performed with all physicians, technologists, and nurses involved with the procedure. 2% lidocaine was infiltrated subcutaneously in the right wrist for local anesthesia. Access was obtained in the right radial artery with a 5/6F Slender Glidesheath using the Seldinger technique and a micropuncture needle. Standard vasodilator cocktail was given with heparin 2500 units, nitroglycerin 200 mcg and verapamil 2.5 mg through the arterial sheath. A 6F TIG catheter was advanced over a J tipped wire through the arterial sheath and placed in the aortic root, then used to selectively engage the left coronary artery. Standard angiographic views were taken in orthogonal projections. The RCA was then engaged and standard angiographic views were performed. The catheter was then prolapsed into the LV over a wire and placed at the base of the LV. LV systolic and end diastolic pressure was then recorded. The catheter was pulled back and pullback measurements of LV and aortic pressure and recorded. The catheter was then removed over a wire. At the conclusion of the procedure, all catheters and wires were removed over a wire. The arterial sheath was removed and hemostasis achieved with standard TR band using patent hemostasis technique. There was no bleeding or hematoma. The patient tolerated the procedure well without complication. EBL <10 mL. Total contrast ~ 75 mL. See procedure log for fluoro time and radiation dose. Dorinda Friedman MD  Interventional Cardiology  [facility], [place]

## 2022-10-27 NOTE — PROGRESS NOTES
Patient cleared for discharge by primary and consults. IV removed, tele monitor discontinued. Discharge paperwork/information given, including medications and follow-up appointments. All questions answered. All belongings sent with patient. Transported off unit via wheelchair.

## 2022-10-27 NOTE — PLAN OF CARE
Assumed care of pt at 0730. A/ox4, rm air, SR on tele. No reports of pain. Breath sounds clear upon auscultation. Denies cough. Reports dyspnea on exertion, which pt states is \"not new\" for her. Impaired vision - wears glasses. Right wrist incision site from cath. Discussed POC w/ pt. Problem: Diabetes/Glucose Control  Goal: Glucose maintained within prescribed range  Description: INTERVENTIONS:  - Monitor Blood Glucose as ordered  - Assess for signs and symptoms of hyperglycemia and hypoglycemia  - Administer ordered medications to maintain glucose within target range  - Assess barriers to adequate nutritional intake and initiate nutrition consult as needed  - Instruct patient on self management of diabetes  Outcome: Progressing     Problem: CARDIOVASCULAR - ADULT  Goal: Maintains optimal cardiac output and hemodynamic stability  Description: INTERVENTIONS:  - Monitor vital signs, rhythm, and trends  - Monitor for bleeding, hypotension and signs of decreased cardiac output  - Evaluate effectiveness of vasoactive medications to optimize hemodynamic stability  - Monitor arterial and/or venous puncture sites for bleeding and/or hematoma  - Assess quality of pulses, skin color and temperature  - Assess for signs of decreased coronary artery perfusion - ex.  Angina  - Evaluate fluid balance, assess for edema, trend weights  Outcome: Progressing  Goal: Absence of cardiac arrhythmias or at baseline  Description: INTERVENTIONS:  - Continuous cardiac monitoring, monitor vital signs, obtain 12 lead EKG if indicated  - Evaluate effectiveness of antiarrhythmic and heart rate control medications as ordered  - Initiate emergency measures for life threatening arrhythmias  - Monitor electrolytes and administer replacement therapy as ordered  Outcome: Progressing     Problem: Patient/Family Goals  Goal: Patient/Family Long Term Goal  Description: Patient's Long Term Goal: to go home    Interventions:  - MD ramírez, medication management, monitor labs, cath  - See additional Care Plan goals for specific interventions  Outcome: Progressing  Goal: Patient/Family Short Term Goal  Description: Patient's Short Term Goal: remain pain free    Interventions:   - pain assessments q4, pain meds PRN  - See additional Care Plan goals for specific interventions  Outcome: Progressing

## 2022-10-27 NOTE — PLAN OF CARE
Alert and oriented x4 on tele monitor hr 70's sinus rhythm. Denies any pain. Instructed npo after mn for heart cath in am and verbalized understanding. All needs attended and will continue to monitor. Call light within reach. Problem: Diabetes/Glucose Control  Goal: Glucose maintained within prescribed range  Description: INTERVENTIONS:  - Monitor Blood Glucose as ordered  - Assess for signs and symptoms of hyperglycemia and hypoglycemia  - Administer ordered medications to maintain glucose within target range  - Assess barriers to adequate nutritional intake and initiate nutrition consult as needed  - Instruct patient on self management of diabetes  Outcome: Progressing     Problem: CARDIOVASCULAR - ADULT  Goal: Maintains optimal cardiac output and hemodynamic stability  Description: INTERVENTIONS:  - Monitor vital signs, rhythm, and trends  - Monitor for bleeding, hypotension and signs of decreased cardiac output  - Evaluate effectiveness of vasoactive medications to optimize hemodynamic stability  - Monitor arterial and/or venous puncture sites for bleeding and/or hematoma  - Assess quality of pulses, skin color and temperature  - Assess for signs of decreased coronary artery perfusion - ex.  Angina  - Evaluate fluid balance, assess for edema, trend weights  Outcome: Progressing  Goal: Absence of cardiac arrhythmias or at baseline  Description: INTERVENTIONS:  - Continuous cardiac monitoring, monitor vital signs, obtain 12 lead EKG if indicated  - Evaluate effectiveness of antiarrhythmic and heart rate control medications as ordered  - Initiate emergency measures for life threatening arrhythmias  - Monitor electrolytes and administer replacement therapy as ordered  Outcome: Progressing

## 2022-11-14 RX ORDER — MELOXICAM 15 MG/1
15 TABLET ORAL DAILY
COMMUNITY
End: 2022-12-02

## 2022-11-14 RX ORDER — SCOLOPAMINE TRANSDERMAL SYSTEM 1 MG/1
1 PATCH, EXTENDED RELEASE TRANSDERMAL ONCE
Status: CANCELLED | OUTPATIENT
Start: 2022-11-14 | End: 2022-11-14

## 2022-11-28 ENCOUNTER — LAB ENCOUNTER (OUTPATIENT)
Dept: LAB | Facility: HOSPITAL | Age: 62
End: 2022-11-28
Attending: ORTHOPAEDIC SURGERY
Payer: MEDICAID

## 2022-11-28 DIAGNOSIS — Z01.818 PRE-OP TESTING: ICD-10-CM

## 2022-11-29 LAB — SARS-COV-2 RNA RESP QL NAA+PROBE: NOT DETECTED

## 2022-12-01 ENCOUNTER — APPOINTMENT (OUTPATIENT)
Dept: GENERAL RADIOLOGY | Facility: HOSPITAL | Age: 62
End: 2022-12-01
Attending: NURSE PRACTITIONER
Payer: MEDICAID

## 2022-12-01 ENCOUNTER — ANESTHESIA (OUTPATIENT)
Dept: SURGERY | Facility: HOSPITAL | Age: 62
End: 2022-12-01
Payer: MEDICAID

## 2022-12-01 ENCOUNTER — HOSPITAL ENCOUNTER (OUTPATIENT)
Facility: HOSPITAL | Age: 62
Discharge: HOME HEALTH | End: 2022-12-02
Attending: ORTHOPAEDIC SURGERY | Admitting: ORTHOPAEDIC SURGERY
Payer: MEDICAID

## 2022-12-01 ENCOUNTER — ANESTHESIA EVENT (OUTPATIENT)
Dept: SURGERY | Facility: HOSPITAL | Age: 62
End: 2022-12-01
Payer: MEDICAID

## 2022-12-01 DIAGNOSIS — Z01.818 PRE-OP EXAM: Primary | ICD-10-CM

## 2022-12-01 DIAGNOSIS — Z01.818 PRE-OP TESTING: ICD-10-CM

## 2022-12-01 LAB
ANTIBODY SCREEN: NEGATIVE
EST. AVERAGE GLUCOSE BLD GHB EST-MCNC: 120 MG/DL (ref 68–126)
GLUCOSE BLD-MCNC: 101 MG/DL (ref 70–99)
GLUCOSE BLD-MCNC: 131 MG/DL (ref 70–99)
GLUCOSE BLD-MCNC: 82 MG/DL (ref 70–99)
GLUCOSE BLD-MCNC: 92 MG/DL (ref 70–99)
HBA1C MFR BLD: 5.8 % (ref ?–5.7)
RH BLOOD TYPE: NEGATIVE

## 2022-12-01 PROCEDURE — 86901 BLOOD TYPING SEROLOGIC RH(D): CPT | Performed by: ORTHOPAEDIC SURGERY

## 2022-12-01 PROCEDURE — 0SRD0J9 REPLACEMENT OF LEFT KNEE JOINT WITH SYNTHETIC SUBSTITUTE, CEMENTED, OPEN APPROACH: ICD-10-PCS | Performed by: ORTHOPAEDIC SURGERY

## 2022-12-01 PROCEDURE — 73560 X-RAY EXAM OF KNEE 1 OR 2: CPT | Performed by: NURSE PRACTITIONER

## 2022-12-01 PROCEDURE — 76942 ECHO GUIDE FOR BIOPSY: CPT | Performed by: ANESTHESIOLOGY

## 2022-12-01 PROCEDURE — 82962 GLUCOSE BLOOD TEST: CPT

## 2022-12-01 PROCEDURE — 86850 RBC ANTIBODY SCREEN: CPT | Performed by: ORTHOPAEDIC SURGERY

## 2022-12-01 PROCEDURE — 83036 HEMOGLOBIN GLYCOSYLATED A1C: CPT | Performed by: HOSPITALIST

## 2022-12-01 PROCEDURE — 86900 BLOOD TYPING SEROLOGIC ABO: CPT | Performed by: ORTHOPAEDIC SURGERY

## 2022-12-01 DEVICE — ATTUNE PATELLA MEDIALIZED ANATOMIC 32MM CEMENTED AOX
Type: IMPLANTABLE DEVICE | Site: KNEE | Status: FUNCTIONAL
Brand: ATTUNE

## 2022-12-01 DEVICE — SMARTSET GHV GENTAMICIN HIGH VISCOSITY BONE CEMENT 40G
Type: IMPLANTABLE DEVICE | Site: KNEE | Status: FUNCTIONAL
Brand: SMARTSET

## 2022-12-01 DEVICE — ATTUNE KNEE SYSTEM FEMORAL POSTERIOR STABILIZED SIZE 6 LEFT CEMENTED
Type: IMPLANTABLE DEVICE | Site: KNEE | Status: FUNCTIONAL
Brand: ATTUNE

## 2022-12-01 DEVICE — ATTUNE KNEE SYSTEM TIBIAL BASE ROTATING PLATFORM SIZE 6 CEMENTED
Type: IMPLANTABLE DEVICE | Site: KNEE | Status: FUNCTIONAL
Brand: ATTUNE

## 2022-12-01 DEVICE — ATTUNE KNEE SYSTEM TIBIAL INSERT ROTATING PLATFORM POSTERIOR STABILIZED 6 6MM AOX
Type: IMPLANTABLE DEVICE | Site: KNEE | Status: FUNCTIONAL
Brand: ATTUNE

## 2022-12-01 RX ORDER — BUSPIRONE HYDROCHLORIDE 10 MG/1
TABLET ORAL 3 TIMES DAILY PRN
COMMUNITY

## 2022-12-01 RX ORDER — HYDROMORPHONE HYDROCHLORIDE 1 MG/ML
0.8 INJECTION, SOLUTION INTRAMUSCULAR; INTRAVENOUS; SUBCUTANEOUS EVERY 2 HOUR PRN
Status: DISCONTINUED | OUTPATIENT
Start: 2022-12-01 | End: 2022-12-02

## 2022-12-01 RX ORDER — TRIAMTERENE AND HYDROCHLOROTHIAZIDE 37.5; 25 MG/1; MG/1
1 CAPSULE ORAL EVERY MORNING
COMMUNITY

## 2022-12-01 RX ORDER — SODIUM CHLORIDE, SODIUM LACTATE, POTASSIUM CHLORIDE, CALCIUM CHLORIDE 600; 310; 30; 20 MG/100ML; MG/100ML; MG/100ML; MG/100ML
INJECTION, SOLUTION INTRAVENOUS CONTINUOUS
Status: DISCONTINUED | OUTPATIENT
Start: 2022-12-01 | End: 2022-12-02

## 2022-12-01 RX ORDER — DIPHENHYDRAMINE HYDROCHLORIDE 50 MG/ML
12.5 INJECTION INTRAMUSCULAR; INTRAVENOUS EVERY 4 HOURS PRN
Status: DISCONTINUED | OUTPATIENT
Start: 2022-12-01 | End: 2022-12-02

## 2022-12-01 RX ORDER — METOPROLOL SUCCINATE 25 MG/1
25 TABLET, EXTENDED RELEASE ORAL
Status: DISCONTINUED | OUTPATIENT
Start: 2022-12-02 | End: 2022-12-02

## 2022-12-01 RX ORDER — HYDROMORPHONE HYDROCHLORIDE 1 MG/ML
0.4 INJECTION, SOLUTION INTRAMUSCULAR; INTRAVENOUS; SUBCUTANEOUS EVERY 2 HOUR PRN
Status: DISCONTINUED | OUTPATIENT
Start: 2022-12-01 | End: 2022-12-02

## 2022-12-01 RX ORDER — HYDROMORPHONE HYDROCHLORIDE 1 MG/ML
0.4 INJECTION, SOLUTION INTRAMUSCULAR; INTRAVENOUS; SUBCUTANEOUS EVERY 5 MIN PRN
Status: DISCONTINUED | OUTPATIENT
Start: 2022-12-01 | End: 2022-12-01 | Stop reason: HOSPADM

## 2022-12-01 RX ORDER — TRAMADOL HYDROCHLORIDE 50 MG/1
50 TABLET ORAL EVERY 6 HOURS SCHEDULED
Status: DISCONTINUED | OUTPATIENT
Start: 2022-12-01 | End: 2022-12-01

## 2022-12-01 RX ORDER — CEFAZOLIN SODIUM/WATER 2 G/20 ML
2 SYRINGE (ML) INTRAVENOUS EVERY 8 HOURS
Status: COMPLETED | OUTPATIENT
Start: 2022-12-01 | End: 2022-12-02

## 2022-12-01 RX ORDER — BUPIVACAINE HYDROCHLORIDE 7.5 MG/ML
INJECTION, SOLUTION INTRASPINAL AS NEEDED
Status: DISCONTINUED | OUTPATIENT
Start: 2022-12-01 | End: 2022-12-01 | Stop reason: SURG

## 2022-12-01 RX ORDER — ARIPIPRAZOLE 5 MG/1
5 TABLET ORAL NIGHTLY
Status: DISCONTINUED | OUTPATIENT
Start: 2022-12-01 | End: 2022-12-02

## 2022-12-01 RX ORDER — ASPIRIN 325 MG
325 TABLET ORAL 2 TIMES DAILY
Status: DISCONTINUED | OUTPATIENT
Start: 2022-12-01 | End: 2022-12-02

## 2022-12-01 RX ORDER — TRANEXAMIC ACID 10 MG/ML
1000 INJECTION, SOLUTION INTRAVENOUS ONCE
Status: COMPLETED | OUTPATIENT
Start: 2022-12-01 | End: 2022-12-01

## 2022-12-01 RX ORDER — ROPIVACAINE HYDROCHLORIDE 5 MG/ML
INJECTION, SOLUTION EPIDURAL; INFILTRATION; PERINEURAL AS NEEDED
Status: DISCONTINUED | OUTPATIENT
Start: 2022-12-01 | End: 2022-12-01 | Stop reason: SURG

## 2022-12-01 RX ORDER — DOCUSATE SODIUM 100 MG/1
100 CAPSULE, LIQUID FILLED ORAL 2 TIMES DAILY
Status: DISCONTINUED | OUTPATIENT
Start: 2022-12-01 | End: 2022-12-02

## 2022-12-01 RX ORDER — SODIUM PHOSPHATE, DIBASIC AND SODIUM PHOSPHATE, MONOBASIC 7; 19 G/133ML; G/133ML
1 ENEMA RECTAL ONCE AS NEEDED
Status: DISCONTINUED | OUTPATIENT
Start: 2022-12-01 | End: 2022-12-02

## 2022-12-01 RX ORDER — TRAMADOL HYDROCHLORIDE 50 MG/1
50 TABLET ORAL EVERY 12 HOURS SCHEDULED
Status: DISCONTINUED | OUTPATIENT
Start: 2022-12-01 | End: 2022-12-02

## 2022-12-01 RX ORDER — DEXTROSE MONOHYDRATE 25 G/50ML
50 INJECTION, SOLUTION INTRAVENOUS
Status: DISCONTINUED | OUTPATIENT
Start: 2022-12-01 | End: 2022-12-01 | Stop reason: HOSPADM

## 2022-12-01 RX ORDER — BUSPIRONE HYDROCHLORIDE 5 MG/1
10 TABLET ORAL 3 TIMES DAILY PRN
Status: DISCONTINUED | OUTPATIENT
Start: 2022-12-01 | End: 2022-12-02

## 2022-12-01 RX ORDER — NICOTINE POLACRILEX 4 MG
15 LOZENGE BUCCAL
Status: DISCONTINUED | OUTPATIENT
Start: 2022-12-01 | End: 2022-12-02

## 2022-12-01 RX ORDER — ACETAMINOPHEN 325 MG/1
TABLET ORAL
Status: COMPLETED
Start: 2022-12-01 | End: 2022-12-01

## 2022-12-01 RX ORDER — MELATONIN
325
Status: DISCONTINUED | OUTPATIENT
Start: 2022-12-02 | End: 2022-12-02

## 2022-12-01 RX ORDER — PROCHLORPERAZINE EDISYLATE 5 MG/ML
5 INJECTION INTRAMUSCULAR; INTRAVENOUS EVERY 8 HOURS PRN
Status: DISCONTINUED | OUTPATIENT
Start: 2022-12-01 | End: 2022-12-02

## 2022-12-01 RX ORDER — BISACODYL 10 MG
10 SUPPOSITORY, RECTAL RECTAL
Status: DISCONTINUED | OUTPATIENT
Start: 2022-12-01 | End: 2022-12-02

## 2022-12-01 RX ORDER — NICOTINE POLACRILEX 4 MG
30 LOZENGE BUCCAL
Status: DISCONTINUED | OUTPATIENT
Start: 2022-12-01 | End: 2022-12-01 | Stop reason: HOSPADM

## 2022-12-01 RX ORDER — POLYETHYLENE GLYCOL 3350 17 G/17G
17 POWDER, FOR SOLUTION ORAL DAILY PRN
Status: DISCONTINUED | OUTPATIENT
Start: 2022-12-01 | End: 2022-12-02

## 2022-12-01 RX ORDER — ONDANSETRON 2 MG/ML
4 INJECTION INTRAMUSCULAR; INTRAVENOUS EVERY 6 HOURS PRN
Status: DISCONTINUED | OUTPATIENT
Start: 2022-12-01 | End: 2022-12-02

## 2022-12-01 RX ORDER — ACETAMINOPHEN 325 MG/1
650 TABLET ORAL 4 TIMES DAILY
Status: DISCONTINUED | OUTPATIENT
Start: 2022-12-01 | End: 2022-12-02

## 2022-12-01 RX ORDER — DIPHENHYDRAMINE HCL 25 MG
25 CAPSULE ORAL EVERY 4 HOURS PRN
Status: DISCONTINUED | OUTPATIENT
Start: 2022-12-01 | End: 2022-12-02

## 2022-12-01 RX ORDER — BUSPIRONE HYDROCHLORIDE 5 MG/1
TABLET ORAL 3 TIMES DAILY PRN
Status: DISCONTINUED | OUTPATIENT
Start: 2022-12-01 | End: 2022-12-01

## 2022-12-01 RX ORDER — NALOXONE HYDROCHLORIDE 0.4 MG/ML
80 INJECTION, SOLUTION INTRAMUSCULAR; INTRAVENOUS; SUBCUTANEOUS AS NEEDED
Status: DISCONTINUED | OUTPATIENT
Start: 2022-12-01 | End: 2022-12-01 | Stop reason: HOSPADM

## 2022-12-01 RX ORDER — PREGABALIN 50 MG/1
50 CAPSULE ORAL 3 TIMES DAILY
Status: DISCONTINUED | OUTPATIENT
Start: 2022-12-01 | End: 2022-12-02

## 2022-12-01 RX ORDER — ASPIRIN 325 MG
325 TABLET ORAL 2 TIMES DAILY
COMMUNITY
Start: 2022-11-22

## 2022-12-01 RX ORDER — HYDROMORPHONE HYDROCHLORIDE 1 MG/ML
0.6 INJECTION, SOLUTION INTRAMUSCULAR; INTRAVENOUS; SUBCUTANEOUS EVERY 5 MIN PRN
Status: DISCONTINUED | OUTPATIENT
Start: 2022-12-01 | End: 2022-12-01 | Stop reason: HOSPADM

## 2022-12-01 RX ORDER — HYDROMORPHONE HYDROCHLORIDE 1 MG/ML
0.2 INJECTION, SOLUTION INTRAMUSCULAR; INTRAVENOUS; SUBCUTANEOUS EVERY 5 MIN PRN
Status: DISCONTINUED | OUTPATIENT
Start: 2022-12-01 | End: 2022-12-01 | Stop reason: HOSPADM

## 2022-12-01 RX ORDER — DIPHENHYDRAMINE HYDROCHLORIDE 50 MG/ML
25 INJECTION INTRAMUSCULAR; INTRAVENOUS ONCE AS NEEDED
Status: ACTIVE | OUTPATIENT
Start: 2022-12-01 | End: 2022-12-01

## 2022-12-01 RX ORDER — NICOTINE POLACRILEX 4 MG
30 LOZENGE BUCCAL
Status: DISCONTINUED | OUTPATIENT
Start: 2022-12-01 | End: 2022-12-02

## 2022-12-01 RX ORDER — SODIUM CHLORIDE, SODIUM LACTATE, POTASSIUM CHLORIDE, CALCIUM CHLORIDE 600; 310; 30; 20 MG/100ML; MG/100ML; MG/100ML; MG/100ML
INJECTION, SOLUTION INTRAVENOUS CONTINUOUS
Status: DISCONTINUED | OUTPATIENT
Start: 2022-12-01 | End: 2022-12-01 | Stop reason: HOSPADM

## 2022-12-01 RX ORDER — CELECOXIB 200 MG/1
200 CAPSULE ORAL DAILY
Qty: 30 CAPSULE | Refills: 2 | Status: SHIPPED | COMMUNITY
Start: 2022-12-01

## 2022-12-01 RX ORDER — SODIUM CHLORIDE 9 MG/ML
INJECTION, SOLUTION INTRAVENOUS CONTINUOUS
Status: DISCONTINUED | OUTPATIENT
Start: 2022-12-01 | End: 2022-12-02

## 2022-12-01 RX ORDER — MIDAZOLAM HYDROCHLORIDE 1 MG/ML
INJECTION INTRAMUSCULAR; INTRAVENOUS AS NEEDED
Status: DISCONTINUED | OUTPATIENT
Start: 2022-12-01 | End: 2022-12-01 | Stop reason: SURG

## 2022-12-01 RX ORDER — PSEUDOEPHEDRINE HCL 30 MG
100 TABLET ORAL 2 TIMES DAILY
COMMUNITY
Start: 2022-11-22

## 2022-12-01 RX ORDER — SENNOSIDES 8.6 MG
17.2 TABLET ORAL NIGHTLY
Status: DISCONTINUED | OUTPATIENT
Start: 2022-12-01 | End: 2022-12-02

## 2022-12-01 RX ORDER — HYDROCODONE BITARTRATE AND ACETAMINOPHEN 5; 325 MG/1; MG/1
2 TABLET ORAL ONCE AS NEEDED
Status: DISCONTINUED | OUTPATIENT
Start: 2022-12-01 | End: 2022-12-01 | Stop reason: HOSPADM

## 2022-12-01 RX ORDER — LIDOCAINE HYDROCHLORIDE 20 MG/ML
325 SOLUTION ORAL; TOPICAL DAILY
COMMUNITY

## 2022-12-01 RX ORDER — KETOROLAC TROMETHAMINE 15 MG/ML
15 INJECTION, SOLUTION INTRAMUSCULAR; INTRAVENOUS EVERY 6 HOURS
Status: COMPLETED | OUTPATIENT
Start: 2022-12-01 | End: 2022-12-02

## 2022-12-01 RX ORDER — VENLAFAXINE HYDROCHLORIDE 75 MG/1
225 CAPSULE, EXTENDED RELEASE ORAL NIGHTLY
Status: DISCONTINUED | OUTPATIENT
Start: 2022-12-01 | End: 2022-12-02

## 2022-12-01 RX ORDER — ACETAMINOPHEN 500 MG
1000 TABLET ORAL ONCE AS NEEDED
Status: DISCONTINUED | OUTPATIENT
Start: 2022-12-01 | End: 2022-12-01 | Stop reason: HOSPADM

## 2022-12-01 RX ORDER — KETOROLAC TROMETHAMINE 30 MG/ML
30 INJECTION, SOLUTION INTRAMUSCULAR; INTRAVENOUS EVERY 6 HOURS
Status: DISCONTINUED | OUTPATIENT
Start: 2022-12-01 | End: 2022-12-01

## 2022-12-01 RX ORDER — TIZANIDINE 2 MG/1
2 TABLET ORAL EVERY 6 HOURS PRN
Status: DISCONTINUED | OUTPATIENT
Start: 2022-12-01 | End: 2022-12-01

## 2022-12-01 RX ORDER — DEXTROSE MONOHYDRATE 25 G/50ML
50 INJECTION, SOLUTION INTRAVENOUS
Status: DISCONTINUED | OUTPATIENT
Start: 2022-12-01 | End: 2022-12-02

## 2022-12-01 RX ORDER — OXYCODONE HYDROCHLORIDE 10 MG/1
10 TABLET ORAL EVERY 4 HOURS PRN
Status: DISCONTINUED | OUTPATIENT
Start: 2022-12-01 | End: 2022-12-02

## 2022-12-01 RX ORDER — ACETAMINOPHEN 325 MG/1
650 TABLET ORAL 4 TIMES DAILY
Status: DISCONTINUED | OUTPATIENT
Start: 2022-12-01 | End: 2022-12-01

## 2022-12-01 RX ORDER — ATORVASTATIN CALCIUM 40 MG/1
40 TABLET, FILM COATED ORAL NIGHTLY
Status: DISCONTINUED | OUTPATIENT
Start: 2022-12-01 | End: 2022-12-02

## 2022-12-01 RX ORDER — HYDROCODONE BITARTRATE AND ACETAMINOPHEN 10; 325 MG/1; MG/1
1-2 TABLET ORAL EVERY 4 HOURS PRN
COMMUNITY
Start: 2022-06-14

## 2022-12-01 RX ORDER — NICOTINE POLACRILEX 4 MG
15 LOZENGE BUCCAL
Status: DISCONTINUED | OUTPATIENT
Start: 2022-12-01 | End: 2022-12-01 | Stop reason: HOSPADM

## 2022-12-01 RX ORDER — HYDROCODONE BITARTRATE AND ACETAMINOPHEN 5; 325 MG/1; MG/1
1 TABLET ORAL ONCE AS NEEDED
Status: DISCONTINUED | OUTPATIENT
Start: 2022-12-01 | End: 2022-12-01 | Stop reason: HOSPADM

## 2022-12-01 RX ORDER — DEXAMETHASONE SODIUM PHOSPHATE 10 MG/ML
8 INJECTION, SOLUTION INTRAMUSCULAR; INTRAVENOUS ONCE
Status: DISCONTINUED | OUTPATIENT
Start: 2022-12-02 | End: 2022-12-01

## 2022-12-01 RX ORDER — DEXAMETHASONE SODIUM PHOSPHATE 10 MG/ML
8 INJECTION, SOLUTION INTRAMUSCULAR; INTRAVENOUS ONCE
Status: COMPLETED | OUTPATIENT
Start: 2022-12-02 | End: 2022-12-02

## 2022-12-01 RX ORDER — PANTOPRAZOLE SODIUM 40 MG/1
40 TABLET, DELAYED RELEASE ORAL
Status: DISCONTINUED | OUTPATIENT
Start: 2022-12-02 | End: 2022-12-02

## 2022-12-01 RX ORDER — SCOLOPAMINE TRANSDERMAL SYSTEM 1 MG/1
1 PATCH, EXTENDED RELEASE TRANSDERMAL ONCE
Status: DISCONTINUED | OUTPATIENT
Start: 2022-12-01 | End: 2022-12-01 | Stop reason: HOSPADM

## 2022-12-01 RX ORDER — CEFAZOLIN SODIUM/WATER 2 G/20 ML
2 SYRINGE (ML) INTRAVENOUS ONCE
Status: COMPLETED | OUTPATIENT
Start: 2022-12-01 | End: 2022-12-01

## 2022-12-01 RX ORDER — ACETAMINOPHEN 325 MG/1
650 TABLET ORAL ONCE
Status: COMPLETED | OUTPATIENT
Start: 2022-12-01 | End: 2022-12-01

## 2022-12-01 RX ORDER — TIZANIDINE 2 MG/1
2 TABLET ORAL EVERY 6 HOURS PRN
Status: DISCONTINUED | OUTPATIENT
Start: 2022-12-01 | End: 2022-12-02

## 2022-12-01 RX ORDER — ACETAMINOPHEN 500 MG
1000 TABLET ORAL ONCE
Status: DISCONTINUED | OUTPATIENT
Start: 2022-12-01 | End: 2022-12-01 | Stop reason: HOSPADM

## 2022-12-01 RX ORDER — OXYCODONE HYDROCHLORIDE 5 MG/1
5 TABLET ORAL EVERY 4 HOURS PRN
Status: DISCONTINUED | OUTPATIENT
Start: 2022-12-01 | End: 2022-12-02

## 2022-12-01 RX ADMIN — ROPIVACAINE HYDROCHLORIDE 20 ML: 5 INJECTION, SOLUTION EPIDURAL; INFILTRATION; PERINEURAL at 11:59:00

## 2022-12-01 RX ADMIN — MIDAZOLAM HYDROCHLORIDE 2 MG: 1 INJECTION INTRAMUSCULAR; INTRAVENOUS at 11:47:00

## 2022-12-01 RX ADMIN — TRANEXAMIC ACID 1000 MG: 10 INJECTION, SOLUTION INTRAVENOUS at 12:04:00

## 2022-12-01 RX ADMIN — SODIUM CHLORIDE, SODIUM LACTATE, POTASSIUM CHLORIDE, CALCIUM CHLORIDE: 600; 310; 30; 20 INJECTION, SOLUTION INTRAVENOUS at 13:35:00

## 2022-12-01 RX ADMIN — CEFAZOLIN SODIUM/WATER 2 G: 2 G/20 ML SYRINGE (ML) INTRAVENOUS at 12:04:00

## 2022-12-01 RX ADMIN — BUPIVACAINE HYDROCHLORIDE 1.4 ML: 7.5 INJECTION, SOLUTION INTRASPINAL at 11:55:00

## 2022-12-01 NOTE — ANESTHESIA PROCEDURE NOTES
Regional Block    Date/Time: 12/1/2022 11:56 AM  Performed by: Max Cole DO  Authorized by: Max Cole DO       General Information and Staff    Start Time:  12/1/2022 11:56 AM  End Time:  12/1/2022 11:59 AM  Anesthesiologist:  Max Cole DO  Performed by: Anesthesiologist  Patient Location:  OR      Site Identification: real time ultrasound guided and image stored and retrievable    Block site/laterality marked before start: site marked  Reason for Block: at surgeon's request and post-op pain management    Preanesthetic Checklist: 2 patient identifers, IV checked, risks and benefits discussed, monitors and equipment checked, pre-op evaluation, timeout performed, anesthesia consent, sterile technique used, no prohibitive neurological deficits and no local skin infection at insertion site      Procedure Details    Patient Position:  Supine  Prep: ChloraPrep    Monitoring:  Cardiac monitor, continuous pulse ox and blood pressure cuff  Block Type: Adductor canal  Laterality:  Left  Injection Technique:  Single-shot    Needle    Needle Type:  Short-bevel and echogenic  Needle Gauge:  21 G  Needle Length:  100 mm  Needle Localization:  Ultrasound guidance  Reason for Ultrasound Use: appropriate spread of the medication was noted in real time and no ultrasound evidence of intravascular and/or intraneural injection            Assessment    Injection Assessment:  Good spread noted, negative resistance, negative aspiration for heme, incremental injection and low pressure  Heart Rate Change: No    - Patient tolerated block procedure well without evidence of immediate block related complications.      Medications  12/1/2022 11:56 AM      Additional Comments    Medication:  Ropivacaine 0.5% 20mL

## 2022-12-01 NOTE — PHYSICAL THERAPY NOTE
PT order received, chart reviewed, eval att'd - pt reporting pain 8-9, will re-attempt tomorrow as appropriate.

## 2022-12-01 NOTE — INTERVAL H&P NOTE
Pre-op Diagnosis: OSTEOARTHRITIS LEFT KNEE    The above referenced H&P was reviewed by Amy Calabrese MD on 12/1/2022, the patient was examined and no significant changes have occurred in the patient's condition since the H&P was performed. I discussed with the patient and/or legal representative the potential benefits, risks and side effects of this procedure; the likelihood of the patient achieving goals; and potential problems that might occur during recuperation. I discussed reasonable alternatives to the procedure, including risks, benefits and side effects related to the alternatives and risks related to not receiving this procedure. We will proceed with procedure as planned.

## 2022-12-01 NOTE — BRIEF OP NOTE
Pre-Operative Diagnosis: OSTEOARTHRITIS LEFT KNEE     Post-Operative Diagnosis: * No post-op diagnosis entered *      Procedure Performed:   LEFT TOTAL KNEE ARTHROPLASTY    Surgeon(s) and Role:     * Gacria Pichardo MD - Primary    Assistant(s):  Surgical Assistant.: Ophelia ONOFREN: DENISE Quiñonez     Surgical Findings: OA     Specimen: bone     Estimated Blood Loss: Blood Output: 20 mL (12/1/2022  1:11 PM)      Dictation Number:  Clayton Frances MD  12/1/2022  1:46 PM

## 2022-12-01 NOTE — ANESTHESIA PROCEDURE NOTES
Spinal Block    Date/Time: 12/1/2022 11:51 AM  Performed by: Nadira Malcolm DO  Authorized by: Nadira Malcolm DO       General Information and Staff    Start Time:  12/1/2022 11:51 AM  End Time:  12/1/2022 11:55 AM  Anesthesiologist:  Nadira Malcolm DO  Performed by:   Anesthesiologist  Patient Location:  OR  Site identification: surface landmarks    Preanesthetic Checklist: patient identified, IV checked, risks and benefits discussed, monitors and equipment checked, pre-op evaluation, timeout performed, anesthesia consent and sterile technique used      Procedure Details    Patient Position:  Sitting  Prep: ChloraPrep    Monitoring:  Cardiac monitor, heart rate and continuous pulse ox  Approach:  Midline  Location:  L3-4  Injection Technique:  Single-shot    Needle    Needle Type:  Sprotte  Needle Gauge:  24 G  Needle Length:  3.5 in    Assessment    Sensory Level:   Events: clear CSF, CSF aspirated, well tolerated and blood negative      Additional Comments

## 2022-12-01 NOTE — OPERATIVE REPORT
PATIENT'S NAME: Santi Batista   ATTENDING PHYSICIAN: Ledy Grimes MD   OPERATING PHYSICIAN: Ledy Grimes MD   PATIENT ACCOUNT#:   [de-identified]    LOCATION:  [address] [place] [address] RECORD #:   [id number]    YOB: 1960  ADMISSION DATE:  12/1/2022           OPERATION DATE:  12/1/2022     OPERATIVE REPORT     PREOPERATIVE DIAGNOSIS:  Left knee primary osteoarthritis. POSTOPERATIVE DIAGNOSIS: Left knee primary osteoarthritis. PROCEDURE PERFORMED:  Left total knee arthroplasty, kinematic alignment, Attune 6 cemented femur, cemented size 6 tibia, 6 mm rotating polyethylene, and cemented size 32 all-polyethylene patella. ASSISTANT:  Jacob Barry    Skilled assistance was needed for patient positioning, prepping and draping, instrument holding and passing, retracting and suturing. ANESTHESIA:  Spinal with regional block. INDICATIONS:  This is a 57 yrs old Sheri female who presents with ongoing pain and difficulties with the knee consistent with the above diagnosis with failed conservative care. I reviewed the indications and benefits of the procedure. The patient understood and agreed to proceed. PROCEDURE:  The patient was taken to the operating room and placed in the supine position after administration of spinal anesthesia. A regional block was performed at the lower extremity. A tourniquet was placed at the thigh, which later was inflated to 300 mmHg. A time-out was performed confirming the left knee as the appropriate operative site. The patient received IV antibiotics preoperatively. The knee was prepped and draped in the usual sterile fashion. A knife was used to make a longitudinal incision at the anterior aspect of the knee. The Bovie was used for subcutaneous dissection and hemostasis.   Dissection was carried down to the extensor mechanism, which was incised in line with the quadriceps tendon, carried medial to patella and medial to patellar tendon. Upon entering the joint, normal joint fluid was produced. Soft tissue dissection was carried out and osteophytes were removed until the joint was adequately visualized. Irrisept irrigation was placed in the wound and allowed to sit for 1 minute before evacuating the solution. The knee was then flexed and the drill used to gain intramedullary access to the femur. The intramedullary roxane with the attached cutting guide was set to the appropriate valgus to accommodate the patient's femur per the preoperative templating, the guide was pinned in place and the distal femoral cut made. The caliper was used to confirm appropriate resection of the distal femur. Attention was turned to the tibia which was subluxed anteriorly. After adequate exposure, the extra medullary tibial guide was placed anterior to the leg. The posterior slope was determined, a pin was placed in the sliding hole of the guide. The alignment of the proximal tibia was then set, based on the preoperative templating, and the guide was pinned in place. The saw was then used to resect the proximal tibia, the caliper was used to evaluate the resection. The lollipop was then used to confirm extension gap at 6 mm. The sizing guide was then placed to the distal femur. The size was determined to be a 6, and the drill pins placed. The cutting guide was placed and pinned in position. The resection at the posterior femoral condyles was confirmed to be appropriate. The anterior and posterior femur cuts were made. The lollipop was used to confirm flexion gap was equal to the extension gap before finishing the chamfer cuts and removing the guide. The finishing guide was then placed and pinned in position. The notch cut was made before removing the guide. Attention was turned to the posterior aspect where the osteophytes were removed and soft tissue was released before turning attention back to the tibia.   The size 6 tibial trial was found to be an appropriate fit with good coverage and no overhang. The guide was pinned in place with the tower. The reamer was used followed by the punch, which was left in place for trialing. The femur was impacted in place, the trial polyethylene was placed. The  knee was brought into full extension with good flexion and good ligament balance throughout. Attention was turned to the patella. The caliper was used to measure initial thickness before using the saw in a freehand technique to resect the articular surface of the patella. A size 32 was the appropriate fit and this was placed. Drill holes were made for the patella, the trial was then placed and tracked nicely with the trial femur. The drill holes were then made for the trial femur before removing all trial implants. The bone was prepared for cementing with pulse lavage irrigation. A bone plug was placed to the distal femur. The final implants were opened on the back table and the cement was mixed. When the cement was ready, it was finger-packed into position at the proximal tibia before placing the cement-coated tibial implant, impacting it in place, and removing excess cement. The femur was placed with cement in the same fashion before placing the final polyethylene and bringing the knee into extension, placing the foot on the Grewal to compress the implants while the cement hardened. The patella was placed in the same fashion with cement. A clamp was used to hold it in place while the cement hardened. The knee was then copiously irrigated with pulse lavage irrigation before placing Irrisept into the wound and allowing it to sit for 1 minute before evacuating the solution. When the cement had hardened, the knee was flexed to confirm no further cement needed to be removed. The knee was put through good range of motion with good ligament balance and good patellar tracking. Closure was then performed.   A Quill suture was used in a running fashion to close the extensor mechanism. A #2 Orthocord suture was used in a figure-of-eight fashion at the medial parapatellar region to reinforce the repair. When the repair was complete, the knee was flexed to confirm stable repair. Subcutaneous tissue was then closed with inverted 2-0 Vicryl suture and the skin was closed with staples. A sterile dressing was placed. The patient tolerated the procedure well. There were no complications. Blood loss was approximately 20 mL. Tourniquet time was approximately 75 minutes. The patient was taken to the recovery room in stable condition.

## 2022-12-01 NOTE — PLAN OF CARE
A&Ox4. VSS. On room air. . IS encouraged. SCDs on BLE. Ankle pumps encouraged. Tolerating diet. Last BM this morning. Due to void by 2130. Aquacel and spandagrip to left knee C/D/I, gel ice in place. IVF infusing as ordered. WBAT. Plan is for PT/OT eval. Patient updated on plan of care. Safety precautions in place. Call light within reach. Will continue to monitor. normal (ped)...

## 2022-12-02 VITALS
RESPIRATION RATE: 18 BRPM | TEMPERATURE: 99 F | DIASTOLIC BLOOD PRESSURE: 66 MMHG | OXYGEN SATURATION: 90 % | BODY MASS INDEX: 35.27 KG/M2 | WEIGHT: 238.13 LBS | HEART RATE: 81 BPM | HEIGHT: 69 IN | SYSTOLIC BLOOD PRESSURE: 135 MMHG

## 2022-12-02 LAB
GLUCOSE BLD-MCNC: 120 MG/DL (ref 70–99)
GLUCOSE BLD-MCNC: 132 MG/DL (ref 70–99)
HCT VFR BLD AUTO: 32.3 %
HGB BLD-MCNC: 11 G/DL

## 2022-12-02 PROCEDURE — 97161 PT EVAL LOW COMPLEX 20 MIN: CPT

## 2022-12-02 PROCEDURE — 85014 HEMATOCRIT: CPT | Performed by: NURSE PRACTITIONER

## 2022-12-02 PROCEDURE — 82962 GLUCOSE BLOOD TEST: CPT

## 2022-12-02 PROCEDURE — 85018 HEMOGLOBIN: CPT | Performed by: NURSE PRACTITIONER

## 2022-12-02 PROCEDURE — 97116 GAIT TRAINING THERAPY: CPT

## 2022-12-02 PROCEDURE — 97535 SELF CARE MNGMENT TRAINING: CPT

## 2022-12-02 PROCEDURE — 97110 THERAPEUTIC EXERCISES: CPT

## 2022-12-02 PROCEDURE — 97165 OT EVAL LOW COMPLEX 30 MIN: CPT

## 2022-12-02 PROCEDURE — 97530 THERAPEUTIC ACTIVITIES: CPT

## 2022-12-02 NOTE — PLAN OF CARE
Pt A&O x 4. VSS. 2L O2 beginning of shift. Weaning pt off O2. 92% on RA  Pain is controlled with  PRN pain med. Pt up x 1 assist with walker  Up to chair  Dressing to lt knee with aquacel CDI and gel ice.    QID accuchek  Last BM 12/1/22, Miralax given  Up to bath to void  Cleared by PT for discharge    Plan- Pain control and discharge to [facility]

## 2022-12-02 NOTE — PROGRESS NOTES
AVS reviewed , dc video watched, scripts filled prior to dc , will dc home w/ [facility] later today.

## 2022-12-02 NOTE — CM/SW NOTE
12/02/22 0900   CM/SW Referral Data   Referral Source Social Work (self-referral)   Reason for Referral Discharge planning   Informant EMR;Clinical Staff Member   Discharge Needs   Anticipated D/C needs Home health care     Patient is a 58 y/o woman admitted s/p TKA. Pt with pre-operative plan for [facility] at [facility]. PT eval pending. Phebe Dubin at [facility] [place] confirmed pt accepted for [facility] services at [facility]. No other [facility] needs/concerns identified at this time. / to remain available for support and/or discharge planning.      Alka Connelly [facility]  Discharge Planner  [phone]

## 2022-12-02 NOTE — PLAN OF CARE
A/O VSS on room air. Pain managed with scheduled tylenol, tramadol, toradol and prn oxy. Left knee with aquacel, spandgrip and gel ice. Patient up to [facility] with walker and assist x2. Voiding without difficulty. Plan of care reviewed. Bed alarm on. Call light within reach.

## 2022-12-02 NOTE — PLAN OF CARE
Reviewed discharge instructions and paper work with patient. All questions answered. IV removed. All bedside belongings sent with patient.   Daughter to  at 6pm

## 2023-02-21 ENCOUNTER — OFFICE VISIT (OUTPATIENT)
Dept: FAMILY MEDICINE CLINIC | Facility: CLINIC | Age: 63
End: 2023-02-21
Payer: MEDICAID

## 2023-02-21 VITALS
SYSTOLIC BLOOD PRESSURE: 138 MMHG | DIASTOLIC BLOOD PRESSURE: 78 MMHG | HEART RATE: 87 BPM | OXYGEN SATURATION: 97 % | BODY MASS INDEX: 34.8 KG/M2 | TEMPERATURE: 98 F | RESPIRATION RATE: 18 BRPM | HEIGHT: 69 IN | WEIGHT: 235 LBS

## 2023-02-21 DIAGNOSIS — B34.9 VIRAL ILLNESS: ICD-10-CM

## 2023-02-21 DIAGNOSIS — R05.1 ACUTE COUGH: ICD-10-CM

## 2023-02-21 DIAGNOSIS — J02.9 SORE THROAT: ICD-10-CM

## 2023-02-21 DIAGNOSIS — R68.89 FLU-LIKE SYMPTOMS: Primary | ICD-10-CM

## 2023-02-21 LAB — CONTROL LINE PRESENT WITH A CLEAR BACKGROUND (YES/NO): YES YES/NO

## 2023-02-21 PROCEDURE — 87637 SARSCOV2&INF A&B&RSV AMP PRB: CPT | Performed by: FAMILY MEDICINE

## 2023-02-21 PROCEDURE — 99213 OFFICE O/P EST LOW 20 MIN: CPT | Performed by: FAMILY MEDICINE

## 2023-02-21 PROCEDURE — 3075F SYST BP GE 130 - 139MM HG: CPT | Performed by: FAMILY MEDICINE

## 2023-02-21 PROCEDURE — 3008F BODY MASS INDEX DOCD: CPT | Performed by: FAMILY MEDICINE

## 2023-02-21 PROCEDURE — 3078F DIAST BP <80 MM HG: CPT | Performed by: FAMILY MEDICINE

## 2023-02-21 PROCEDURE — 87880 STREP A ASSAY W/OPTIC: CPT | Performed by: FAMILY MEDICINE

## 2023-02-21 RX ORDER — DEXTROMETHORPHAN HYDROBROMIDE AND PROMETHAZINE HYDROCHLORIDE 15; 6.25 MG/5ML; MG/5ML
5 SYRUP ORAL 4 TIMES DAILY PRN
Qty: 118 ML | Refills: 0 | Status: SHIPPED | OUTPATIENT
Start: 2023-02-21

## 2023-02-21 RX ORDER — AMOXICILLIN AND CLAVULANATE POTASSIUM 875; 125 MG/1; MG/1
1 TABLET, FILM COATED ORAL 2 TIMES DAILY
Qty: 14 TABLET | Refills: 0 | Status: SHIPPED | OUTPATIENT
Start: 2023-02-21 | End: 2023-02-28

## 2023-02-22 LAB
FLUAV + FLUBV RNA SPEC NAA+PROBE: NOT DETECTED
FLUAV + FLUBV RNA SPEC NAA+PROBE: NOT DETECTED
RSV RNA SPEC NAA+PROBE: NOT DETECTED
SARS-COV-2 RNA RESP QL NAA+PROBE: NOT DETECTED

## 2023-03-11 ENCOUNTER — OFFICE VISIT (OUTPATIENT)
Dept: FAMILY MEDICINE CLINIC | Facility: CLINIC | Age: 63
End: 2023-03-11
Payer: MEDICAID

## 2023-03-11 VITALS
HEART RATE: 86 BPM | DIASTOLIC BLOOD PRESSURE: 76 MMHG | WEIGHT: 235 LBS | SYSTOLIC BLOOD PRESSURE: 132 MMHG | RESPIRATION RATE: 16 BRPM | OXYGEN SATURATION: 97 % | HEIGHT: 68 IN | BODY MASS INDEX: 35.61 KG/M2 | TEMPERATURE: 98 F

## 2023-03-11 DIAGNOSIS — R39.9 UTI SYMPTOMS: Primary | ICD-10-CM

## 2023-03-11 DIAGNOSIS — N30.00 ACUTE CYSTITIS WITHOUT HEMATURIA: ICD-10-CM

## 2023-03-11 LAB
BILIRUBIN: NEGATIVE
GLUCOSE (URINE DIPSTICK): NEGATIVE MG/DL
KETONES (URINE DIPSTICK): NEGATIVE MG/DL
MULTISTIX LOT#: ABNORMAL NUMERIC
NITRITE, URINE: NEGATIVE
PH, URINE: 5.5 (ref 4.5–8)
PROTEIN (URINE DIPSTICK): NEGATIVE MG/DL
SPECIFIC GRAVITY: 1.01 (ref 1–1.03)
UROBILINOGEN,SEMI-QN: 0.2 MG/DL (ref 0–1.9)

## 2023-03-11 PROCEDURE — 99213 OFFICE O/P EST LOW 20 MIN: CPT | Performed by: FAMILY MEDICINE

## 2023-03-11 PROCEDURE — 3078F DIAST BP <80 MM HG: CPT | Performed by: FAMILY MEDICINE

## 2023-03-11 PROCEDURE — 3075F SYST BP GE 130 - 139MM HG: CPT | Performed by: FAMILY MEDICINE

## 2023-03-11 PROCEDURE — 81003 URINALYSIS AUTO W/O SCOPE: CPT | Performed by: FAMILY MEDICINE

## 2023-03-11 PROCEDURE — 87086 URINE CULTURE/COLONY COUNT: CPT | Performed by: FAMILY MEDICINE

## 2023-03-11 PROCEDURE — 3008F BODY MASS INDEX DOCD: CPT | Performed by: FAMILY MEDICINE

## 2023-03-11 RX ORDER — CEPHALEXIN 500 MG/1
500 CAPSULE ORAL 2 TIMES DAILY
Qty: 14 CAPSULE | Refills: 0 | Status: SHIPPED | OUTPATIENT
Start: 2023-03-11 | End: 2023-03-18

## 2023-03-11 NOTE — PATIENT INSTRUCTIONS
Take antibiotics with food and plenty of water. Eat yogurt daily or use probiotics. (Valente Diez is a good example of an OTC probiotic)  Make sure to finish the entire antibiotic treatment. We will send the urine specimen for culture and call you in 2-3 days with results  You may take otc pyridium for urinary discomfort if needed. Increase fluid intake and bladder emptying. Return in 3 days if not better. Call if fever, vomiting, worsening symptoms.

## 2023-03-22 ENCOUNTER — TELEPHONE (OUTPATIENT)
Dept: FAMILY MEDICINE CLINIC | Facility: CLINIC | Age: 63
End: 2023-03-22

## 2023-03-23 NOTE — TELEPHONE ENCOUNTER
Pt called [facility] and left message with  stating that uti sx had returned. Called pt back-- went directly to voicemail. Left message that pt would need to be seen since her prior uti came back negative on culture and if she had recurrent sx, she would need further work-up. Also indicated that the work up may need to  be more in depth than the [facility] can provide. Second message received several hours later stating that pt had called 3 times and had not received a call back. Called pt back and again got voicemail immediately. Apologized for the confusion, but stated that her phone may be blocking our calls. Reiterated the same message as above regarding needing follow-up. Pt called again and  conveyed to pt that calls were likely not going through. Pt denied getting previous voicemails. Pt asked for call back at the end of the day. Attempted to call again and was again sent directly to voicemail. Left message for patient to call back again.

## 2023-03-27 ENCOUNTER — HOSPITAL ENCOUNTER (EMERGENCY)
Facility: HOSPITAL | Age: 63
Discharge: HOME | End: 2023-03-27
Attending: EMERGENCY MEDICINE
Payer: MEDICAID

## 2023-03-27 VITALS
DIASTOLIC BLOOD PRESSURE: 66 MMHG | HEIGHT: 68 IN | TEMPERATURE: 99 F | RESPIRATION RATE: 16 BRPM | WEIGHT: 235 LBS | BODY MASS INDEX: 35.61 KG/M2 | OXYGEN SATURATION: 93 % | HEART RATE: 61 BPM | SYSTOLIC BLOOD PRESSURE: 137 MMHG

## 2023-03-27 DIAGNOSIS — E87.6 HYPOKALEMIA: ICD-10-CM

## 2023-03-27 DIAGNOSIS — N30.90 CYSTITIS: Primary | ICD-10-CM

## 2023-03-27 LAB
ALBUMIN SERPL-MCNC: 3.7 G/DL (ref 3.4–5)
ALBUMIN/GLOB SERPL: 1.2 {RATIO} (ref 1–2)
ALP LIVER SERPL-CCNC: 58 U/L
ALT SERPL-CCNC: 27 U/L
ANION GAP SERPL CALC-SCNC: 2 MMOL/L (ref 0–18)
AST SERPL-CCNC: 9 U/L (ref 15–37)
BASOPHILS # BLD AUTO: 0.07 X10(3) UL (ref 0–0.2)
BASOPHILS NFR BLD AUTO: 0.5 %
BILIRUB SERPL-MCNC: 0.3 MG/DL (ref 0.1–2)
BILIRUB UR QL STRIP.AUTO: NEGATIVE
BUN BLD-MCNC: 20 MG/DL (ref 7–18)
CALCIUM BLD-MCNC: 9.3 MG/DL (ref 8.5–10.1)
CHLORIDE SERPL-SCNC: 106 MMOL/L (ref 98–112)
CO2 SERPL-SCNC: 32 MMOL/L (ref 21–32)
COLOR UR AUTO: YELLOW
CREAT BLD-MCNC: 0.83 MG/DL
EOSINOPHIL # BLD AUTO: 0.11 X10(3) UL (ref 0–0.7)
EOSINOPHIL NFR BLD AUTO: 0.8 %
ERYTHROCYTE [DISTWIDTH] IN BLOOD BY AUTOMATED COUNT: 13.4 %
GFR SERPLBLD BASED ON 1.73 SQ M-ARVRAT: 79 ML/MIN/1.73M2 (ref 60–?)
GLOBULIN PLAS-MCNC: 3.2 G/DL (ref 2.8–4.4)
GLUCOSE BLD-MCNC: 92 MG/DL (ref 70–99)
GLUCOSE UR STRIP.AUTO-MCNC: NEGATIVE MG/DL
HCT VFR BLD AUTO: 38.8 %
HGB BLD-MCNC: 13 G/DL
IMM GRANULOCYTES # BLD AUTO: 0.12 X10(3) UL (ref 0–1)
IMM GRANULOCYTES NFR BLD: 0.9 %
KETONES UR STRIP.AUTO-MCNC: NEGATIVE MG/DL
LIPASE SERPL-CCNC: 48 U/L (ref 13–75)
LYMPHOCYTES # BLD AUTO: 4.41 X10(3) UL (ref 1–4)
LYMPHOCYTES NFR BLD AUTO: 31.5 %
MCH RBC QN AUTO: 29.7 PG (ref 26–34)
MCHC RBC AUTO-ENTMCNC: 33.5 G/DL (ref 31–37)
MCV RBC AUTO: 88.6 FL
MONOCYTES # BLD AUTO: 0.88 X10(3) UL (ref 0.1–1)
MONOCYTES NFR BLD AUTO: 6.3 %
NEUTROPHILS # BLD AUTO: 8.39 X10 (3) UL (ref 1.5–7.7)
NEUTROPHILS # BLD AUTO: 8.39 X10(3) UL (ref 1.5–7.7)
NEUTROPHILS NFR BLD AUTO: 60 %
NITRITE UR QL STRIP.AUTO: NEGATIVE
OSMOLALITY SERPL CALC.SUM OF ELEC: 292 MOSM/KG (ref 275–295)
PH UR STRIP.AUTO: 5 [PH] (ref 5–8)
PLATELET # BLD AUTO: 305 10(3)UL (ref 150–450)
POTASSIUM SERPL-SCNC: 3.3 MMOL/L (ref 3.5–5.1)
PROT SERPL-MCNC: 6.9 G/DL (ref 6.4–8.2)
PROT UR STRIP.AUTO-MCNC: 100 MG/DL
RBC # BLD AUTO: 4.38 X10(6)UL
RBC #/AREA URNS AUTO: >10 /HPF
SODIUM SERPL-SCNC: 140 MMOL/L (ref 136–145)
SP GR UR STRIP.AUTO: 1.03 (ref 1–1.03)
UROBILINOGEN UR STRIP.AUTO-MCNC: 2 MG/DL
WBC # BLD AUTO: 14 X10(3) UL (ref 4–11)
WBC #/AREA URNS AUTO: >50 /HPF

## 2023-03-27 PROCEDURE — 87086 URINE CULTURE/COLONY COUNT: CPT | Performed by: EMERGENCY MEDICINE

## 2023-03-27 PROCEDURE — 99284 EMERGENCY DEPT VISIT MOD MDM: CPT | Performed by: EMERGENCY MEDICINE

## 2023-03-27 PROCEDURE — 83690 ASSAY OF LIPASE: CPT | Performed by: EMERGENCY MEDICINE

## 2023-03-27 PROCEDURE — 85025 COMPLETE CBC W/AUTO DIFF WBC: CPT

## 2023-03-27 PROCEDURE — 85025 COMPLETE CBC W/AUTO DIFF WBC: CPT | Performed by: EMERGENCY MEDICINE

## 2023-03-27 PROCEDURE — 96374 THER/PROPH/DIAG INJ IV PUSH: CPT | Performed by: EMERGENCY MEDICINE

## 2023-03-27 PROCEDURE — 80053 COMPREHEN METABOLIC PANEL: CPT | Performed by: EMERGENCY MEDICINE

## 2023-03-27 PROCEDURE — 81001 URINALYSIS AUTO W/SCOPE: CPT | Performed by: EMERGENCY MEDICINE

## 2023-03-27 PROCEDURE — 80053 COMPREHEN METABOLIC PANEL: CPT

## 2023-03-27 PROCEDURE — 83690 ASSAY OF LIPASE: CPT

## 2023-03-27 RX ORDER — PHENAZOPYRIDINE HYDROCHLORIDE 100 MG/1
100 TABLET, FILM COATED ORAL 3 TIMES DAILY PRN
Qty: 6 TABLET | Refills: 0 | Status: SHIPPED | OUTPATIENT
Start: 2023-03-27 | End: 2023-04-03

## 2023-03-27 RX ORDER — SULFAMETHOXAZOLE AND TRIMETHOPRIM 800; 160 MG/1; MG/1
1 TABLET ORAL 2 TIMES DAILY
Qty: 14 TABLET | Refills: 0 | Status: SHIPPED | OUTPATIENT
Start: 2023-03-27 | End: 2023-04-03

## 2023-03-27 RX ORDER — KETOROLAC TROMETHAMINE 15 MG/ML
15 INJECTION, SOLUTION INTRAMUSCULAR; INTRAVENOUS ONCE
Status: COMPLETED | OUTPATIENT
Start: 2023-03-27 | End: 2023-03-27

## 2023-03-27 RX ORDER — KETOROLAC TROMETHAMINE 15 MG/ML
15 INJECTION, SOLUTION INTRAMUSCULAR; INTRAVENOUS ONCE
Status: CANCELLED | OUTPATIENT
Start: 2023-03-27 | End: 2023-03-27

## 2023-03-27 RX ORDER — PHENAZOPYRIDINE HYDROCHLORIDE 200 MG/1
200 TABLET, FILM COATED ORAL ONCE
Status: CANCELLED | OUTPATIENT
Start: 2023-03-27 | End: 2023-03-27

## 2023-03-27 RX ORDER — PHENAZOPYRIDINE HYDROCHLORIDE 200 MG/1
200 TABLET, FILM COATED ORAL ONCE
Status: COMPLETED | OUTPATIENT
Start: 2023-03-27 | End: 2023-03-27

## 2023-04-13 RX ORDER — CELECOXIB 200 MG/1
200 CAPSULE ORAL DAILY
COMMUNITY

## 2023-04-28 ENCOUNTER — ANESTHESIA EVENT (OUTPATIENT)
Dept: SURGERY | Facility: HOSPITAL | Age: 63
End: 2023-04-28
Payer: MEDICAID

## 2023-04-28 ENCOUNTER — ANESTHESIA (OUTPATIENT)
Dept: SURGERY | Facility: HOSPITAL | Age: 63
End: 2023-04-28
Payer: MEDICAID

## 2023-04-28 ENCOUNTER — HOSPITAL ENCOUNTER (OUTPATIENT)
Facility: HOSPITAL | Age: 63
Setting detail: HOSPITAL OUTPATIENT SURGERY
Discharge: HOME | End: 2023-04-28
Attending: ORTHOPAEDIC SURGERY | Admitting: ORTHOPAEDIC SURGERY
Payer: MEDICAID

## 2023-04-28 VITALS
WEIGHT: 234 LBS | HEART RATE: 73 BPM | BODY MASS INDEX: 35.46 KG/M2 | DIASTOLIC BLOOD PRESSURE: 65 MMHG | TEMPERATURE: 98 F | RESPIRATION RATE: 18 BRPM | SYSTOLIC BLOOD PRESSURE: 124 MMHG | OXYGEN SATURATION: 78 % | HEIGHT: 68 IN

## 2023-04-28 LAB — GLUCOSE BLD-MCNC: 95 MG/DL (ref 70–99)

## 2023-04-28 PROCEDURE — 82962 GLUCOSE BLOOD TEST: CPT

## 2023-04-28 PROCEDURE — 0RNK4ZZ RELEASE LEFT SHOULDER JOINT, PERCUTANEOUS ENDOSCOPIC APPROACH: ICD-10-PCS | Performed by: ORTHOPAEDIC SURGERY

## 2023-04-28 PROCEDURE — 0KN84ZZ RELEASE LEFT UPPER ARM MUSCLE, PERCUTANEOUS ENDOSCOPIC APPROACH: ICD-10-PCS | Performed by: ORTHOPAEDIC SURGERY

## 2023-04-28 PROCEDURE — 76942 ECHO GUIDE FOR BIOPSY: CPT | Performed by: STUDENT IN AN ORGANIZED HEALTH CARE EDUCATION/TRAINING PROGRAM

## 2023-04-28 RX ORDER — MIDAZOLAM HYDROCHLORIDE 1 MG/ML
INJECTION INTRAMUSCULAR; INTRAVENOUS AS NEEDED
Status: DISCONTINUED | OUTPATIENT
Start: 2023-04-28 | End: 2023-04-29 | Stop reason: SURG

## 2023-04-28 RX ORDER — DEXAMETHASONE SODIUM PHOSPHATE 4 MG/ML
VIAL (ML) INJECTION AS NEEDED
Status: DISCONTINUED | OUTPATIENT
Start: 2023-04-28 | End: 2023-04-29 | Stop reason: SURG

## 2023-04-28 RX ORDER — KETOROLAC TROMETHAMINE 30 MG/ML
INJECTION, SOLUTION INTRAMUSCULAR; INTRAVENOUS AS NEEDED
Status: DISCONTINUED | OUTPATIENT
Start: 2023-04-28 | End: 2023-04-29 | Stop reason: SURG

## 2023-04-28 RX ORDER — CEFAZOLIN SODIUM/WATER 2 G/20 ML
SYRINGE (ML) INTRAVENOUS
Status: DISCONTINUED
Start: 2023-04-28 | End: 2023-04-28

## 2023-04-28 RX ORDER — METOCLOPRAMIDE HYDROCHLORIDE 5 MG/ML
INJECTION INTRAMUSCULAR; INTRAVENOUS AS NEEDED
Status: DISCONTINUED | OUTPATIENT
Start: 2023-04-28 | End: 2023-04-29 | Stop reason: SURG

## 2023-04-28 RX ORDER — HYDROCODONE BITARTRATE AND ACETAMINOPHEN 5; 325 MG/1; MG/1
1 TABLET ORAL ONCE AS NEEDED
Status: DISCONTINUED | OUTPATIENT
Start: 2023-04-28 | End: 2023-04-28

## 2023-04-28 RX ORDER — PROCHLORPERAZINE EDISYLATE 5 MG/ML
5 INJECTION INTRAMUSCULAR; INTRAVENOUS EVERY 8 HOURS PRN
Status: DISCONTINUED | OUTPATIENT
Start: 2023-04-28 | End: 2023-04-28

## 2023-04-28 RX ORDER — ACETAMINOPHEN 500 MG
1000 TABLET ORAL ONCE
Status: DISCONTINUED | OUTPATIENT
Start: 2023-04-28 | End: 2023-04-28 | Stop reason: HOSPADM

## 2023-04-28 RX ORDER — HYDROMORPHONE HYDROCHLORIDE 1 MG/ML
0.2 INJECTION, SOLUTION INTRAMUSCULAR; INTRAVENOUS; SUBCUTANEOUS EVERY 5 MIN PRN
Status: DISCONTINUED | OUTPATIENT
Start: 2023-04-28 | End: 2023-04-28

## 2023-04-28 RX ORDER — NICOTINE POLACRILEX 4 MG
30 LOZENGE BUCCAL
Status: DISCONTINUED | OUTPATIENT
Start: 2023-04-28 | End: 2023-04-28 | Stop reason: HOSPADM

## 2023-04-28 RX ORDER — ROPIVACAINE HYDROCHLORIDE 5 MG/ML
INJECTION, SOLUTION EPIDURAL; INFILTRATION; PERINEURAL AS NEEDED
Status: DISCONTINUED | OUTPATIENT
Start: 2023-04-28 | End: 2023-04-29 | Stop reason: SURG

## 2023-04-28 RX ORDER — HYDROMORPHONE HYDROCHLORIDE 1 MG/ML
0.6 INJECTION, SOLUTION INTRAMUSCULAR; INTRAVENOUS; SUBCUTANEOUS EVERY 5 MIN PRN
Status: DISCONTINUED | OUTPATIENT
Start: 2023-04-28 | End: 2023-04-28

## 2023-04-28 RX ORDER — DEXAMETHASONE SODIUM PHOSPHATE 10 MG/ML
INJECTION, SOLUTION INTRAMUSCULAR; INTRAVENOUS AS NEEDED
Status: DISCONTINUED | OUTPATIENT
Start: 2023-04-28 | End: 2023-04-29 | Stop reason: SURG

## 2023-04-28 RX ORDER — ONDANSETRON 2 MG/ML
4 INJECTION INTRAMUSCULAR; INTRAVENOUS EVERY 6 HOURS PRN
Status: DISCONTINUED | OUTPATIENT
Start: 2023-04-28 | End: 2023-04-28

## 2023-04-28 RX ORDER — LABETALOL HYDROCHLORIDE 5 MG/ML
5 INJECTION, SOLUTION INTRAVENOUS EVERY 5 MIN PRN
Status: DISCONTINUED | OUTPATIENT
Start: 2023-04-28 | End: 2023-04-28

## 2023-04-28 RX ORDER — CEFAZOLIN SODIUM/WATER 2 G/20 ML
2 SYRINGE (ML) INTRAVENOUS ONCE
Status: COMPLETED | OUTPATIENT
Start: 2023-04-28 | End: 2023-04-28

## 2023-04-28 RX ORDER — ROCURONIUM BROMIDE 10 MG/ML
INJECTION, SOLUTION INTRAVENOUS AS NEEDED
Status: DISCONTINUED | OUTPATIENT
Start: 2023-04-28 | End: 2023-04-29 | Stop reason: SURG

## 2023-04-28 RX ORDER — SCOLOPAMINE TRANSDERMAL SYSTEM 1 MG/1
1 PATCH, EXTENDED RELEASE TRANSDERMAL ONCE
Status: DISCONTINUED | OUTPATIENT
Start: 2023-04-28 | End: 2023-04-28 | Stop reason: HOSPADM

## 2023-04-28 RX ORDER — HYDROCODONE BITARTRATE AND ACETAMINOPHEN 5; 325 MG/1; MG/1
2 TABLET ORAL ONCE AS NEEDED
Status: DISCONTINUED | OUTPATIENT
Start: 2023-04-28 | End: 2023-04-28

## 2023-04-28 RX ORDER — PHENYLEPHRINE HCL 10 MG/ML
VIAL (ML) INJECTION AS NEEDED
Status: DISCONTINUED | OUTPATIENT
Start: 2023-04-28 | End: 2023-04-29 | Stop reason: SURG

## 2023-04-28 RX ORDER — NALOXONE HYDROCHLORIDE 0.4 MG/ML
80 INJECTION, SOLUTION INTRAMUSCULAR; INTRAVENOUS; SUBCUTANEOUS AS NEEDED
Status: DISCONTINUED | OUTPATIENT
Start: 2023-04-28 | End: 2023-04-28

## 2023-04-28 RX ORDER — MIDAZOLAM HYDROCHLORIDE 1 MG/ML
1 INJECTION INTRAMUSCULAR; INTRAVENOUS EVERY 5 MIN PRN
Status: DISCONTINUED | OUTPATIENT
Start: 2023-04-28 | End: 2023-04-28

## 2023-04-28 RX ORDER — MEPERIDINE HYDROCHLORIDE 25 MG/ML
12.5 INJECTION INTRAMUSCULAR; INTRAVENOUS; SUBCUTANEOUS AS NEEDED
Status: DISCONTINUED | OUTPATIENT
Start: 2023-04-28 | End: 2023-04-28

## 2023-04-28 RX ORDER — SODIUM CHLORIDE, SODIUM LACTATE, POTASSIUM CHLORIDE, CALCIUM CHLORIDE 600; 310; 30; 20 MG/100ML; MG/100ML; MG/100ML; MG/100ML
INJECTION, SOLUTION INTRAVENOUS CONTINUOUS
Status: DISCONTINUED | OUTPATIENT
Start: 2023-04-28 | End: 2023-04-28

## 2023-04-28 RX ORDER — ACETAMINOPHEN 500 MG
1000 TABLET ORAL ONCE AS NEEDED
Status: DISCONTINUED | OUTPATIENT
Start: 2023-04-28 | End: 2023-04-28

## 2023-04-28 RX ORDER — HYDROCODONE BITARTRATE AND ACETAMINOPHEN 5; 325 MG/1; MG/1
1 TABLET ORAL EVERY 6 HOURS PRN
Qty: 20 TABLET | Refills: 0 | Status: SHIPPED | OUTPATIENT
Start: 2023-04-28

## 2023-04-28 RX ORDER — DEXTROSE MONOHYDRATE 25 G/50ML
50 INJECTION, SOLUTION INTRAVENOUS
Status: DISCONTINUED | OUTPATIENT
Start: 2023-04-28 | End: 2023-04-28 | Stop reason: HOSPADM

## 2023-04-28 RX ORDER — HYDROMORPHONE HYDROCHLORIDE 1 MG/ML
0.4 INJECTION, SOLUTION INTRAMUSCULAR; INTRAVENOUS; SUBCUTANEOUS EVERY 5 MIN PRN
Status: DISCONTINUED | OUTPATIENT
Start: 2023-04-28 | End: 2023-04-28

## 2023-04-28 RX ORDER — LIDOCAINE HYDROCHLORIDE 10 MG/ML
INJECTION, SOLUTION EPIDURAL; INFILTRATION; INTRACAUDAL; PERINEURAL AS NEEDED
Status: DISCONTINUED | OUTPATIENT
Start: 2023-04-28 | End: 2023-04-29 | Stop reason: SURG

## 2023-04-28 RX ORDER — NICOTINE POLACRILEX 4 MG
15 LOZENGE BUCCAL
Status: DISCONTINUED | OUTPATIENT
Start: 2023-04-28 | End: 2023-04-28 | Stop reason: HOSPADM

## 2023-04-28 RX ORDER — ONDANSETRON 2 MG/ML
INJECTION INTRAMUSCULAR; INTRAVENOUS AS NEEDED
Status: DISCONTINUED | OUTPATIENT
Start: 2023-04-28 | End: 2023-04-29 | Stop reason: SURG

## 2023-04-28 RX ADMIN — ROCURONIUM BROMIDE 50 MG: 10 INJECTION, SOLUTION INTRAVENOUS at 15:10:00

## 2023-04-28 RX ADMIN — LIDOCAINE HYDROCHLORIDE 50 MG: 10 INJECTION, SOLUTION EPIDURAL; INFILTRATION; INTRACAUDAL; PERINEURAL at 15:00:00

## 2023-04-28 RX ADMIN — DEXAMETHASONE SODIUM PHOSPHATE 2 MG: 10 INJECTION, SOLUTION INTRAMUSCULAR; INTRAVENOUS at 15:10:00

## 2023-04-28 RX ADMIN — ROCURONIUM BROMIDE 10 MG: 10 INJECTION, SOLUTION INTRAVENOUS at 15:30:00

## 2023-04-28 RX ADMIN — ROPIVACAINE HYDROCHLORIDE 30 ML: 5 INJECTION, SOLUTION EPIDURAL; INFILTRATION; PERINEURAL at 15:10:00

## 2023-04-28 RX ADMIN — ROCURONIUM BROMIDE 10 MG: 10 INJECTION, SOLUTION INTRAVENOUS at 15:00:00

## 2023-04-28 RX ADMIN — MIDAZOLAM HYDROCHLORIDE 1 MG: 1 INJECTION INTRAMUSCULAR; INTRAVENOUS at 14:55:00

## 2023-04-28 RX ADMIN — DEXAMETHASONE SODIUM PHOSPHATE 4 MG: 4 MG/ML VIAL (ML) INJECTION at 15:00:00

## 2023-04-28 RX ADMIN — CEFAZOLIN SODIUM/WATER 2 G: 2 G/20 ML SYRINGE (ML) INTRAVENOUS at 15:15:00

## 2023-04-28 RX ADMIN — PHENYLEPHRINE HCL 100 MCG: 10 MG/ML VIAL (ML) INJECTION at 15:00:00

## 2023-04-28 RX ADMIN — MIDAZOLAM HYDROCHLORIDE 1 MG: 1 INJECTION INTRAMUSCULAR; INTRAVENOUS at 14:50:00

## 2023-04-28 RX ADMIN — METOCLOPRAMIDE HYDROCHLORIDE 10 MG: 5 INJECTION INTRAMUSCULAR; INTRAVENOUS at 15:00:00

## 2023-04-28 RX ADMIN — KETOROLAC TROMETHAMINE 30 MG: 30 INJECTION, SOLUTION INTRAMUSCULAR; INTRAVENOUS at 16:15:00

## 2023-04-28 RX ADMIN — SODIUM CHLORIDE, SODIUM LACTATE, POTASSIUM CHLORIDE, CALCIUM CHLORIDE: 600; 310; 30; 20 INJECTION, SOLUTION INTRAVENOUS at 14:50:00

## 2023-04-28 RX ADMIN — ONDANSETRON 4 MG: 2 INJECTION INTRAMUSCULAR; INTRAVENOUS at 16:15:00

## 2023-04-28 NOTE — DISCHARGE INSTRUCTIONS
Move the shoulder as tolerated  Sling as needed  Ice the shoulder  Remove dressings in 2 days  Return to clinic next week    You received a drug called Toradol which in an anti-inflammatory at :4:15 pm  If you are allowed to take anti-inflammatories,  Do not take Motrin, Advil, Aleve, or ibuprofen until : 10:15 pm

## 2023-04-28 NOTE — ANESTHESIA PROCEDURE NOTES
Airway  Date/Time: 4/28/2023 3:02 PM  Urgency: elective    Airway not difficult    General Information and Staff    Patient location during procedure: OR  Anesthesiologist: Barrera Omalley MD  Performed: anesthesiologist   Performed by: Barrera Omalley MD  Authorized by:  Barrera Omalley MD      Indications and Patient Condition  Indications for airway management: anesthesia  Sedation level: deep  Preoxygenated: yes  Patient position: sniffing  Mask difficulty assessment: 1 - vent by mask    Final Airway Details  Final airway type: endotracheal airway      Successful airway: ETT  Cuffed: yes   Successful intubation technique: direct laryngoscopy  Facilitating devices/methods: intubating stylet  Endotracheal tube insertion site: oral  Blade: Mack  Blade size: #3  ETT size (mm): 7.0    Cormack-Lehane Classification: grade IIA - partial view of glottis  Placement verified by: chest auscultation and capnometry   Measured from: lips  ETT to lips (cm): 21  Number of attempts at approach: 1  Number of other approaches attempted: 0

## 2023-04-28 NOTE — ANESTHESIA PROCEDURE NOTES
Regional Block    Date/Time: 4/28/2023 3:06 PM    Performed by: Herb Castaneda MD  Authorized by: Herb Castaneda MD      General Information and Staff    Start Time:  4/28/2023 3:06 PM  End Time:  4/28/2023 3:10 PM  Anesthesiologist:  Herb Castaneda MD  Performed by: Anesthesiologist  Patient Location:  OR      Site Identification: real time ultrasound guided and image stored and retrievable    Block site/laterality marked before start: site marked  Reason for Block: at surgeon's request and post-op pain management    Preanesthetic Checklist: 2 patient identifers, IV checked, site marked, risks and benefits discussed, monitors and equipment checked, pre-op evaluation, timeout performed, anesthesia consent, sterile technique used, no prohibitive neurological deficits and no local skin infection at insertion site      Procedure Details    Patient Position:  Supine  Prep: ChloraPrep    Monitoring:  Cardiac monitor, continuous pulse ox and blood pressure cuff  Block Type: Interscalene  Laterality:  Left  Injection Technique:  Single-shot    Needle    Needle Type:  Short-bevel and echogenic  Needle Gauge:  21 G  Needle Length:  100 mm  Needle Localization:  Ultrasound guidance  Reason for Ultrasound Use: appropriate spread of the medication was noted in real time and no ultrasound evidence of intravascular and/or intraneural injection            Assessment    Injection Assessment:  Good spread noted, negative resistance, negative aspiration for heme, incremental injection, low pressure, local visualized surrounding nerve on ultrasound and no pain on injection  Paresthesia Pain:  None  Heart Rate Change: No    - Patient tolerated block procedure well without evidence of immediate block related complications.      Medications  4/28/2023 3:06 PM      Additional Comments

## 2023-04-28 NOTE — BRIEF OP NOTE
Pre-Operative Diagnosis: BICIPITAL TENDONITIS OF LEFT SHOULDER     Post-Operative Diagnosis: BICIPITAL TENDONITIS OF LEFT SHOULDER      Procedure Performed:   LEFT SHOULDER ARTHROSCOPY WITH LONG HEAD OF THE BICEPS TENOTOMY AND SUB-ACROMIAL DECOMPRESSION    Surgeon(s) and Role:     Guillermo Malik MD - Primary    Assistant(s):        Surgical Findings: fraying of the ant labrum, mild OA of the [address] joint, small articular sided SSP partial tear     Specimen: none     Estimated Blood Loss: Blood Output: 5 mL (4/28/2023  4:06 PM)      Dictation Number:       Hannah Chi MD  4/28/2023  4:33 PM

## 2023-04-29 NOTE — OPERATIVE REPORT
[address]    PATIENT'S NAME: Paris Gee   ATTENDING PHYSICIAN: Linda Tomlin M.D. OPERATING PHYSICIAN: Linda Tomlin M.D. PATIENT ACCOUNT#:   [de-identified]    LOCATION:  [facility] 2 ED 10  MEDICAL RECORD #:   [id number]       YOB: 1960  ADMISSION DATE:       04/28/2023      OPERATION DATE:  04/28/2023    OPERATIVE REPORT      PREOPERATIVE DIAGNOSIS:  Left shoulder bicipital tendinitis and rotator cuff syndrome. POSTOPERATIVE DIAGNOSIS:  Left shoulder biceps tendinitis and partial rotator cuff tear of the supraspinatus. PROCEDURE:  Left shoulder arthroscopy with long head of the biceps tenotomy and debridement of partial rotator cuff articular sided tear and subacromial decompression. ANESTHESIA:  General plus interscalene block. BLOOD LOSS:  Minimal.    TOURNIQUET TIME:  Zero. SPECIMENS:  None. COMPLICATIONS:  None. DISPOSITION:  Fair condition to the recovery room. PLAN:  Patient can begin immediate range of motion exercises. INDICATIONS:  The patient is a 42-year-old female with history of left shoulder pain. She did have difficulty despite therapy, anti-inflammatories, cortisone injection. She was therefore offered surgical intervention. The risks and benefits of the procedure were discussed in detail with the patient including risk of chronic pain, stiffness, infection. She shows good understanding of these issues and wished to proceed with surgery. FINDINGS:  Patient with inflammatory changes and thickened tissue around the long of the biceps, early osteoarthritic changes of the glenohumeral joint, fraying of the anterior labrum, and articular sided partial tear of the anterior fibers of the supraspinatus. OPERATIVE TECHNIQUE:  On the date of operation, I saw the patient in the holding room, initialed the surgical site. The patient was taken to the operating room and was placed supine on the OR table.   The patient underwent an interscalene block per Anesthesia and general endotracheal anesthesia was then smoothly initiated. She was placed in modified beach chair position. The left shoulder was prepped and draped in standard surgical fashion. A surgical time-out was taken to confirm patient, surgical site and procedure were verified. A posterior portal was established and a scope was introduced into the glenohumeral joint. The joint was then systematically visualized and the anterior portal was established under direct visualization as well. The patient did have inflammatory changes along the long head of the biceps. The subscapularis was intact. There was fraying and partial tear of the articular side of the anterior most fibers of the supraspinatus. She had fraying of the anterior labrum and did have early arthritic changes of the humeral head as well as some very mild arthritic changes of the glenoid. The arthroscopic scissors were then used to release the long head of the biceps, and the shaver and radiofrequency ablator were used to debride this back to a stable rim. The rotator interval was then opened as there was thickened scar tissue in this area. The shaver was used to debride the torn fibers of the anterior portion of the supraspinatus back to healthy tissue. There did appear to be good stability of the rotator cuff. The labrum also was debrided with the shaver. Attention was then turned to the subacromial space. The scope was introduced into the subacromial space and the anterolateral portal was established under direct visualization. The shaver and radiofrequency ablator were used to resect the subacromial bursa. The patient did not appear to have any significant spurring of the anterior lateral acromion. The rotator cuff was also visualized on the bursal surface and appeared to be intact. The shoulder was moved through a full range of motion. The cuff appeared to be intact.   Once the bursa had been resected, the instruments were removed from the shoulder and the shoulder was drained. The portal sites were then closed with 4-0 Monocryl in subcutaneous tissue. Sterile bulky dressings were applied. The patient awakened from anesthesia and was taken to the recovery room in fair condition. She will be sent home with postop written and oral instructions as well as oral narcotics for postop pain. She will follow up in 1 week for wound check.     Dictated By Luis Jacobson M.D.  d: 04/28/2023 16:37:10  t: 04/29/2023 01:24:56  [facility] [id number]  /

## 2023-06-07 ENCOUNTER — APPOINTMENT (OUTPATIENT)
Dept: CT IMAGING | Facility: HOSPITAL | Age: 63
End: 2023-06-07
Attending: STUDENT IN AN ORGANIZED HEALTH CARE EDUCATION/TRAINING PROGRAM
Payer: MEDICARE

## 2023-06-07 ENCOUNTER — APPOINTMENT (OUTPATIENT)
Dept: GENERAL RADIOLOGY | Facility: HOSPITAL | Age: 63
End: 2023-06-07
Payer: MEDICARE

## 2023-06-07 ENCOUNTER — HOSPITAL ENCOUNTER (INPATIENT)
Facility: HOSPITAL | Age: 63
LOS: 2 days | Discharge: HOME | End: 2023-06-09
Attending: STUDENT IN AN ORGANIZED HEALTH CARE EDUCATION/TRAINING PROGRAM | Admitting: INTERNAL MEDICINE
Payer: MEDICARE

## 2023-06-07 DIAGNOSIS — E87.6 HYPOKALEMIA: Primary | ICD-10-CM

## 2023-06-07 DIAGNOSIS — E83.42 HYPOMAGNESEMIA: ICD-10-CM

## 2023-06-07 LAB
ALBUMIN SERPL-MCNC: 4.3 G/DL (ref 3.4–5)
ALBUMIN/GLOB SERPL: 1.3 {RATIO} (ref 1–2)
ALP LIVER SERPL-CCNC: 45 U/L
ALT SERPL-CCNC: 23 U/L
ANION GAP SERPL CALC-SCNC: 15 MMOL/L (ref 0–18)
AST SERPL-CCNC: 19 U/L (ref 15–37)
ATRIAL RATE: 95 BPM
BASOPHILS # BLD AUTO: 0.08 X10(3) UL (ref 0–0.2)
BASOPHILS NFR BLD AUTO: 0.6 %
BILIRUB SERPL-MCNC: 0.7 MG/DL (ref 0.1–2)
BUN BLD-MCNC: 13 MG/DL (ref 7–18)
CALCIUM BLD-MCNC: 8.8 MG/DL (ref 8.5–10.1)
CHLORIDE SERPL-SCNC: 99 MMOL/L (ref 98–112)
CO2 SERPL-SCNC: 25 MMOL/L (ref 21–32)
CREAT BLD-MCNC: 1.12 MG/DL
D DIMER PPP FEU-MCNC: 1.2 UG/ML FEU (ref ?–0.65)
EOSINOPHIL # BLD AUTO: 0.07 X10(3) UL (ref 0–0.7)
EOSINOPHIL NFR BLD AUTO: 0.5 %
ERYTHROCYTE [DISTWIDTH] IN BLOOD BY AUTOMATED COUNT: 12.5 %
EST. AVERAGE GLUCOSE BLD GHB EST-MCNC: 111 MG/DL (ref 68–126)
GFR SERPLBLD BASED ON 1.73 SQ M-ARVRAT: 55 ML/MIN/1.73M2 (ref 60–?)
GLOBULIN PLAS-MCNC: 3.4 G/DL (ref 2.8–4.4)
GLUCOSE BLD-MCNC: 124 MG/DL (ref 70–99)
GLUCOSE BLD-MCNC: 94 MG/DL (ref 70–99)
HBA1C MFR BLD: 5.5 % (ref ?–5.7)
HCT VFR BLD AUTO: 41.6 %
HGB BLD-MCNC: 14.2 G/DL
IMM GRANULOCYTES # BLD AUTO: 0.06 X10(3) UL (ref 0–1)
IMM GRANULOCYTES NFR BLD: 0.4 %
LYMPHOCYTES # BLD AUTO: 4.79 X10(3) UL (ref 1–4)
LYMPHOCYTES NFR BLD AUTO: 35.1 %
MAGNESIUM SERPL-MCNC: 0.7 MG/DL (ref 1.6–2.6)
MCH RBC QN AUTO: 30.1 PG (ref 26–34)
MCHC RBC AUTO-ENTMCNC: 34.1 G/DL (ref 31–37)
MCV RBC AUTO: 88.3 FL
MONOCYTES # BLD AUTO: 0.98 X10(3) UL (ref 0.1–1)
MONOCYTES NFR BLD AUTO: 7.2 %
NEUTROPHILS # BLD AUTO: 7.67 X10 (3) UL (ref 1.5–7.7)
NEUTROPHILS # BLD AUTO: 7.67 X10(3) UL (ref 1.5–7.7)
NEUTROPHILS NFR BLD AUTO: 56.2 %
OSMOLALITY SERPL CALC.SUM OF ELEC: 288 MOSM/KG (ref 275–295)
P AXIS: 65 DEGREES
P-R INTERVAL: 126 MS
PLATELET # BLD AUTO: 355 10(3)UL (ref 150–450)
POTASSIUM SERPL-SCNC: 2.7 MMOL/L (ref 3.5–5.1)
PROT SERPL-MCNC: 7.7 G/DL (ref 6.4–8.2)
Q-T INTERVAL: 378 MS
QRS DURATION: 78 MS
QTC CALCULATION (BEZET): 475 MS
R AXIS: -13 DEGREES
RBC # BLD AUTO: 4.71 X10(6)UL
SODIUM SERPL-SCNC: 139 MMOL/L (ref 136–145)
T AXIS: 67 DEGREES
TROPONIN I HIGH SENSITIVITY: 7 NG/L
VENTRICULAR RATE: 95 BPM
WBC # BLD AUTO: 13.7 X10(3) UL (ref 4–11)

## 2023-06-07 PROCEDURE — 93005 ELECTROCARDIOGRAM TRACING: CPT

## 2023-06-07 PROCEDURE — 82962 GLUCOSE BLOOD TEST: CPT

## 2023-06-07 PROCEDURE — 80053 COMPREHEN METABOLIC PANEL: CPT

## 2023-06-07 PROCEDURE — 85379 FIBRIN DEGRADATION QUANT: CPT | Performed by: STUDENT IN AN ORGANIZED HEALTH CARE EDUCATION/TRAINING PROGRAM

## 2023-06-07 PROCEDURE — 71045 X-RAY EXAM CHEST 1 VIEW: CPT

## 2023-06-07 PROCEDURE — 85025 COMPLETE CBC W/AUTO DIFF WBC: CPT

## 2023-06-07 PROCEDURE — 83735 ASSAY OF MAGNESIUM: CPT | Performed by: STUDENT IN AN ORGANIZED HEALTH CARE EDUCATION/TRAINING PROGRAM

## 2023-06-07 PROCEDURE — 83036 HEMOGLOBIN GLYCOSYLATED A1C: CPT | Performed by: INTERNAL MEDICINE

## 2023-06-07 PROCEDURE — 71260 CT THORAX DX C+: CPT | Performed by: STUDENT IN AN ORGANIZED HEALTH CARE EDUCATION/TRAINING PROGRAM

## 2023-06-07 PROCEDURE — 84484 ASSAY OF TROPONIN QUANT: CPT

## 2023-06-07 RX ORDER — ATORVASTATIN CALCIUM 40 MG/1
40 TABLET, FILM COATED ORAL NIGHTLY
Status: DISCONTINUED | OUTPATIENT
Start: 2023-06-07 | End: 2023-06-09

## 2023-06-07 RX ORDER — SENNOSIDES 8.6 MG
17.2 TABLET ORAL NIGHTLY PRN
Status: DISCONTINUED | OUTPATIENT
Start: 2023-06-07 | End: 2023-06-09

## 2023-06-07 RX ORDER — BISACODYL 10 MG
10 SUPPOSITORY, RECTAL RECTAL
Status: DISCONTINUED | OUTPATIENT
Start: 2023-06-07 | End: 2023-06-09

## 2023-06-07 RX ORDER — ACETAMINOPHEN 500 MG
500 TABLET ORAL EVERY 4 HOURS PRN
Status: DISCONTINUED | OUTPATIENT
Start: 2023-06-07 | End: 2023-06-09

## 2023-06-07 RX ORDER — PREGABALIN 50 MG/1
50 CAPSULE ORAL 3 TIMES DAILY
Status: DISCONTINUED | OUTPATIENT
Start: 2023-06-07 | End: 2023-06-09

## 2023-06-07 RX ORDER — ONDANSETRON 2 MG/ML
4 INJECTION INTRAMUSCULAR; INTRAVENOUS EVERY 6 HOURS PRN
Status: DISCONTINUED | OUTPATIENT
Start: 2023-06-07 | End: 2023-06-09

## 2023-06-07 RX ORDER — ARIPIPRAZOLE 5 MG/1
5 TABLET ORAL NIGHTLY
Status: DISCONTINUED | OUTPATIENT
Start: 2023-06-07 | End: 2023-06-09

## 2023-06-07 RX ORDER — MELATONIN
3 NIGHTLY PRN
Status: DISCONTINUED | OUTPATIENT
Start: 2023-06-07 | End: 2023-06-09

## 2023-06-07 RX ORDER — MAGNESIUM SULFATE 1 G/100ML
1 INJECTION INTRAVENOUS ONCE
Status: DISCONTINUED | OUTPATIENT
Start: 2023-06-07 | End: 2023-06-07

## 2023-06-07 RX ORDER — HYDROCODONE BITARTRATE AND ACETAMINOPHEN 5; 325 MG/1; MG/1
1 TABLET ORAL EVERY 4 HOURS PRN
Status: DISCONTINUED | OUTPATIENT
Start: 2023-06-07 | End: 2023-06-09

## 2023-06-07 RX ORDER — LISINOPRIL 20 MG/1
20 TABLET ORAL DAILY
Status: DISCONTINUED | OUTPATIENT
Start: 2023-06-07 | End: 2023-06-09

## 2023-06-07 RX ORDER — LORAZEPAM 2 MG/ML
0.5 INJECTION INTRAMUSCULAR ONCE
Status: COMPLETED | OUTPATIENT
Start: 2023-06-07 | End: 2023-06-07

## 2023-06-07 RX ORDER — METOCLOPRAMIDE HYDROCHLORIDE 5 MG/ML
10 INJECTION INTRAMUSCULAR; INTRAVENOUS EVERY 8 HOURS PRN
Status: DISCONTINUED | OUTPATIENT
Start: 2023-06-07 | End: 2023-06-07

## 2023-06-07 RX ORDER — METOPROLOL SUCCINATE 25 MG/1
25 TABLET, EXTENDED RELEASE ORAL
Status: DISCONTINUED | OUTPATIENT
Start: 2023-06-08 | End: 2023-06-09

## 2023-06-07 RX ORDER — VENLAFAXINE HYDROCHLORIDE 75 MG/1
225 CAPSULE, EXTENDED RELEASE ORAL NIGHTLY
Status: DISCONTINUED | OUTPATIENT
Start: 2023-06-07 | End: 2023-06-09

## 2023-06-07 RX ORDER — ENEMA 19; 7 G/133ML; G/133ML
1 ENEMA RECTAL ONCE AS NEEDED
Status: DISCONTINUED | OUTPATIENT
Start: 2023-06-07 | End: 2023-06-09

## 2023-06-07 RX ORDER — POLYETHYLENE GLYCOL 3350 17 G/17G
17 POWDER, FOR SOLUTION ORAL DAILY PRN
Status: DISCONTINUED | OUTPATIENT
Start: 2023-06-07 | End: 2023-06-09

## 2023-06-07 RX ORDER — HYDROCODONE BITARTRATE AND ACETAMINOPHEN 5; 325 MG/1; MG/1
2 TABLET ORAL EVERY 4 HOURS PRN
Status: DISCONTINUED | OUTPATIENT
Start: 2023-06-07 | End: 2023-06-09

## 2023-06-07 RX ORDER — ENOXAPARIN SODIUM 100 MG/ML
40 INJECTION SUBCUTANEOUS DAILY
Status: DISCONTINUED | OUTPATIENT
Start: 2023-06-08 | End: 2023-06-09

## 2023-06-07 RX ORDER — NICOTINE POLACRILEX 4 MG
15 LOZENGE BUCCAL
Status: DISCONTINUED | OUTPATIENT
Start: 2023-06-07 | End: 2023-06-09

## 2023-06-07 RX ORDER — NICOTINE POLACRILEX 4 MG
30 LOZENGE BUCCAL
Status: DISCONTINUED | OUTPATIENT
Start: 2023-06-07 | End: 2023-06-09

## 2023-06-07 RX ORDER — SODIUM CHLORIDE 9 MG/ML
INJECTION, SOLUTION INTRAVENOUS CONTINUOUS
Status: DISCONTINUED | OUTPATIENT
Start: 2023-06-07 | End: 2023-06-08

## 2023-06-07 RX ORDER — PANTOPRAZOLE SODIUM 40 MG/1
40 TABLET, DELAYED RELEASE ORAL
Status: DISCONTINUED | OUTPATIENT
Start: 2023-06-08 | End: 2023-06-09

## 2023-06-07 RX ORDER — DEXTROSE MONOHYDRATE 25 G/50ML
50 INJECTION, SOLUTION INTRAVENOUS
Status: DISCONTINUED | OUTPATIENT
Start: 2023-06-07 | End: 2023-06-09

## 2023-06-07 RX ORDER — ONDANSETRON 2 MG/ML
4 INJECTION INTRAMUSCULAR; INTRAVENOUS ONCE
Status: COMPLETED | OUTPATIENT
Start: 2023-06-07 | End: 2023-06-07

## 2023-06-07 RX ORDER — ACETAMINOPHEN 325 MG/1
650 TABLET ORAL EVERY 4 HOURS PRN
Status: DISCONTINUED | OUTPATIENT
Start: 2023-06-07 | End: 2023-06-09

## 2023-06-07 NOTE — ED QUICK NOTES
Orders for admission, patient is aware of plan and ready to go upstairs. Any questions, please call ED RN Zane Morales at extension [phone].      Patient Covid vaccination status: Fully vaccinated     COVID Test Ordered in ED: None    COVID Suspicion at Admission: N/A    Running Infusions:  KCL 40mEq/250ml @ 62.5ml/hour  Magnesium sulfate 4gm/100ml @25ml/hour    Mental Status/LOC at time of transport: A&Ox4    Other pertinent information:   CIWA score: N/A   NIH score:  N/A

## 2023-06-07 NOTE — ED INITIAL ASSESSMENT (HPI)
[facility] with c/o chest tightness since 1100 today. Patient is hyperventilate upon arrival, c/o full body muscle cramping. Patient also c/o shortness of breath. Patient hands and legs are spasming.

## 2023-06-08 LAB
ANION GAP SERPL CALC-SCNC: 5 MMOL/L (ref 0–18)
BASOPHILS # BLD AUTO: 0.06 X10(3) UL (ref 0–0.2)
BASOPHILS NFR BLD AUTO: 0.8 %
BUN BLD-MCNC: 10 MG/DL (ref 7–18)
CALCIUM BLD-MCNC: 7.5 MG/DL (ref 8.5–10.1)
CHLORIDE SERPL-SCNC: 107 MMOL/L (ref 98–112)
CO2 SERPL-SCNC: 30 MMOL/L (ref 21–32)
CREAT BLD-MCNC: 0.9 MG/DL
EOSINOPHIL # BLD AUTO: 0.09 X10(3) UL (ref 0–0.7)
EOSINOPHIL NFR BLD AUTO: 1.2 %
ERYTHROCYTE [DISTWIDTH] IN BLOOD BY AUTOMATED COUNT: 12.8 %
GFR SERPLBLD BASED ON 1.73 SQ M-ARVRAT: 71 ML/MIN/1.73M2 (ref 60–?)
GLUCOSE BLD-MCNC: 115 MG/DL (ref 70–99)
GLUCOSE BLD-MCNC: 91 MG/DL (ref 70–99)
GLUCOSE BLD-MCNC: 97 MG/DL (ref 70–99)
GLUCOSE BLD-MCNC: 98 MG/DL (ref 70–99)
GLUCOSE BLD-MCNC: 98 MG/DL (ref 70–99)
HCT VFR BLD AUTO: 35.1 %
HGB BLD-MCNC: 11.8 G/DL
IMM GRANULOCYTES # BLD AUTO: 0.03 X10(3) UL (ref 0–1)
IMM GRANULOCYTES NFR BLD: 0.4 %
LYMPHOCYTES # BLD AUTO: 1.93 X10(3) UL (ref 1–4)
LYMPHOCYTES NFR BLD AUTO: 26.6 %
MAGNESIUM SERPL-MCNC: 1.9 MG/DL (ref 1.6–2.6)
MCH RBC QN AUTO: 30.3 PG (ref 26–34)
MCHC RBC AUTO-ENTMCNC: 33.6 G/DL (ref 31–37)
MCV RBC AUTO: 90 FL
MONOCYTES # BLD AUTO: 0.54 X10(3) UL (ref 0.1–1)
MONOCYTES NFR BLD AUTO: 7.4 %
NEUTROPHILS # BLD AUTO: 4.6 X10 (3) UL (ref 1.5–7.7)
NEUTROPHILS # BLD AUTO: 4.6 X10(3) UL (ref 1.5–7.7)
NEUTROPHILS NFR BLD AUTO: 63.6 %
OSMOLALITY SERPL CALC.SUM OF ELEC: 293 MOSM/KG (ref 275–295)
PLATELET # BLD AUTO: 204 10(3)UL (ref 150–450)
POTASSIUM SERPL-SCNC: 2.4 MMOL/L (ref 3.5–5.1)
POTASSIUM SERPL-SCNC: 3.3 MMOL/L (ref 3.5–5.1)
RBC # BLD AUTO: 3.9 X10(6)UL
SODIUM SERPL-SCNC: 142 MMOL/L (ref 136–145)
TROPONIN I HIGH SENSITIVITY: 9 NG/L
WBC # BLD AUTO: 7.3 X10(3) UL (ref 4–11)

## 2023-06-08 PROCEDURE — 82962 GLUCOSE BLOOD TEST: CPT

## 2023-06-08 PROCEDURE — 84484 ASSAY OF TROPONIN QUANT: CPT | Performed by: INTERNAL MEDICINE

## 2023-06-08 PROCEDURE — 83735 ASSAY OF MAGNESIUM: CPT | Performed by: HOSPITALIST

## 2023-06-08 PROCEDURE — 84132 ASSAY OF SERUM POTASSIUM: CPT | Performed by: INTERNAL MEDICINE

## 2023-06-08 PROCEDURE — 80048 BASIC METABOLIC PNL TOTAL CA: CPT | Performed by: INTERNAL MEDICINE

## 2023-06-08 PROCEDURE — 85025 COMPLETE CBC W/AUTO DIFF WBC: CPT | Performed by: INTERNAL MEDICINE

## 2023-06-08 RX ORDER — POTASSIUM CHLORIDE 20 MEQ/1
40 TABLET, EXTENDED RELEASE ORAL EVERY 4 HOURS
Status: DISPENSED | OUTPATIENT
Start: 2023-06-08 | End: 2023-06-09

## 2023-06-08 RX ORDER — POTASSIUM CHLORIDE 20 MEQ/1
40 TABLET, EXTENDED RELEASE ORAL EVERY 4 HOURS
Status: COMPLETED | OUTPATIENT
Start: 2023-06-08 | End: 2023-06-08

## 2023-06-08 NOTE — PROGRESS NOTES
NURSING ADMISSION NOTE      Patient admitted via Cart  Oriented to room. Safety precautions initiated. Bed in low position. Call light in reach. Upon arrival, pt A&Ox4. VSS. Denies SOB, Chest tightness/pain, and muscle cramps. RA.  WDL. Up ad elvi. See MAR for IV infusions. MD aware pt arrived to unit. POC discussed w/ pt and family, all questions answered and concerns addressed. Safety precautions in place. Report given to oncoming shift.

## 2023-06-08 NOTE — PLAN OF CARE
Upon assessment, A&Ox4. RA.  WDL. IS encouraged. NSR on tele. SCDs. Lovenox for VTE proph. Tolerating diet. Voids. Up ad elvi. IVF infusing. Electrolytes replaced per protocol. Denies SOB, CP, lightheadedness, and N/V. POC discussed, all questions answered and concerns addressed. Safety precautions in place. Frequent rounds performed.

## 2023-06-08 NOTE — PROGRESS NOTES
Patient A&Ox4, ADA diet and QID sugars, /Tele, up ad elvi, IVF. Patient placed on 2L NC for frequent desaturations to the 80s. Attempted to change type of pulse ox multiple time but was unsuccessful. Patient is stabilized on 2L with O2 in low 90s. No complaints of pain. Patient denies any shortness of breath, chest pain, and muscle cramps. Patient updated on plan of care, all questions answered.

## 2023-06-09 VITALS
TEMPERATURE: 99 F | SYSTOLIC BLOOD PRESSURE: 131 MMHG | WEIGHT: 235.88 LBS | BODY MASS INDEX: 34.94 KG/M2 | DIASTOLIC BLOOD PRESSURE: 75 MMHG | HEART RATE: 68 BPM | OXYGEN SATURATION: 96 % | RESPIRATION RATE: 18 BRPM | HEIGHT: 69 IN

## 2023-06-09 LAB
ANION GAP SERPL CALC-SCNC: 4 MMOL/L (ref 0–18)
BUN BLD-MCNC: 8 MG/DL (ref 7–18)
CALCIUM BLD-MCNC: 8.6 MG/DL (ref 8.5–10.1)
CHLORIDE SERPL-SCNC: 109 MMOL/L (ref 98–112)
CO2 SERPL-SCNC: 29 MMOL/L (ref 21–32)
CREAT BLD-MCNC: 0.7 MG/DL
ERYTHROCYTE [DISTWIDTH] IN BLOOD BY AUTOMATED COUNT: 12.7 %
GFR SERPLBLD BASED ON 1.73 SQ M-ARVRAT: 96 ML/MIN/1.73M2 (ref 60–?)
GLUCOSE BLD-MCNC: 83 MG/DL (ref 70–99)
GLUCOSE BLD-MCNC: 87 MG/DL (ref 70–99)
HCT VFR BLD AUTO: 35.5 %
HGB BLD-MCNC: 11.7 G/DL
MAGNESIUM SERPL-MCNC: 1.9 MG/DL (ref 1.6–2.6)
MCH RBC QN AUTO: 30.3 PG (ref 26–34)
MCHC RBC AUTO-ENTMCNC: 33 G/DL (ref 31–37)
MCV RBC AUTO: 92 FL
OSMOLALITY SERPL CALC.SUM OF ELEC: 291 MOSM/KG (ref 275–295)
PLATELET # BLD AUTO: 228 10(3)UL (ref 150–450)
POTASSIUM SERPL-SCNC: 3.2 MMOL/L (ref 3.5–5.1)
POTASSIUM SERPL-SCNC: 3.2 MMOL/L (ref 3.5–5.1)
RBC # BLD AUTO: 3.86 X10(6)UL
SODIUM SERPL-SCNC: 142 MMOL/L (ref 136–145)
WBC # BLD AUTO: 6.5 X10(3) UL (ref 4–11)

## 2023-06-09 PROCEDURE — 85027 COMPLETE CBC AUTOMATED: CPT | Performed by: HOSPITALIST

## 2023-06-09 PROCEDURE — 82962 GLUCOSE BLOOD TEST: CPT

## 2023-06-09 PROCEDURE — 83735 ASSAY OF MAGNESIUM: CPT | Performed by: HOSPITALIST

## 2023-06-09 PROCEDURE — 84132 ASSAY OF SERUM POTASSIUM: CPT | Performed by: HOSPITALIST

## 2023-06-09 PROCEDURE — 80048 BASIC METABOLIC PNL TOTAL CA: CPT | Performed by: HOSPITALIST

## 2023-06-09 RX ORDER — POTASSIUM CHLORIDE 20 MEQ/1
20 TABLET, EXTENDED RELEASE ORAL DAILY
Qty: 7 TABLET | Refills: 0 | Status: SHIPPED | OUTPATIENT
Start: 2023-06-10 | End: 2023-06-17

## 2023-06-09 RX ORDER — POTASSIUM CHLORIDE 20 MEQ/1
40 TABLET, EXTENDED RELEASE ORAL EVERY 4 HOURS
Status: DISCONTINUED | OUTPATIENT
Start: 2023-06-09 | End: 2023-06-09

## 2023-06-09 NOTE — PLAN OF CARE
A&Ox4. VSS. RA. . Denies chest pain and SOB. Telemetry: NSR. GI: Abdomen soft, nondistended. Passage of gas. Denies nausea. : Voids- Clear yellow. Pain managed with PRN pain medications per MAR. Up ab elvi. Diet: Carb controlled- tolerating well. All appropriate safety measures in place. All questions and concerns addressed.

## 2023-06-15 ENCOUNTER — ANESTHESIA (OUTPATIENT)
Dept: SURGERY | Facility: HOSPITAL | Age: 63
End: 2023-06-15
Payer: MEDICARE

## 2023-06-15 ENCOUNTER — HOSPITAL ENCOUNTER (OUTPATIENT)
Facility: HOSPITAL | Age: 63
Setting detail: HOSPITAL OUTPATIENT SURGERY
Discharge: HOME | End: 2023-06-15
Attending: ORTHOPAEDIC SURGERY | Admitting: ORTHOPAEDIC SURGERY
Payer: MEDICARE

## 2023-06-15 ENCOUNTER — ANESTHESIA EVENT (OUTPATIENT)
Dept: SURGERY | Facility: HOSPITAL | Age: 63
End: 2023-06-15
Payer: MEDICARE

## 2023-06-15 VITALS
HEART RATE: 82 BPM | TEMPERATURE: 98 F | OXYGEN SATURATION: 92 % | BODY MASS INDEX: 34.1 KG/M2 | DIASTOLIC BLOOD PRESSURE: 73 MMHG | RESPIRATION RATE: 16 BRPM | HEIGHT: 68 IN | WEIGHT: 225 LBS | SYSTOLIC BLOOD PRESSURE: 119 MMHG

## 2023-06-15 LAB
GLUCOSE BLD-MCNC: 92 MG/DL (ref 70–99)
GLUCOSE BLD-MCNC: 97 MG/DL (ref 70–99)

## 2023-06-15 PROCEDURE — 82962 GLUCOSE BLOOD TEST: CPT

## 2023-06-15 PROCEDURE — 0LQL0ZZ REPAIR RIGHT UPPER LEG TENDON, OPEN APPROACH: ICD-10-PCS | Performed by: ORTHOPAEDIC SURGERY

## 2023-06-15 PROCEDURE — 76942 ECHO GUIDE FOR BIOPSY: CPT | Performed by: ANESTHESIOLOGY

## 2023-06-15 RX ORDER — BUPRENORPHINE HYDROCHLORIDE 0.32 MG/ML
INJECTION INTRAMUSCULAR; INTRAVENOUS AS NEEDED
Status: DISCONTINUED | OUTPATIENT
Start: 2023-06-15 | End: 2023-06-15 | Stop reason: SURG

## 2023-06-15 RX ORDER — HYDROCODONE BITARTRATE AND ACETAMINOPHEN 10; 325 MG/1; MG/1
TABLET ORAL
COMMUNITY
Start: 2023-03-18

## 2023-06-15 RX ORDER — LIDOCAINE HYDROCHLORIDE 10 MG/ML
INJECTION, SOLUTION EPIDURAL; INFILTRATION; INTRACAUDAL; PERINEURAL AS NEEDED
Status: DISCONTINUED | OUTPATIENT
Start: 2023-06-15 | End: 2023-06-15 | Stop reason: SURG

## 2023-06-15 RX ORDER — ACETAMINOPHEN 500 MG
1000 TABLET ORAL ONCE
Status: DISCONTINUED | OUTPATIENT
Start: 2023-06-15 | End: 2023-06-15

## 2023-06-15 RX ORDER — HYDROMORPHONE HYDROCHLORIDE 1 MG/ML
INJECTION, SOLUTION INTRAMUSCULAR; INTRAVENOUS; SUBCUTANEOUS
Status: COMPLETED
Start: 2023-06-15 | End: 2023-06-15

## 2023-06-15 RX ORDER — DEXAMETHASONE SODIUM PHOSPHATE 10 MG/ML
INJECTION, SOLUTION INTRAMUSCULAR; INTRAVENOUS AS NEEDED
Status: DISCONTINUED | OUTPATIENT
Start: 2023-06-15 | End: 2023-06-15 | Stop reason: SURG

## 2023-06-15 RX ORDER — CEFAZOLIN SODIUM/WATER 2 G/20 ML
SYRINGE (ML) INTRAVENOUS
Status: DISCONTINUED
Start: 2023-06-15 | End: 2023-06-15 | Stop reason: WASHOUT

## 2023-06-15 RX ORDER — NICOTINE POLACRILEX 4 MG
30 LOZENGE BUCCAL
Status: DISCONTINUED | OUTPATIENT
Start: 2023-06-15 | End: 2023-06-15

## 2023-06-15 RX ORDER — CEFAZOLIN SODIUM/WATER 2 G/20 ML
2 SYRINGE (ML) INTRAVENOUS ONCE
Status: COMPLETED | OUTPATIENT
Start: 2023-06-15 | End: 2023-06-15

## 2023-06-15 RX ORDER — METOPROLOL TARTRATE 5 MG/5ML
2.5 INJECTION INTRAVENOUS ONCE
Status: DISCONTINUED | OUTPATIENT
Start: 2023-06-15 | End: 2023-06-15

## 2023-06-15 RX ORDER — SODIUM CHLORIDE, SODIUM LACTATE, POTASSIUM CHLORIDE, CALCIUM CHLORIDE 600; 310; 30; 20 MG/100ML; MG/100ML; MG/100ML; MG/100ML
INJECTION, SOLUTION INTRAVENOUS CONTINUOUS
Status: DISCONTINUED | OUTPATIENT
Start: 2023-06-15 | End: 2023-06-15

## 2023-06-15 RX ORDER — HYDROMORPHONE HYDROCHLORIDE 1 MG/ML
0.4 INJECTION, SOLUTION INTRAMUSCULAR; INTRAVENOUS; SUBCUTANEOUS EVERY 5 MIN PRN
Status: DISCONTINUED | OUTPATIENT
Start: 2023-06-15 | End: 2023-06-15

## 2023-06-15 RX ORDER — NICOTINE POLACRILEX 4 MG
15 LOZENGE BUCCAL
Status: DISCONTINUED | OUTPATIENT
Start: 2023-06-15 | End: 2023-06-15

## 2023-06-15 RX ORDER — DEXTROSE MONOHYDRATE 25 G/50ML
50 INJECTION, SOLUTION INTRAVENOUS
Status: DISCONTINUED | OUTPATIENT
Start: 2023-06-15 | End: 2023-06-15

## 2023-06-15 RX ORDER — HYDROMORPHONE HYDROCHLORIDE 1 MG/ML
0.2 INJECTION, SOLUTION INTRAMUSCULAR; INTRAVENOUS; SUBCUTANEOUS EVERY 5 MIN PRN
Status: DISCONTINUED | OUTPATIENT
Start: 2023-06-15 | End: 2023-06-15

## 2023-06-15 RX ORDER — MELOXICAM 15 MG/1
15 TABLET ORAL DAILY
COMMUNITY
Start: 2023-05-23

## 2023-06-15 RX ORDER — KETOROLAC TROMETHAMINE 30 MG/ML
INJECTION, SOLUTION INTRAMUSCULAR; INTRAVENOUS AS NEEDED
Status: DISCONTINUED | OUTPATIENT
Start: 2023-06-15 | End: 2023-06-15 | Stop reason: SURG

## 2023-06-15 RX ORDER — HYDROMORPHONE HYDROCHLORIDE 1 MG/ML
0.6 INJECTION, SOLUTION INTRAMUSCULAR; INTRAVENOUS; SUBCUTANEOUS EVERY 5 MIN PRN
Status: DISCONTINUED | OUTPATIENT
Start: 2023-06-15 | End: 2023-06-15

## 2023-06-15 RX ORDER — HYDROCODONE BITARTRATE AND ACETAMINOPHEN 5; 325 MG/1; MG/1
2 TABLET ORAL EVERY 4 HOURS PRN
Status: DISCONTINUED | OUTPATIENT
Start: 2023-06-15 | End: 2023-06-15

## 2023-06-15 RX ORDER — DEXAMETHASONE SODIUM PHOSPHATE 4 MG/ML
VIAL (ML) INJECTION AS NEEDED
Status: DISCONTINUED | OUTPATIENT
Start: 2023-06-15 | End: 2023-06-15 | Stop reason: SURG

## 2023-06-15 RX ORDER — ONDANSETRON 2 MG/ML
INJECTION INTRAMUSCULAR; INTRAVENOUS AS NEEDED
Status: DISCONTINUED | OUTPATIENT
Start: 2023-06-15 | End: 2023-06-15 | Stop reason: SURG

## 2023-06-15 RX ORDER — NALOXONE HYDROCHLORIDE 0.4 MG/ML
80 INJECTION, SOLUTION INTRAMUSCULAR; INTRAVENOUS; SUBCUTANEOUS AS NEEDED
Status: DISCONTINUED | OUTPATIENT
Start: 2023-06-15 | End: 2023-06-15

## 2023-06-15 RX ORDER — HYDROCODONE BITARTRATE AND ACETAMINOPHEN 5; 325 MG/1; MG/1
1 TABLET ORAL EVERY 4 HOURS PRN
Status: DISCONTINUED | OUTPATIENT
Start: 2023-06-15 | End: 2023-06-15

## 2023-06-15 RX ORDER — SCOLOPAMINE TRANSDERMAL SYSTEM 1 MG/1
1 PATCH, EXTENDED RELEASE TRANSDERMAL ONCE
Status: DISCONTINUED | OUTPATIENT
Start: 2023-06-15 | End: 2023-06-15

## 2023-06-15 RX ADMIN — DEXAMETHASONE SODIUM PHOSPHATE 4 MG: 10 INJECTION, SOLUTION INTRAMUSCULAR; INTRAVENOUS at 09:04:00

## 2023-06-15 RX ADMIN — DEXAMETHASONE SODIUM PHOSPHATE 4 MG: 4 MG/ML VIAL (ML) INJECTION at 09:31:00

## 2023-06-15 RX ADMIN — ONDANSETRON 4 MG: 2 INJECTION INTRAMUSCULAR; INTRAVENOUS at 09:31:00

## 2023-06-15 RX ADMIN — KETOROLAC TROMETHAMINE 30 MG: 30 INJECTION, SOLUTION INTRAMUSCULAR; INTRAVENOUS at 09:33:00

## 2023-06-15 RX ADMIN — SODIUM CHLORIDE, SODIUM LACTATE, POTASSIUM CHLORIDE, CALCIUM CHLORIDE: 600; 310; 30; 20 INJECTION, SOLUTION INTRAVENOUS at 10:14:00

## 2023-06-15 RX ADMIN — CEFAZOLIN SODIUM/WATER 2 G: 2 G/20 ML SYRINGE (ML) INTRAVENOUS at 09:02:00

## 2023-06-15 RX ADMIN — LIDOCAINE HYDROCHLORIDE 50 MG: 10 INJECTION, SOLUTION EPIDURAL; INFILTRATION; INTRACAUDAL; PERINEURAL at 08:59:00

## 2023-06-15 RX ADMIN — BUPRENORPHINE HYDROCHLORIDE 150 MCG: 0.32 INJECTION INTRAMUSCULAR; INTRAVENOUS at 09:04:00

## 2023-06-15 NOTE — INTERVAL H&P NOTE
Pre-op Diagnosis: RIGHT QUADRICEPS TENDON RUPTURE    The above referenced H&P was reviewed by Robert Devi MD on 6/15/2023, the patient was examined and no significant changes have occurred in the patient's condition since the H&P was performed. I discussed with the patient and/or legal representative the potential benefits, risks and side effects of this procedure; the likelihood of the patient achieving goals; and potential problems that might occur during recuperation. I discussed reasonable alternatives to the procedure, including risks, benefits and side effects related to the alternatives and risks related to not receiving this procedure. We will proceed with procedure as planned.

## 2023-06-15 NOTE — INTERVAL H&P NOTE
Pre-op Diagnosis: RIGHT QUADRICEPS TENDON RUPTURE    The above referenced H&P was reviewed by Alejo Linares MD on 6/15/2023, the patient was examined and no significant changes have occurred in the patient's condition since the H&P was performed. I discussed with the patient and/or legal representative the potential benefits, risks and side effects of this procedure; the likelihood of the patient achieving goals; and potential problems that might occur during recuperation. I discussed reasonable alternatives to the procedure, including risks, benefits and side effects related to the alternatives and risks related to not receiving this procedure. We will proceed with procedure as planned.

## 2023-06-15 NOTE — ANESTHESIA PROCEDURE NOTES
Airway  Date/Time: 6/15/2023 9:01 AM  Urgency: elective      General Information and Staff    Patient location during procedure: OR  Anesthesiologist: Brigitte Cortez MD  Performed: anesthesiologist   Performed by: Brigitte Cortez MD  Authorized by: Brigitte Cortez MD      Indications and Patient Condition  Indications for airway management: anesthesia  Sedation level: deep  Preoxygenated: yes  Patient position: sniffing  Mask difficulty assessment: 1 - vent by mask    Final Airway Details  Final airway type: supraglottic airway      Successful airway: classic  Size 3       Number of attempts at approach: 1

## 2023-06-15 NOTE — BRIEF OP NOTE
Pre-Operative Diagnosis: RIGHT QUADRICEPS TENDON RUPTURE     Post-Operative Diagnosis: RIGHT QUADRICEPS TENDON RUPTURE      Procedure Performed:   RIGHT QUADRICEPS TENDON REPAIR    Surgeon(s) and Role:     * Tracey Lainez MD - Primary    Assistant(s):  PA:  Orin Chen PA-C     Surgical Findings: Tear     Specimen: none     Estimated Blood Loss: Blood Output: 5 mL (6/15/2023  9:32 AM)      Dictation Number:  Rasheeda Malagon MD  6/15/2023  9:42 AM

## 2023-06-15 NOTE — ANESTHESIA PROCEDURE NOTES
Regional Block    Date/Time: 6/15/2023 9:02 AM    Performed by: Peter Garcia MD  Authorized by: Peter Garcia MD      General Information and Staff    Start Time:  6/15/2023 9:02 AM  End Time:  6/15/2023 9:04 AM  Anesthesiologist:  Peter Garcia MD  Performed by: Anesthesiologist  Patient Location:  OR      Site Identification: real time ultrasound guided and image stored and retrievable    Block site/laterality marked before start: site marked  Reason for Block: at surgeon's request and post-op pain management    Preanesthetic Checklist: 2 patient identifers, IV checked, risks and benefits discussed, monitors and equipment checked, pre-op evaluation, timeout performed, anesthesia consent, sterile technique used, no prohibitive neurological deficits and no local skin infection at insertion site      Procedure Details    Patient Position:  Supine  Prep: ChloraPrep    Monitoring:  Cardiac monitor, continuous pulse ox and blood pressure cuff  Block Type: Adductor canal  Laterality:  Right  Injection Technique:  Single-shot    Needle    Needle Type:  Short-bevel and echogenic  Needle Gauge:  21 G  Needle Length:  100 mm  Needle Localization:  Ultrasound guidance  Reason for Ultrasound Use: appropriate spread of the medication was noted in real time and no ultrasound evidence of intravascular and/or intraneural injection            Assessment    Injection Assessment:  Good spread noted, negative resistance, negative aspiration for heme, incremental injection and low pressure  Heart Rate Change: No    - Patient tolerated block procedure well without evidence of immediate block related complications.      Medications  6/15/2023 9:02 AM      Additional Comments    Medication:  Ropivacaine 0.375% 20mL plus pf decadron 4mg plus buprenorphine 150mcg

## 2023-06-15 NOTE — DISCHARGE INSTRUCTIONS
Elevate and ice  WBAT crutch ambulation in brace  Brace on at all times  Pt has Rx for pain, f/u 7-10 days   Ok to take NSAIDS (advil, motrin, ibuprofen or aleve, naproxen)  May shower tomorrow    You received a drug called Toradol which is an Anti Inflammatory at: 9:30 am     Do not take any Anti Inflammatory like Motrin, Advil, Aleve or Ibuprofen until after: 3:30 pm   Please report any suspected allergic reactions or bleeding issues to your doctor

## 2023-12-03 ENCOUNTER — HOSPITAL ENCOUNTER (INPATIENT)
Facility: HOSPITAL | Age: 63
LOS: 2 days | Discharge: HOME | End: 2023-12-05
Attending: EMERGENCY MEDICINE | Admitting: INTERNAL MEDICINE
Payer: MEDICARE

## 2023-12-03 ENCOUNTER — APPOINTMENT (OUTPATIENT)
Dept: GENERAL RADIOLOGY | Facility: HOSPITAL | Age: 63
End: 2023-12-03
Attending: EMERGENCY MEDICINE
Payer: MEDICARE

## 2023-12-03 DIAGNOSIS — E86.0 DEHYDRATION: ICD-10-CM

## 2023-12-03 DIAGNOSIS — R19.7 DIARRHEA, UNSPECIFIED TYPE: ICD-10-CM

## 2023-12-03 DIAGNOSIS — E87.6 HYPOKALEMIA: ICD-10-CM

## 2023-12-03 DIAGNOSIS — E83.42 HYPOMAGNESEMIA: Primary | ICD-10-CM

## 2023-12-03 DIAGNOSIS — E87.0 HYPERNATREMIA: ICD-10-CM

## 2023-12-03 LAB
ALBUMIN SERPL-MCNC: 3.4 G/DL (ref 3.4–5)
ALBUMIN/GLOB SERPL: 1.2 {RATIO} (ref 1–2)
ALP LIVER SERPL-CCNC: 30 U/L
ALT SERPL-CCNC: 22 U/L
ANION GAP SERPL CALC-SCNC: 3 MMOL/L (ref 0–18)
ANION GAP SERPL CALC-SCNC: 6 MMOL/L (ref 0–18)
AST SERPL-CCNC: 14 U/L (ref 15–37)
BASOPHILS # BLD AUTO: 0.04 X10(3) UL (ref 0–0.2)
BASOPHILS NFR BLD AUTO: 0.6 %
BILIRUB SERPL-MCNC: 0.6 MG/DL (ref 0.1–2)
BUN BLD-MCNC: 11 MG/DL (ref 9–23)
BUN BLD-MCNC: 11 MG/DL (ref 9–23)
CALCIUM BLD-MCNC: 7.4 MG/DL (ref 8.5–10.1)
CALCIUM BLD-MCNC: 7.8 MG/DL (ref 8.5–10.1)
CHLORIDE SERPL-SCNC: 116 MMOL/L (ref 98–112)
CHLORIDE SERPL-SCNC: 117 MMOL/L (ref 98–112)
CO2 SERPL-SCNC: 24 MMOL/L (ref 21–32)
CO2 SERPL-SCNC: 25 MMOL/L (ref 21–32)
CREAT BLD-MCNC: 0.89 MG/DL
CREAT BLD-MCNC: 0.9 MG/DL
EGFRCR SERPLBLD CKD-EPI 2021: 71 ML/MIN/1.73M2 (ref 60–?)
EGFRCR SERPLBLD CKD-EPI 2021: 72 ML/MIN/1.73M2 (ref 60–?)
EOSINOPHIL # BLD AUTO: 0.02 X10(3) UL (ref 0–0.7)
EOSINOPHIL NFR BLD AUTO: 0.3 %
ERYTHROCYTE [DISTWIDTH] IN BLOOD BY AUTOMATED COUNT: 12.8 %
FLUAV + FLUBV RNA SPEC NAA+PROBE: NEGATIVE
FLUAV + FLUBV RNA SPEC NAA+PROBE: NEGATIVE
GLOBULIN PLAS-MCNC: 2.9 G/DL (ref 2.8–4.4)
GLUCOSE BLD-MCNC: 87 MG/DL (ref 70–99)
GLUCOSE BLD-MCNC: 89 MG/DL (ref 70–99)
GLUCOSE BLD-MCNC: 94 MG/DL (ref 70–99)
HCT VFR BLD AUTO: 30.7 %
HGB BLD-MCNC: 10.5 G/DL
IMM GRANULOCYTES # BLD AUTO: 0.05 X10(3) UL (ref 0–1)
IMM GRANULOCYTES NFR BLD: 0.7 %
LYMPHOCYTES # BLD AUTO: 1.81 X10(3) UL (ref 1–4)
LYMPHOCYTES NFR BLD AUTO: 25.6 %
MAGNESIUM SERPL-MCNC: 0.6 MG/DL (ref 1.6–2.6)
MAGNESIUM SERPL-MCNC: 0.8 MG/DL (ref 1.6–2.6)
MCH RBC QN AUTO: 30 PG (ref 26–34)
MCHC RBC AUTO-ENTMCNC: 34.2 G/DL (ref 31–37)
MCV RBC AUTO: 87.7 FL
MONOCYTES # BLD AUTO: 0.44 X10(3) UL (ref 0.1–1)
MONOCYTES NFR BLD AUTO: 6.2 %
NEUTROPHILS # BLD AUTO: 4.7 X10 (3) UL (ref 1.5–7.7)
NEUTROPHILS # BLD AUTO: 4.7 X10(3) UL (ref 1.5–7.7)
NEUTROPHILS NFR BLD AUTO: 66.6 %
OSMOLALITY SERPL CALC.SUM OF ELEC: 299 MOSM/KG (ref 275–295)
OSMOLALITY SERPL CALC.SUM OF ELEC: 301 MOSM/KG (ref 275–295)
PLATELET # BLD AUTO: 267 10(3)UL (ref 150–450)
POTASSIUM SERPL-SCNC: 3 MMOL/L (ref 3.5–5.1)
POTASSIUM SERPL-SCNC: 3.2 MMOL/L (ref 3.5–5.1)
PROT SERPL-MCNC: 6.3 G/DL (ref 6.4–8.2)
RBC # BLD AUTO: 3.5 X10(6)UL
RSV RNA SPEC NAA+PROBE: NEGATIVE
SARS-COV-2 RNA RESP QL NAA+PROBE: NOT DETECTED
SODIUM SERPL-SCNC: 145 MMOL/L (ref 136–145)
SODIUM SERPL-SCNC: 146 MMOL/L (ref 136–145)
WBC # BLD AUTO: 7.1 X10(3) UL (ref 4–11)

## 2023-12-03 PROCEDURE — 0241U SARS-COV-2/FLU A AND B/RSV BY PCR (GENEXPERT): CPT | Performed by: EMERGENCY MEDICINE

## 2023-12-03 PROCEDURE — 85025 COMPLETE CBC W/AUTO DIFF WBC: CPT | Performed by: EMERGENCY MEDICINE

## 2023-12-03 PROCEDURE — 80053 COMPREHEN METABOLIC PANEL: CPT | Performed by: EMERGENCY MEDICINE

## 2023-12-03 PROCEDURE — 93005 ELECTROCARDIOGRAM TRACING: CPT

## 2023-12-03 PROCEDURE — 71045 X-RAY EXAM CHEST 1 VIEW: CPT | Performed by: EMERGENCY MEDICINE

## 2023-12-03 PROCEDURE — 82962 GLUCOSE BLOOD TEST: CPT

## 2023-12-03 PROCEDURE — 83735 ASSAY OF MAGNESIUM: CPT | Performed by: HOSPITALIST

## 2023-12-03 PROCEDURE — 83735 ASSAY OF MAGNESIUM: CPT | Performed by: EMERGENCY MEDICINE

## 2023-12-03 RX ORDER — NICOTINE POLACRILEX 4 MG
30 LOZENGE BUCCAL
Status: DISCONTINUED | OUTPATIENT
Start: 2023-12-03 | End: 2023-12-05

## 2023-12-03 RX ORDER — ENOXAPARIN SODIUM 100 MG/ML
40 INJECTION SUBCUTANEOUS DAILY
Status: DISCONTINUED | OUTPATIENT
Start: 2023-12-04 | End: 2023-12-05

## 2023-12-03 RX ORDER — NICOTINE POLACRILEX 4 MG
15 LOZENGE BUCCAL
Status: DISCONTINUED | OUTPATIENT
Start: 2023-12-03 | End: 2023-12-05

## 2023-12-03 RX ORDER — SODIUM CHLORIDE 9 MG/ML
INJECTION, SOLUTION INTRAVENOUS CONTINUOUS
Status: DISCONTINUED | OUTPATIENT
Start: 2023-12-03 | End: 2023-12-04

## 2023-12-03 RX ORDER — POTASSIUM CHLORIDE 20 MEQ/1
40 TABLET, EXTENDED RELEASE ORAL ONCE
Status: COMPLETED | OUTPATIENT
Start: 2023-12-03 | End: 2023-12-03

## 2023-12-03 RX ORDER — ALPRAZOLAM 0.5 MG/1
0.5 TABLET ORAL NIGHTLY PRN
Status: DISCONTINUED | OUTPATIENT
Start: 2023-12-03 | End: 2023-12-05

## 2023-12-03 RX ORDER — MAGNESIUM OXIDE 400 MG/1
400 TABLET ORAL 2 TIMES DAILY
COMMUNITY
Start: 2023-11-25 | End: 2023-12-05

## 2023-12-03 RX ORDER — ATORVASTATIN CALCIUM 40 MG/1
40 TABLET, FILM COATED ORAL NIGHTLY
Status: DISCONTINUED | OUTPATIENT
Start: 2023-12-03 | End: 2023-12-05

## 2023-12-03 RX ORDER — ACETAMINOPHEN 500 MG
500 TABLET ORAL EVERY 4 HOURS PRN
Status: DISCONTINUED | OUTPATIENT
Start: 2023-12-03 | End: 2023-12-05

## 2023-12-03 RX ORDER — VENLAFAXINE HYDROCHLORIDE 75 MG/1
225 CAPSULE, EXTENDED RELEASE ORAL NIGHTLY
Status: DISCONTINUED | OUTPATIENT
Start: 2023-12-03 | End: 2023-12-05

## 2023-12-03 RX ORDER — SODIUM CHLORIDE 9 MG/ML
INJECTION, SOLUTION INTRAVENOUS CONTINUOUS
Status: ACTIVE | OUTPATIENT
Start: 2023-12-03 | End: 2023-12-03

## 2023-12-03 RX ORDER — ARIPIPRAZOLE 5 MG/1
5 TABLET ORAL NIGHTLY
Status: DISCONTINUED | OUTPATIENT
Start: 2023-12-03 | End: 2023-12-05

## 2023-12-03 RX ORDER — POTASSIUM CHLORIDE 20 MEQ/1
20 TABLET, EXTENDED RELEASE ORAL 2 TIMES DAILY
COMMUNITY
Start: 2023-11-25

## 2023-12-03 RX ORDER — MAGNESIUM SULFATE 1 G/100ML
1 INJECTION INTRAVENOUS ONCE
Status: COMPLETED | OUTPATIENT
Start: 2023-12-03 | End: 2023-12-03

## 2023-12-03 RX ORDER — DEXTROSE MONOHYDRATE 25 G/50ML
50 INJECTION, SOLUTION INTRAVENOUS
Status: DISCONTINUED | OUTPATIENT
Start: 2023-12-03 | End: 2023-12-05

## 2023-12-03 RX ORDER — ALPRAZOLAM 0.5 MG/1
1 TABLET ORAL NIGHTLY PRN
COMMUNITY
Start: 2023-01-31

## 2023-12-03 RX ORDER — PREGABALIN 50 MG/1
50 CAPSULE ORAL 3 TIMES DAILY
Status: DISCONTINUED | OUTPATIENT
Start: 2023-12-03 | End: 2023-12-05

## 2023-12-03 RX ORDER — PANTOPRAZOLE SODIUM 40 MG/1
40 TABLET, DELAYED RELEASE ORAL
Status: DISCONTINUED | OUTPATIENT
Start: 2023-12-04 | End: 2023-12-05

## 2023-12-03 RX ORDER — BUSPIRONE HYDROCHLORIDE 5 MG/1
10 TABLET ORAL 2 TIMES DAILY
Status: DISCONTINUED | OUTPATIENT
Start: 2023-12-03 | End: 2023-12-05

## 2023-12-03 RX ORDER — LISINOPRIL 20 MG/1
20 TABLET ORAL DAILY
Status: DISCONTINUED | OUTPATIENT
Start: 2023-12-04 | End: 2023-12-05

## 2023-12-03 RX ORDER — BUSPIRONE HYDROCHLORIDE 5 MG/1
10 TABLET ORAL 2 TIMES DAILY
Status: DISCONTINUED | OUTPATIENT
Start: 2023-12-04 | End: 2023-12-03

## 2023-12-03 RX ORDER — BUSPIRONE HYDROCHLORIDE 5 MG/1
20 TABLET ORAL 3 TIMES DAILY
Status: DISCONTINUED | OUTPATIENT
Start: 2023-12-03 | End: 2023-12-03

## 2023-12-03 RX ORDER — SODIUM CHLORIDE 9 MG/ML
INJECTION, SOLUTION INTRAVENOUS CONTINUOUS
Status: DISCONTINUED | OUTPATIENT
Start: 2023-12-03 | End: 2023-12-05

## 2023-12-03 NOTE — ED INITIAL ASSESSMENT (HPI)
Patient presents for evaluation of diarrhea, dizziness, and shortness of breath. She had labs done last Saturday and found to have a low potassium and magnesium, her doctor had her start supplements and she began to have diarrhea after starting the supplements. She now reports weakness, shortness of breath, and dizziness. Denies nausea or fever.

## 2023-12-04 LAB
ANION GAP SERPL CALC-SCNC: 6 MMOL/L (ref 0–18)
ATRIAL RATE: 73 BPM
BUN BLD-MCNC: 8 MG/DL (ref 9–23)
C DIFF TOX B STL QL: NEGATIVE
CALCIUM BLD-MCNC: 7.7 MG/DL (ref 8.5–10.1)
CHLORIDE SERPL-SCNC: 117 MMOL/L (ref 98–112)
CO2 SERPL-SCNC: 22 MMOL/L (ref 21–32)
CREAT BLD-MCNC: 0.78 MG/DL
EGFRCR SERPLBLD CKD-EPI 2021: 84 ML/MIN/1.73M2 (ref 60–?)
ERYTHROCYTE [DISTWIDTH] IN BLOOD BY AUTOMATED COUNT: 12.8 %
GLUCOSE BLD-MCNC: 107 MG/DL (ref 70–99)
GLUCOSE BLD-MCNC: 109 MG/DL (ref 70–99)
GLUCOSE BLD-MCNC: 129 MG/DL (ref 70–99)
GLUCOSE BLD-MCNC: 89 MG/DL (ref 70–99)
GLUCOSE BLD-MCNC: 94 MG/DL (ref 70–99)
HCT VFR BLD AUTO: 29.3 %
HGB BLD-MCNC: 9.8 G/DL
MAGNESIUM SERPL-MCNC: 2.2 MG/DL (ref 1.6–2.6)
MCH RBC QN AUTO: 30.1 PG (ref 26–34)
MCHC RBC AUTO-ENTMCNC: 33.4 G/DL (ref 31–37)
MCV RBC AUTO: 89.9 FL
OSMOLALITY SERPL CALC.SUM OF ELEC: 299 MOSM/KG (ref 275–295)
P AXIS: 32 DEGREES
P-R INTERVAL: 158 MS
PLATELET # BLD AUTO: 228 10(3)UL (ref 150–450)
POTASSIUM SERPL-SCNC: 3.7 MMOL/L (ref 3.5–5.1)
POTASSIUM SERPL-SCNC: 3.7 MMOL/L (ref 3.5–5.1)
Q-T INTERVAL: 400 MS
QRS DURATION: 78 MS
QTC CALCULATION (BEZET): 440 MS
R AXIS: -5 DEGREES
RBC # BLD AUTO: 3.26 X10(6)UL
SODIUM SERPL-SCNC: 145 MMOL/L (ref 136–145)
T AXIS: 56 DEGREES
VENTRICULAR RATE: 73 BPM
WBC # BLD AUTO: 6.8 X10(3) UL (ref 4–11)

## 2023-12-04 PROCEDURE — 85027 COMPLETE CBC AUTOMATED: CPT | Performed by: HOSPITALIST

## 2023-12-04 PROCEDURE — 80048 BASIC METABOLIC PNL TOTAL CA: CPT | Performed by: HOSPITALIST

## 2023-12-04 PROCEDURE — 82962 GLUCOSE BLOOD TEST: CPT

## 2023-12-04 PROCEDURE — 87493 C DIFF AMPLIFIED PROBE: CPT

## 2023-12-04 PROCEDURE — 83735 ASSAY OF MAGNESIUM: CPT | Performed by: HOSPITALIST

## 2023-12-04 PROCEDURE — 84132 ASSAY OF SERUM POTASSIUM: CPT | Performed by: HOSPITALIST

## 2023-12-04 RX ORDER — POTASSIUM CHLORIDE 20 MEQ/1
40 TABLET, EXTENDED RELEASE ORAL ONCE
Status: COMPLETED | OUTPATIENT
Start: 2023-12-04 | End: 2023-12-04

## 2023-12-04 NOTE — PROGRESS NOTES
Pt arrived on unit via cart from ER. A/ox4, ambulatory and independent at baseline. Hx of low Mg and K with 3 hospitalizations in the last year. Mg and K replaced in ER. Admitted to unit, oriented to room. Call light within reach. Educated on call light use. Hospitalist notified for admission orders.

## 2023-12-04 NOTE — PLAN OF CARE
Patient alert and oriented x4. Room air. Telemetry monitoring. Lovenox for anticoagulation. Non-cardiac electrolyte protocol. Regular diet with accuchecks. CDIFF test returned negative. Isolation protocol discontinued. Nephrology consult placed. Plan is to discharge home with daughter when medically stable.

## 2023-12-04 NOTE — ED QUICK NOTES
Orders for admission, patient is aware of plan and ready to go upstairs. Any questions, please call ED KEISHA Sung  at extension [phone]. Vaccinated? Type of COVID test sent:  COVID Suspicion level: negative      Titratable drug(s) infusing:  none  LOC at time of transport: A+Ox4    Other pertinent information:    CIWA score=  NIH score=

## 2023-12-04 NOTE — PROGRESS NOTES
Mg 0.8 after 1 gram rider given in ER. Protocol asking RN to defer to MD for replacement. Paged hospitalist on call. Potassium at 3.2, ordered 36 Meq for replacement with redraw of level in am.     2336- MD ordered 4 gram rider for magnesium replacement.

## 2023-12-05 VITALS
HEIGHT: 68 IN | TEMPERATURE: 98 F | SYSTOLIC BLOOD PRESSURE: 186 MMHG | WEIGHT: 215 LBS | HEART RATE: 77 BPM | DIASTOLIC BLOOD PRESSURE: 82 MMHG | BODY MASS INDEX: 32.58 KG/M2 | OXYGEN SATURATION: 97 % | RESPIRATION RATE: 18 BRPM

## 2023-12-05 LAB
ALBUMIN SERPL-MCNC: 3.1 G/DL (ref 3.4–5)
ALBUMIN SERPL-MCNC: 3.2 G/DL (ref 3.4–5)
ANION GAP SERPL CALC-SCNC: 5 MMOL/L (ref 0–18)
ANION GAP SERPL CALC-SCNC: 7 MMOL/L (ref 0–18)
BUN BLD-MCNC: 7 MG/DL (ref 9–23)
BUN BLD-MCNC: 8 MG/DL (ref 9–23)
CALCIUM BLD-MCNC: 8 MG/DL (ref 8.5–10.1)
CALCIUM BLD-MCNC: 8.1 MG/DL (ref 8.5–10.1)
CHLORIDE SERPL-SCNC: 113 MMOL/L (ref 98–112)
CHLORIDE SERPL-SCNC: 115 MMOL/L (ref 98–112)
CO2 SERPL-SCNC: 23 MMOL/L (ref 21–32)
CO2 SERPL-SCNC: 26 MMOL/L (ref 21–32)
CREAT BLD-MCNC: 0.77 MG/DL
CREAT BLD-MCNC: 0.84 MG/DL
EGFRCR SERPLBLD CKD-EPI 2021: 77 ML/MIN/1.73M2 (ref 60–?)
EGFRCR SERPLBLD CKD-EPI 2021: 86 ML/MIN/1.73M2 (ref 60–?)
GLUCOSE BLD-MCNC: 109 MG/DL (ref 70–99)
GLUCOSE BLD-MCNC: 112 MG/DL (ref 70–99)
GLUCOSE BLD-MCNC: 112 MG/DL (ref 70–99)
GLUCOSE BLD-MCNC: 71 MG/DL (ref 70–99)
GLUCOSE BLD-MCNC: 88 MG/DL (ref 70–99)
MAGNESIUM SERPL-MCNC: 1.4 MG/DL (ref 1.6–2.6)
MAGNESIUM SERPL-MCNC: 1.8 MG/DL (ref 1.6–2.6)
OSMOLALITY SERPL CALC.SUM OF ELEC: 297 MOSM/KG (ref 275–295)
OSMOLALITY SERPL CALC.SUM OF ELEC: 299 MOSM/KG (ref 275–295)
PHOSPHATE SERPL-MCNC: 1.8 MG/DL (ref 2.5–4.9)
PHOSPHATE SERPL-MCNC: 3.3 MG/DL (ref 2.5–4.9)
POTASSIUM SERPL-SCNC: 3.6 MMOL/L (ref 3.5–5.1)
POTASSIUM SERPL-SCNC: 4 MMOL/L (ref 3.5–5.1)
SODIUM SERPL-SCNC: 144 MMOL/L (ref 136–145)
SODIUM SERPL-SCNC: 145 MMOL/L (ref 136–145)

## 2023-12-05 PROCEDURE — 83735 ASSAY OF MAGNESIUM: CPT | Performed by: INTERNAL MEDICINE

## 2023-12-05 PROCEDURE — 82962 GLUCOSE BLOOD TEST: CPT

## 2023-12-05 PROCEDURE — 80069 RENAL FUNCTION PANEL: CPT | Performed by: INTERNAL MEDICINE

## 2023-12-05 RX ORDER — ALPRAZOLAM 0.5 MG/1
0.5 TABLET ORAL 3 TIMES DAILY PRN
Status: DISCONTINUED | OUTPATIENT
Start: 2023-12-05 | End: 2023-12-05

## 2023-12-05 RX ORDER — LISINOPRIL 40 MG/1
40 TABLET ORAL DAILY
Status: DISCONTINUED | OUTPATIENT
Start: 2023-12-06 | End: 2023-12-05

## 2023-12-05 RX ORDER — MAGNESIUM OXIDE 400 MG/1
800 TABLET ORAL ONCE
Status: COMPLETED | OUTPATIENT
Start: 2023-12-05 | End: 2023-12-05

## 2023-12-05 RX ORDER — MAGNESIUM CHLORIDE 71.5 G/G
1 TABLET ORAL DAILY
Qty: 30 TABLET | Refills: 0 | Status: SHIPPED | OUTPATIENT
Start: 2023-12-05

## 2023-12-05 RX ORDER — BENAZEPRIL HYDROCHLORIDE 40 MG/1
40 TABLET, FILM COATED ORAL DAILY
Qty: 30 TABLET | Refills: 0 | Status: SHIPPED | OUTPATIENT
Start: 2023-12-05 | End: 2024-01-04

## 2023-12-05 RX ORDER — LISINOPRIL 20 MG/1
20 TABLET ORAL ONCE
Status: COMPLETED | OUTPATIENT
Start: 2023-12-05 | End: 2023-12-05

## 2023-12-05 NOTE — PLAN OF CARE
A&Ox 4. On room air. Telemetry monitoring. Tolerating diet. Voiding without difficulty. Plan is to discharge home when medically stable. Patient updated on plan of care. Safety precautions maintained. Call light within reach.

## 2023-12-05 NOTE — PLAN OF CARE
Pt up ad elvi. Tele monitoring in place. Loose stools improved. Kphos rider given per nephrology. Will recheck labs this afternoon and possible dc to home. Pt verbalized understanding of POC.

## 2023-12-06 NOTE — PLAN OF CARE
OK for pt to dc to home today. Written and verbal dc instructions given to pt. Verbalized understanding. Left hospital at 1730 as per order.

## 2024-01-22 ENCOUNTER — HOSPITAL ENCOUNTER (INPATIENT)
Facility: HOSPITAL | Age: 64
LOS: 1 days | Discharge: HOME | End: 2024-01-23
Attending: EMERGENCY MEDICINE | Admitting: INTERNAL MEDICINE
Payer: MEDICARE

## 2024-01-22 ENCOUNTER — APPOINTMENT (OUTPATIENT)
Dept: CT IMAGING | Facility: HOSPITAL | Age: 64
End: 2024-01-22
Attending: EMERGENCY MEDICINE
Payer: MEDICARE

## 2024-01-22 ENCOUNTER — APPOINTMENT (OUTPATIENT)
Dept: GENERAL RADIOLOGY | Facility: HOSPITAL | Age: 64
End: 2024-01-22
Attending: EMERGENCY MEDICINE
Payer: MEDICARE

## 2024-01-22 DIAGNOSIS — S09.90XA INJURY OF HEAD, INITIAL ENCOUNTER: ICD-10-CM

## 2024-01-22 DIAGNOSIS — S06.5XAA ACUTE SUBDURAL HEMATOMA (HCC): Primary | ICD-10-CM
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
## 2024-01-22 DIAGNOSIS — S01.01XA LACERATION OF SCALP, INITIAL ENCOUNTER: ICD-10-CM

## 2024-01-22 LAB
ALBUMIN SERPL-MCNC: 3.9 G/DL (ref 3.4–5)
ALBUMIN/GLOB SERPL: 1.3 {RATIO} (ref 1–2)
ALP LIVER SERPL-CCNC: 35 U/L
ANION GAP SERPL CALC-SCNC: 5 MMOL/L (ref 0–18)
AST SERPL-CCNC: 12 U/L (ref 15–37)
BASOPHILS # BLD AUTO: 0.05 X10(3) UL (ref 0–0.2)
BASOPHILS NFR BLD AUTO: 0.5 %
BILIRUB SERPL-MCNC: 0.4 MG/DL (ref 0.1–2)
BUN BLD-MCNC: 19 MG/DL (ref 9–23)
CALCIUM BLD-MCNC: 9.5 MG/DL (ref 8.5–10.1)
CHLORIDE SERPL-SCNC: 110 MMOL/L (ref 98–112)
CO2 SERPL-SCNC: 27 MMOL/L (ref 21–32)
CREAT BLD-MCNC: 0.89 MG/DL
EGFRCR SERPLBLD CKD-EPI 2021: 72 ML/MIN/1.73M2 (ref 60–?)
EOSINOPHIL # BLD AUTO: 0.09 X10(3) UL (ref 0–0.7)
EOSINOPHIL NFR BLD AUTO: 0.9 %
ERYTHROCYTE [DISTWIDTH] IN BLOOD BY AUTOMATED COUNT: 12.5 %
GLOBULIN PLAS-MCNC: 2.9 G/DL (ref 2.8–4.4)
GLUCOSE BLD-MCNC: 111 MG/DL (ref 70–99)
GLUCOSE BLD-MCNC: 87 MG/DL (ref 70–99)
HCT VFR BLD AUTO: 38.1 %
HGB BLD-MCNC: 13 G/DL
IMM GRANULOCYTES # BLD AUTO: 0.03 X10(3) UL (ref 0–1)
IMM GRANULOCYTES NFR BLD: 0.3 %
INR BLD: 1.07 (ref 0.8–1.2)
LYMPHOCYTES # BLD AUTO: 2.29 X10(3) UL (ref 1–4)
LYMPHOCYTES NFR BLD AUTO: 22.1 %
MCH RBC QN AUTO: 30.3 PG (ref 26–34)
MCHC RBC AUTO-ENTMCNC: 34.1 G/DL (ref 31–37)
MCV RBC AUTO: 88.8 FL
MONOCYTES # BLD AUTO: 0.6 X10(3) UL (ref 0.1–1)
MONOCYTES NFR BLD AUTO: 5.8 %
NEUTROPHILS # BLD AUTO: 7.32 X10 (3) UL (ref 1.5–7.7)
NEUTROPHILS # BLD AUTO: 7.32 X10(3) UL (ref 1.5–7.7)
NEUTROPHILS NFR BLD AUTO: 70.4 %
OSMOLALITY SERPL CALC.SUM OF ELEC: 296 MOSM/KG (ref 275–295)
PLATELET # BLD AUTO: 328 10(3)UL (ref 150–450)
POTASSIUM SERPL-SCNC: 4.1 MMOL/L (ref 3.5–5.1)
PROT SERPL-MCNC: 6.8 G/DL (ref 6.4–8.2)
PROTHROMBIN TIME: 14 SECONDS (ref 11.6–14.8)
RBC # BLD AUTO: 4.29 X10(6)UL
SODIUM SERPL-SCNC: 142 MMOL/L (ref 136–145)
WBC # BLD AUTO: 10.4 X10(3) UL (ref 4–11)

## 2024-01-22 PROCEDURE — 70450 CT HEAD/BRAIN W/O DYE: CPT | Performed by: EMERGENCY MEDICINE

## 2024-01-22 PROCEDURE — 72125 CT NECK SPINE W/O DYE: CPT | Performed by: EMERGENCY MEDICINE

## 2024-01-22 PROCEDURE — 73560 X-RAY EXAM OF KNEE 1 OR 2: CPT | Performed by: EMERGENCY MEDICINE

## 2024-01-22 RX ORDER — ATORVASTATIN CALCIUM 40 MG/1
40 TABLET, FILM COATED ORAL NIGHTLY
Status: DISCONTINUED | OUTPATIENT
Start: 2024-01-22 | End: 2024-01-23

## 2024-01-22 RX ORDER — METOPROLOL SUCCINATE 25 MG/1
25 TABLET, EXTENDED RELEASE ORAL NIGHTLY
Status: DISCONTINUED | OUTPATIENT
Start: 2024-01-22 | End: 2024-01-23

## 2024-01-22 RX ORDER — FENOFIBRATE 160 MG/1
160 TABLET ORAL DAILY
COMMUNITY

## 2024-01-22 RX ORDER — METOPROLOL SUCCINATE 25 MG/1
25 TABLET, EXTENDED RELEASE ORAL NIGHTLY
COMMUNITY

## 2024-01-22 RX ORDER — ACETAMINOPHEN 500 MG
500 TABLET ORAL EVERY 4 HOURS PRN
Status: DISCONTINUED | OUTPATIENT
Start: 2024-01-22 | End: 2024-01-23

## 2024-01-22 RX ORDER — VENLAFAXINE HYDROCHLORIDE 75 MG/1
225 CAPSULE, EXTENDED RELEASE ORAL NIGHTLY
Status: DISCONTINUED | OUTPATIENT
Start: 2024-01-22 | End: 2024-01-23

## 2024-01-22 RX ORDER — LISINOPRIL 40 MG/1
40 TABLET ORAL DAILY
Status: DISCONTINUED | OUTPATIENT
Start: 2024-01-23 | End: 2024-01-23

## 2024-01-22 RX ORDER — BUSPIRONE HYDROCHLORIDE 5 MG/1
10 TABLET ORAL 2 TIMES DAILY
Status: DISCONTINUED | OUTPATIENT
Start: 2024-01-22 | End: 2024-01-23

## 2024-01-22 RX ORDER — ALPRAZOLAM 0.5 MG/1
0.5 TABLET ORAL NIGHTLY PRN
Status: DISCONTINUED | OUTPATIENT
Start: 2024-01-22 | End: 2024-01-23

## 2024-01-22 RX ORDER — ARIPIPRAZOLE 5 MG/1
5 TABLET ORAL NIGHTLY
Status: DISCONTINUED | OUTPATIENT
Start: 2024-01-22 | End: 2024-01-23

## 2024-01-22 RX ORDER — PREGABALIN 50 MG/1
50 CAPSULE ORAL 3 TIMES DAILY
Status: DISCONTINUED | OUTPATIENT
Start: 2024-01-22 | End: 2024-01-23

## 2024-01-22 NOTE — H&P
[facility] H&P/Consult note       CC:   Chief Complaint   Patient presents with    Fall    Laceration/Abrasion        PCP: Coco Du DO    History of Present Illness: Patient is a 63 year old female with PMH sig for HTN, hLD, DM2, gerd, here for fall and bleeding    She was trying to dress and lost balance, fell backward striking her head on the tile floor. No syncope but was bleeding from back of head. Area sutured in ER.  She denied any dizziness /lh, visual changes. No n/v. No cp, palps  Also has pain in the left calf region since the fall    PMH  Past Medical History:   Diagnosis Date    Anesthesia complication     Anxiety state     Back pain     Back problem     Depression     Diabetes (HCC)     High blood pressure     High cholesterol     Neuropathy     Osteoarthritis     Other and unspecified hyperlipidemia     PONV (postoperative nausea and vomiting)     Unspecified essential hypertension     Visual impairment     glasses        PSH  Past Surgical History:   Procedure Laterality Date    ANESTH,SHOULDER REPLACEMENT      right    ARTHROTOMY,OPEN REPAIR MENISCUS Left 01/13/2020    BACK SURGERY  2000/2001    laminectomy L4-5-S1    CHOLECYSTECTOMY      EXCIS LUMBAR DISK,ONE LEVEL  2015    redo L5-S1 discectomy    HYSTERECTOMY  2004    partial    OTHER      AVM malformation with coil put in place right side of chest     SPINE SURGERY PROCEDURE UNLISTED      TOTAL KNEE REPLACEMENT Right 05/2022    R Knee Replacement        ALL:  Allergies   Allergen Reactions    Oxycodone NAUSEA ONLY, DIZZINESS and FACE FLUSHING     Nausea, dizziness          Home Medications:  Outpatient Medications Marked as Taking for the 1/22/24 encounter (Hospital Encounter)   Medication Sig Dispense Refill    ALPRAZolam 0.5 MG Oral Tab Take 1 tablet (0.5 mg total) by mouth nightly as needed for Sleep or Anxiety.      busPIRone 10 MG Oral Tab Take 1 tablet (10 mg total) by mouth in the morning and 1 tablet (10 mg total) before  bedtime.      atorvastatin 40 MG Oral Tab Take 1 tablet (40 mg total) by mouth nightly. 30 tablet 2    ARIPiprazole 5 MG Oral Tab Take 1 tablet (5 mg total) by mouth at bedtime.      TRAZODONE 150 MG Oral Tab Take 1 tablet (150 mg total) by mouth nightly. 90 tablet 0    pregabalin 50 MG Oral Cap Take 1 capsule (50 mg total) by mouth 3 (three) times daily. 90 capsule 1         Soc Hx  Social History     Tobacco Use    Smoking status: Former     Packs/day: 1.00     Years: 10.00     Additional pack years: 0.00     Total pack years: 10.00     Types: Cigarettes     Quit date: 3/25/2000     Years since quittin.8    Smokeless tobacco: Never   Substance Use Topics    Alcohol use: No        Fam Hx  Family History   Problem Relation Age of Onset    Cancer Father 79        Lung cancer    Cancer Son     Other (Other) Son         MVA    Cancer Maternal Grandmother         Skin cancer    Other (Other) Maternal Grandfather         TB    Heart Disorder Paternal Grandmother 75        Heart attack    Psychiatric Sister         Depression    Breast Cancer Sister 65        sister at age of 65    Heart Disorder Brother 56        Triple bypass    Hypertension Brother        Review of Systems  Comprehensive ROS reviewed and negative except for what's stated above.       OBJECTIVE:  BP (!) 163/62   Pulse 60   Temp 97 °F (36.1 °C)   Resp 18   Ht 5' 10\" (1.778 m)   Wt 224 lb (101.6 kg)   SpO2 99%   BMI 32.14 kg/m²   General:  Alert, no distress, appears stated age.   Head:  Normocephalic, without obvious abnormality, atraumatic.   Eyes:  Sclera anicteric, No conjunctival pallor, EOMs intact.    Throat: MMM   Neck: Supple    Lungs:   Clear to auscultation bilaterally. Normal effort   Chest wall:  No tenderness or deformity.   Heart:  Regular rate and rhyth    Abdomen:   Soft, NT/ND, +bs   Extremities: Atraumatic, no cyanosis or edema.  No calf ttp, no bruising. Good distal pulses   Skin: No visible rashes or lesions.     Neurologic: Normal strength, no focal deficit appreciated  Cn 2-12 grossly intact, no drift, normal ftn     Diagnostic Data:    CBC/Chem  No results for input(s): \"WBC\", \"HGB\", \"MCV\", \"PLT\", \"BAND\", \"INR\" in the last 168 hours.    Invalid input(s): \"LYM#\", \"MONO#\", \"BASOS#\", \"EOSIN#\"    Recent Labs   Lab 01/22/24  1603      K 4.1      CO2 27.0   BUN 19   CREATSERUM 0.89   GLU 87   CA 9.5       Recent Labs   Lab 01/22/24  1603   AST 12*   ALB 3.9       No results for input(s): \"TROP\" in the last 168 hours.       Radiology: CT SPINE CERVICAL (CPT=72125)    Result Date: 1/22/2024  PROCEDURE:  CT SPINE CERVICAL (CPT=72125)  COMPARISON:  None.  INDICATIONS:  neck pain after fall with posterior head injury  TECHNIQUE:  Noncontrast CT scanning of the cervical spine is performed from the skull base through C7.  Multiplanar reconstructions are generated.  Dose reduction techniques were used. Dose information is transmitted to the ACR (American College of Radiology) NRDR (National Radiology Data Registry) which includes the Dose Index Registry.  PATIENT STATED HISTORY: (As transcribed by Technologist)  Pt states about 12pm she was leaning over putting her pants on when she lost her balance and fell straight back hitting her head on a tile floor. Pt denies LOC, N/V. Aprrox 1 inch abrasion noted to back of scalp. Bleeding controlled at this time. Pt denies blood thinner use.     FINDINGS:  CRANIOCERVICAL AREA:  Normal foramen magnum with no Chiari malformation. PARASPINAL AREA:  Normal with no visible mass.  BONES:  No fracture, pars defect, or osseous lesion.  CERVICAL DISC LEVELS: C2-C3:  No significant disc/facet abnormality, spinal stenosis, or foraminal stenosis. C3-C4:  Left greater than right facet joint hypertrophy results in severe left neural foraminal stenosis.  The right neural foramina and spinal canal are patent C4-C5:  No significant disc/facet abnormality, spinal stenosis, or foraminal stenosis.  C5-C6:  Left greater than right facet joint hypertrophy results in mild-to-moderate bilateral neural foraminal stenosis.  The spinal canal is patent C6-C7:  Facet and uncovertebral joint hypertrophy results in mild-to-moderate bilateral neural foraminal stenosis.  Spinal canal is patent C7-T1:  No significant disc/facet abnormality, spinal stenosis, or foraminal stenosis.            CONCLUSION:  Multilevel degenerative changes.  No acute fracture in the cervical spine.    LOCATION:  [facility]   Dictated by (CST): Duke Mandujano MD on 1/22/2024 at 3:49 PM     Finalized by (CST): Duke Mandujano MD on 1/22/2024 at 3:52 PM       CT BRAIN OR HEAD ([id number])    Result Date: 1/22/2024  PROCEDURE:  CT BRAIN OR HEAD (26135)  COMPARISON:  [facility] , CT, CT BRAIN OR HEAD ([id number]), 8/28/2018, 0:35 AM.  INDICATIONS:  trip and fall- lac to posterior scalp. no loc or vomiting. no thinners. bleeding controlled.  Head trauma.  TECHNIQUE:  Noncontrast CT scanning is performed through the brain. Dose reduction techniques were used. Dose information is transmitted to the ACR (American College of Radiology) NRDR (National Radiology Data Registry) which includes the Dose Index Registry.  PATIENT STATED HISTORY: (As transcribed by Technologist)  Pt states about 12pm she was leaning over putting her pants on when she lost her balance and fell straight back hitting her head on a tile floor. Pt denies LOC, N/V. Aprrox 1 inch abrasion noted to back of scalp. Bleeding controlled at this time. Pt denies blood thinner use.     FINDINGS:  Subtle parafalcine subdural hematoma is noted measuring up to 2 millimeters in thickness.  No significant mass effect is noted.  There is no midline shift.  No abnormal extra-axial collection is identified.  No large vascular distribution infarction is noted.  No intraparenchymal hemorrhage is identified.  No hydrocephalus.  Basal cisterns are patent.  No acute fracture is identified.  Mastoid air cells and paranasal  sinuses are clear.            CONCLUSION:  1. Subdural hematoma along the falx  This report was communicated by telephone to Dr. Aguilar at the dictation time shown below.   LOCATION:  [facility]   Dictated by (CST): Duke Mandujano MD on 1/22/2024 at 3:44 PM     Finalized by (CST): Duke Mandujano MD on 1/22/2024 at 3:48 PM          ASSESSMENT / PLAN:     Patient is a 63 year old female with PMH sig for HTN, hLD, DM2, gerd, here for fall and bleeding    Impression    -fall from standing level resulting in traumatin subdural hematoma   -HTN  -HLD  -DM 2  -GERD    Plan    -NSG consulted, serial imaging per nsg. Neurochecks  Bp control. Resume home meds  Cont statin   Iss /accuchecks    Scds  Hold ac      Further recommendations pending patient's clinical course. Get hospitalist to continue to follow patient while in house    Patient and/or patient's family given opportunity to ask questions and note understanding and agreeing with therapeutic plan as outlined    Helio Cotton Hospitalist  [phone]  Answering Service: [phone]

## 2024-01-22 NOTE — ED INITIAL ASSESSMENT (HPI)
Pt states about 12pm she was leaning over putting her pants on when she lost her balance and fell straight back hitting her head on a tile floor. Pt denies LOC, N/V. Aprrox 1 inch abrasion noted to back of scalp. Bleeding controlled at this time.  Pt denies blood thinner use.

## 2024-01-22 NOTE — ED PROVIDER NOTES
Patient Seen in: [facility] Emergency Department      History     Chief Complaint   Patient presents with    Fall    Laceration/Abrasion     Stated Complaint: trip and fall- lac to posterior scalp. no loc or vomiting. no thinners. bleedin*    Subjective:   HPI  This is a 63-year-old woman, history of hypertension diabetes, here for evaluation of mechanical fall and head injury.  She states she was trying get her pants on lost her balance fell backward and struck her head, there was no LOC no vomiting reports mild neck discomfort also some mild discomfort to the lower lateral aspect of her left knee.  Able to bear weight and ambulate no other complaints or concerns.    Objective:   Past Medical History:   Diagnosis Date    Anesthesia complication     Anxiety state     Back pain     Back problem     Depression     Diabetes (HCC)     High blood pressure     High cholesterol     Neuropathy     Osteoarthritis     Other and unspecified hyperlipidemia     PONV (postoperative nausea and vomiting)     Unspecified essential hypertension     Visual impairment     glasses              Past Surgical History:   Procedure Laterality Date    ANESTH,SHOULDER REPLACEMENT      right    ARTHROTOMY,OPEN REPAIR MENISCUS Left 2020    BACK SURGERY      laminectomy L4-5-S1    CHOLECYSTECTOMY      EXCIS LUMBAR DISK,ONE LEVEL  2015    redo L5-S1 discectomy    HYSTERECTOMY  2004    partial    OTHER      AVM malformation with coil put in place right side of chest     SPINE SURGERY PROCEDURE UNLISTED      TOTAL KNEE REPLACEMENT Right 2022    R Knee Replacement                Social History     Socioeconomic History    Marital status:    Tobacco Use    Smoking status: Former     Packs/day: 1.00     Years: 10.00     Additional pack years: 0.00     Total pack years: 10.00     Types: Cigarettes     Quit date: 3/25/2000     Years since quittin.8    Smokeless tobacco: Never   Vaping Use    Vaping Use: Never used    Substance and Sexual Activity    Alcohol use: No    Drug use: No    Sexual activity: Not Currently     Partners: Male     Social Determinants of Health     Food Insecurity: No Food Insecurity (12/3/2023)    Food Insecurity     Food Insecurity: Never true   Transportation Needs: No Transportation Needs (12/3/2023)    Transportation Needs     Lack of Transportation: No   Housing Stability: Low Risk  (12/3/2023)    Housing Stability     Housing Instability: No              Review of Systems    Positive for stated complaint: trip and fall- lac to posterior scalp. no loc or vomiting. no thinners. bleedin*  Other systems are as noted in HPI.  Constitutional and vital signs reviewed.      All other systems reviewed and negative except as noted above.    Physical Exam     ED Triage Vitals [01/22/24 1304]   /87   Pulse 69   Resp 18   Temp 97 °F (36.1 °C)   Temp src    SpO2 97 %   O2 Device None (Room air)       Current:BP (!) 163/62   Pulse 60   Temp 97 °F (36.1 °C)   Resp 18   Ht 177.8 cm (5' 10\")   Wt 101.6 kg   SpO2 99%   BMI 32.14 kg/m²         Physical Exam        Physical Exam  Vitals signs and nursing note reviewed.   General: Well-appearing woman sitting in wheelchair no acute distress,   Head: Normocephalic there is a 0.5 cm full-thickness laceration of the occiput of the scalp no active bleeding no foreign body visualized   Neck: No midline tenderness, reports mild discomfort with range of motion.  HEENT:  Mucous membranes are moist.   Cardiovascular:  Normal rate and regular rhythm.  No Edema  Pulmonary:  Pulmonary effort is normal.  Normal breath sounds. No wheezing, rhonchi or rales.   Abdominal: Soft nontender nondistended, normal bowel sounds, no guarding no rebound tenderness  Musculoskeletal: Left knee mild discomfort to palpation over the lateral aspect, no gross instability with valgus or varus stress, anterior and posterior drawer test are negative, patient able to flex to 90 degrees fully  extend the left knee able to bear weight and ambulate.  Skin: Warm and dry  Neurological: Awake alert, speech is normal, motor 5/5 in bilateral upper and lower extremities.  sensation is grossly intact throughout.  No facial asymmetry.  Stable narrow based gait.        ED Course     Labs Reviewed   COMP METABOLIC PANEL (14) - Abnormal; Notable for the following components:       Result Value    Calculated Osmolality 296 (*)     AST 12 (*)     Alkaline Phosphatase 35 (*)     All other components within normal limits   PROTHROMBIN TIME (PT) - Normal   CBC WITH DIFFERENTIAL WITH PLATELET    Narrative:     The following orders were created for panel order CBC With Differential With Platelet.  Procedure                               Abnormality         Status                     ---------                               -----------         ------                     CBC W/ DIFFERENTIAL[[id number]]                              Final result                 Please view results for these tests on the individual orders.   CBC W/ DIFFERENTIAL           XR KNEE (1 OR 2 VIEWS), LEFT (CPT=73560)    Result Date: 1/22/2024  PROCEDURE:  XR KNEE (1 OR 2 VIEWS), LEFT (CPT=73560)  COMPARISON:  [facility] , XR, XR KNEE (1 OR 2 VIEWS), LEFT (CPT=73560), 12/01/2022, 2:35 PM.  INDICATIONS:  L knee pain after trip and fall- lac to posterior scalp. no loc or vomiting. no thinners. bleeding controlled  PATIENT STATED HISTORY: (As transcribed by Technologist)  Pt. with  left  knee pain after trip and fall- lac to posterior scalp.    FINDINGS:  There is anterior knee soft tissue swelling.  No acute displaced osseous fracture is identified.  No hardware fracture is evident.  No significant joint effusion.             CONCLUSION:  See above.   LOCATION:  [id number]   Dictated by (CST): Stromberg, LeRoy, MD on 1/22/2024 at 5:11 PM     Finalized by (CST): Stromberg, LeRoy, MD on 1/22/2024 at 5:12 PM       CT SPINE CERVICAL (CPT=72125)    Result Date:  1/22/2024  PROCEDURE:  CT SPINE CERVICAL (CPT=72125)  COMPARISON:  None.  INDICATIONS:  neck pain after fall with posterior head injury  TECHNIQUE:  Noncontrast CT scanning of the cervical spine is performed from the skull base through C7.  Multiplanar reconstructions are generated.  Dose reduction techniques were used. Dose information is transmitted to the ACR (American College of Radiology) NRDR (National Radiology Data Registry) which includes the Dose Index Registry.  PATIENT STATED HISTORY: (As transcribed by Technologist)  Pt states about 12pm she was leaning over putting her pants on when she lost her balance and fell straight back hitting her head on a tile floor. Pt denies LOC, N/V. Aprrox 1 inch abrasion noted to back of scalp. Bleeding controlled at this time. Pt denies blood thinner use.     FINDINGS:  CRANIOCERVICAL AREA:  Normal foramen magnum with no Chiari malformation. PARASPINAL AREA:  Normal with no visible mass.  BONES:  No fracture, pars defect, or osseous lesion.  CERVICAL DISC LEVELS: C2-C3:  No significant disc/facet abnormality, spinal stenosis, or foraminal stenosis. C3-C4:  Left greater than right facet joint hypertrophy results in severe left neural foraminal stenosis.  The right neural foramina and spinal canal are patent C4-C5:  No significant disc/facet abnormality, spinal stenosis, or foraminal stenosis. C5-C6:  Left greater than right facet joint hypertrophy results in mild-to-moderate bilateral neural foraminal stenosis.  The spinal canal is patent C6-C7:  Facet and uncovertebral joint hypertrophy results in mild-to-moderate bilateral neural foraminal stenosis.  Spinal canal is patent C7-T1:  No significant disc/facet abnormality, spinal stenosis, or foraminal stenosis.            CONCLUSION:  Multilevel degenerative changes.  No acute fracture in the cervical spine.    LOCATION:  [facility]   Dictated by (CST): Duke Mandujano MD on 1/22/2024 at 3:49 PM     Finalized by (CST): Duke  MD Delbert on 1/22/2024 at 3:52 PM       CT BRAIN OR HEAD ([id number])    Result Date: 1/22/2024  PROCEDURE:  CT BRAIN OR HEAD ([id number])  COMPARISON:  [facility] , CT, CT BRAIN OR HEAD ([id number]), 8/28/2018, 0:35 AM.  INDICATIONS:  trip and fall- lac to posterior scalp. no loc or vomiting. no thinners. bleeding controlled.  Head trauma.  TECHNIQUE:  Noncontrast CT scanning is performed through the brain. Dose reduction techniques were used. Dose information is transmitted to the ACR (American College of Radiology) NRDR (National Radiology Data Registry) which includes the Dose Index Registry.  PATIENT STATED HISTORY: (As transcribed by Technologist)  Pt states about 12pm she was leaning over putting her pants on when she lost her balance and fell straight back hitting her head on a tile floor. Pt denies LOC, N/V. Aprrox 1 inch abrasion noted to back of scalp. Bleeding controlled at this time. Pt denies blood thinner use.     FINDINGS:  Subtle parafalcine subdural hematoma is noted measuring up to 2 millimeters in thickness.  No significant mass effect is noted.  There is no midline shift.  No abnormal extra-axial collection is identified.  No large vascular distribution infarction is noted.  No intraparenchymal hemorrhage is identified.  No hydrocephalus.  Basal cisterns are patent.  No acute fracture is identified.  Mastoid air cells and paranasal sinuses are clear.            CONCLUSION:  1. Subdural hematoma along the falx  This report was communicated by telephone to Dr. Aguilar at the dictation time shown below.   LOCATION:  [facility]   Dictated by (CST): Duke Mandujano MD on 1/22/2024 at 3:44 PM     Finalized by (CST): Duke Mandujnao MD on 1/22/2024 at 3:48 PM               [facility]                                       Medical Decision Making  63-year-old woman here for evaluation after mechanical fall, posterior head injury.  Laceration was repaired, will verify tetanus status.  Differential includes acute intracranial hemorrhage,  skull fracture cervical spine fracture knee fracture versus contusion.  Will obtain CT of the head cervical spine, x-ray of the left knee reevaluate    CT of the head shows small subdural hematoma, patient is neurologically intact, takes no anticoagulants antiplatelets.  Blood pressure 140s over 80s, will admit to the CTU for further monitoring evaluation, case discussed with neurosurgery Dr. Saleh who agrees with plan Case endorsed to the [facility] hospitalist who agrees with plan.    Problems Addressed:  Acute subdural hematoma (HCC): acute illness or injury  Fall, initial encounter: acute illness or injury  Injury of head, initial encounter: acute illness or injury  Laceration of scalp, initial encounter: acute illness or injury    Amount and/or Complexity of Data Reviewed  Independent Historian:      Details: Daughter at bedside  Labs: ordered. Decision-making details documented in ED Course.  Radiology: ordered and independent interpretation performed. Decision-making details documented in ED Course.     Details: I independently viewed and interpreted the following imaging: CT of the head with no acute intracranial hemorrhage    Risk  Decision regarding hospitalization.        Disposition and Plan     Clinical Impression:  1. Acute subdural hematoma (HCC)    2. Fall, initial encounter    3. Injury of head, initial encounter    4. Laceration of scalp, initial encounter         Disposition:  Admit  1/22/2024  3:49 pm    Follow-up:  No follow-up provider specified.        Medications Prescribed:  Current Discharge Medication List                            Hospital Problems       Present on Admission  Date Reviewed: 4/28/2023            ICD-10-CM Noted POA    * (Principal) Acute subdural hematoma (HCC) S06.5XAA 1/22/2024 Unknown

## 2024-01-23 ENCOUNTER — APPOINTMENT (OUTPATIENT)
Dept: CT IMAGING | Facility: HOSPITAL | Age: 64
End: 2024-01-23
Attending: PHYSICIAN ASSISTANT
Payer: MEDICARE

## 2024-01-23 VITALS
OXYGEN SATURATION: 98 % | SYSTOLIC BLOOD PRESSURE: 133 MMHG | RESPIRATION RATE: 18 BRPM | HEART RATE: 66 BPM | WEIGHT: 224 LBS | DIASTOLIC BLOOD PRESSURE: 66 MMHG | BODY MASS INDEX: 32.07 KG/M2 | TEMPERATURE: 99 F | HEIGHT: 70 IN

## 2024-01-23 LAB
ANION GAP SERPL CALC-SCNC: 4 MMOL/L (ref 0–18)
BUN BLD-MCNC: 19 MG/DL (ref 9–23)
CALCIUM BLD-MCNC: 8.8 MG/DL (ref 8.5–10.1)
CHLORIDE SERPL-SCNC: 114 MMOL/L (ref 98–112)
CO2 SERPL-SCNC: 25 MMOL/L (ref 21–32)
CREAT BLD-MCNC: 0.81 MG/DL
EGFRCR SERPLBLD CKD-EPI 2021: 81 ML/MIN/1.73M2 (ref 60–?)
ERYTHROCYTE [DISTWIDTH] IN BLOOD BY AUTOMATED COUNT: 12.6 %
GLUCOSE BLD-MCNC: 92 MG/DL (ref 70–99)
GLUCOSE BLD-MCNC: 93 MG/DL (ref 70–99)
GLUCOSE BLD-MCNC: 99 MG/DL (ref 70–99)
HCT VFR BLD AUTO: 33.6 %
HGB BLD-MCNC: 11.5 G/DL
MAGNESIUM SERPL-MCNC: 2.4 MG/DL (ref 1.6–2.6)
MCH RBC QN AUTO: 30.7 PG (ref 26–34)
MCHC RBC AUTO-ENTMCNC: 34.2 G/DL (ref 31–37)
MCV RBC AUTO: 89.8 FL
OSMOLALITY SERPL CALC.SUM OF ELEC: 298 MOSM/KG (ref 275–295)
PLATELET # BLD AUTO: 240 10(3)UL (ref 150–450)
POTASSIUM SERPL-SCNC: 3.8 MMOL/L (ref 3.5–5.1)
RBC # BLD AUTO: 3.74 X10(6)UL
SODIUM SERPL-SCNC: 143 MMOL/L (ref 136–145)
WBC # BLD AUTO: 6.4 X10(3) UL (ref 4–11)

## 2024-01-23 PROCEDURE — 99222 1ST HOSP IP/OBS MODERATE 55: CPT | Performed by: NEUROLOGICAL SURGERY

## 2024-01-23 PROCEDURE — 70450 CT HEAD/BRAIN W/O DYE: CPT | Performed by: PHYSICIAN ASSISTANT

## 2024-01-23 RX ORDER — BENAZEPRIL HYDROCHLORIDE 40 MG/1
20 TABLET ORAL DAILY
Status: SHIPPED | COMMUNITY
Start: 2024-01-23

## 2024-01-23 RX ORDER — OMEPRAZOLE 40 MG/1
40 CAPSULE, DELAYED RELEASE ORAL DAILY
COMMUNITY
End: 2024-01-29 | Stop reason: ALTCHOICE

## 2024-01-23 NOTE — PLAN OF CARE
Assumed care at 0730  Patient alert, oriented x4  Q4 neuro checks, no deficits noted  VSS, RA, NSR on tele  Repeat CT done  Denies pain  Up SB assist  Voiding in bathroom  Tolerating diet well  Call light within reach    AVS reviewed with patient and family  All questions answered  Belongings sent with daughter    NURSING DISCHARGE NOTE  Discharged Home via Wheelchair.  Accompanied by Family member and Support staff  Belongings Taken by patient/family.

## 2024-01-23 NOTE — PROGRESS NOTES
Assumed care at 1930  A/Ox4  Rm Air  NSR on tele  Denies pain  Neuro assessment without deficits  POC discussed with patient and all questions answered.  Call light in reach and safety precautions in place.

## 2024-01-23 NOTE — DISCHARGE SUMMARY
[facility] Discharge Summary    Patient ID  Shalonda Batista  [id number]  63 year old  5/8/1960    Admit date: 1/22/2024    Discharge date:  01/23/24    Attending: Steven Solis MD     Primary Care Physician: Coco Du DO      Reason for admission: SDH    Discharge condition: stable    Disposition: home    Important follow up:  -PCP within 7 d  -specialists:      -labs:    -radiology:       Additional patient instructions       Discharge med list     Medication List        CONTINUE taking these medications      Needles & Syringes Misc  Use to inject B12            ASK your doctor about these medications      ALPRAZolam 0.5 MG Tabs  Commonly known as: Xanax     ARIPiprazole 5 MG Tabs  Commonly known as: Abilify     atorvastatin 40 MG Tabs  Commonly known as: Lipitor  Take 1 tablet (40 mg total) by mouth nightly.     Benazepril HCl 40 MG Tabs  Commonly known as: LOTENSIN  Take 1 tablet (40 mg total) by mouth daily.  Ask about: Should I take this medication?     busPIRone 10 MG Tabs  Commonly known as: Buspar     Fenofibrate 160 MG Tabs     metoprolol succinate ER 25 MG Tb24  Commonly known as: Toprol XL     pregabalin 50 MG Caps  Commonly known as: Lyrica  Take 1 capsule (50 mg total) by mouth 3 (three) times daily.     traZODone 150 MG Tabs  Commonly known as: Desyrel  Take 1 tablet (150 mg total) by mouth nightly.     venlafaxine ER 75 MG Cp24  Commonly known as: Effexor-XR  Take 3 capsules (225 mg total) by mouth daily.              Discharge Diagnoses:    -fall from standing level resulting in traumatin subdural hematoma   -HTN  -HLD  -DM 2  -GERD  Scalp laceration     Consults:  IP CONSULT TO HOSPITALIST  IP CONSULT TO NEUROSURGERY    Radiology:  CT BRAIN OR HEAD ([id number])    Result Date: 1/23/2024  PROCEDURE:  CT BRAIN OR HEAD ([id number])  COMPARISON:  [facility], CT, CT BRAIN OR HEAD ([id number]), 1/22/2024, 3:23 PM.  INDICATIONS:  SDH interval scan to monitor  TECHNIQUE:  Noncontrast CT scanning is  performed through the brain. Dose reduction techniques were used. Dose information is transmitted to the ACR (American College of Radiology) NRDR (National Radiology Data Registry) which includes the Dose Index Registry.  PATIENT STATED HISTORY: (As transcribed by Technologist)  Patient is here for interval scan to monitor SDH.    FINDINGS:  Ventricles and sulci are within normal limits.  Trace age indeterminate small vessel ischemic disease.  There is no midline shift or mass-effect.  The basal cisterns are patent.  The gray-white matter differentiation is intact.  There is a 1 mm thick subdural hemorrhage at the anterior falx, improved since 1/22/2024.  No new region of intracranial hemorrhage is identified.  There is no evident fracture.  The visualized paranasal sinuses and mastoid air cells are unremarkable.            CONCLUSION:   1. There is a 1 mm thick subdural hemorrhage at the anterior falx, improved since 1/22/2024.  No new region of intracranial hemorrhage is identified.  2. Trace age indeterminate small vessel ischemic disease. If there is clinical concern for acute ischemia/infarction, an MRI of the brain would be recommended for further evaluation.   LOCATION:  [facility]   Dictated by (CST): Stromberg, LeRoy, MD on 1/23/2024 at 9:00 AM     Finalized by (CST): Stromberg, LeRoy, MD on 1/23/2024 at 9:04 AM       XR KNEE (1 OR 2 VIEWS), LEFT (CPT=73560)    Result Date: 1/22/2024  PROCEDURE:  XR KNEE (1 OR 2 VIEWS), LEFT (CPT=73560)  COMPARISON:  [facility] , XR, XR KNEE (1 OR 2 VIEWS), LEFT (CPT=73560), 12/01/2022, 2:35 PM.  INDICATIONS:  L knee pain after trip and fall- lac to posterior scalp. no loc or vomiting. no thinners. bleeding controlled  PATIENT STATED HISTORY: (As transcribed by Technologist)  Pt. with  left  knee pain after trip and fall- lac to posterior scalp.    FINDINGS:  There is anterior knee soft tissue swelling.  No acute displaced osseous fracture is identified.  No hardware fracture is  evident.  No significant joint effusion.             CONCLUSION:  See above.   LOCATION:  [id number]   Dictated by (CST): Stromberg, LeRoy, MD on 1/22/2024 at 5:11 PM     Finalized by (CST): Stromberg, LeRoy, MD on 1/22/2024 at 5:12 PM       CT SPINE CERVICAL (CPT=72125)    Result Date: 1/22/2024  PROCEDURE:  CT SPINE CERVICAL (CPT=72125)  COMPARISON:  None.  INDICATIONS:  neck pain after fall with posterior head injury  TECHNIQUE:  Noncontrast CT scanning of the cervical spine is performed from the skull base through C7.  Multiplanar reconstructions are generated.  Dose reduction techniques were used. Dose information is transmitted to the ACR (American College of Radiology) NRDR (National Radiology Data Registry) which includes the Dose Index Registry.  PATIENT STATED HISTORY: (As transcribed by Technologist)  Pt states about 12pm she was leaning over putting her pants on when she lost her balance and fell straight back hitting her head on a tile floor. Pt denies LOC, N/V. Aprrox 1 inch abrasion noted to back of scalp. Bleeding controlled at this time. Pt denies blood thinner use.     FINDINGS:  CRANIOCERVICAL AREA:  Normal foramen magnum with no Chiari malformation. PARASPINAL AREA:  Normal with no visible mass.  BONES:  No fracture, pars defect, or osseous lesion.  CERVICAL DISC LEVELS: C2-C3:  No significant disc/facet abnormality, spinal stenosis, or foraminal stenosis. C3-C4:  Left greater than right facet joint hypertrophy results in severe left neural foraminal stenosis.  The right neural foramina and spinal canal are patent C4-C5:  No significant disc/facet abnormality, spinal stenosis, or foraminal stenosis. C5-C6:  Left greater than right facet joint hypertrophy results in mild-to-moderate bilateral neural foraminal stenosis.  The spinal canal is patent C6-C7:  Facet and uncovertebral joint hypertrophy results in mild-to-moderate bilateral neural foraminal stenosis.  Spinal canal is patent C7-T1:  No  significant disc/facet abnormality, spinal stenosis, or foraminal stenosis.            CONCLUSION:  Multilevel degenerative changes.  No acute fracture in the cervical spine.    LOCATION:  [id number]   Dictated by (CST): Duke Mandujano MD on 1/22/2024 at 3:49 PM     Finalized by (CST): Duke Mandujano MD on 1/22/2024 at 3:52 PM       CT BRAIN OR HEAD ([id number])    Result Date: 1/22/2024  PROCEDURE:  CT BRAIN OR HEAD ([id number])  COMPARISON:  [facility] , CT, CT BRAIN OR HEAD ([id number]), 8/28/2018, 0:35 AM.  INDICATIONS:  trip and fall- lac to posterior scalp. no loc or vomiting. no thinners. bleeding controlled.  Head trauma.  TECHNIQUE:  Noncontrast CT scanning is performed through the brain. Dose reduction techniques were used. Dose information is transmitted to the ACR (American College of Radiology) NRDR (National Radiology Data Registry) which includes the Dose Index Registry.  PATIENT STATED HISTORY: (As transcribed by Technologist)  Pt states about 12pm she was leaning over putting her pants on when she lost her balance and fell straight back hitting her head on a tile floor. Pt denies LOC, N/V. Aprrox 1 inch abrasion noted to back of scalp. Bleeding controlled at this time. Pt denies blood thinner use.     FINDINGS:  Subtle parafalcine subdural hematoma is noted measuring up to 2 millimeters in thickness.  No significant mass effect is noted.  There is no midline shift.  No abnormal extra-axial collection is identified.  No large vascular distribution infarction is noted.  No intraparenchymal hemorrhage is identified.  No hydrocephalus.  Basal cisterns are patent.  No acute fracture is identified.  Mastoid air cells and paranasal sinuses are clear.            CONCLUSION:  1. Subdural hematoma along the falx  This report was communicated by telephone to Dr. Aguilar at the dictation time shown below.   LOCATION:  [id number]   Dictated by (CST): Duke Mandujano MD on 1/22/2024 at 3:44 PM     Finalized by (CST): Duke Mandujano MD on  1/22/2024 at 3:48 PM         Operative reports:      Hospital course:    Patient is a 63 year old female with PMH sig for HTN, hLD, DM2, gerd, here for fall and bleeding     Found to have small SDH  Not on any AC / AP  Repeat CT head with improvement  Seen by [facility] - ok to dc, no fu needed    Remove sutures later next week      Day of discharge exam:  Vitals:    01/23/24 0817   BP: 131/74   Pulse: 65   Resp: 18   Temp: 98.5 °F (36.9 °C)     No headache, n/v  Ambuilating    No acute distress, alert and oriented   Lungs clear  Heart regular  Abdomen benign    Total time coordinating care 32 min      Patient and/or family had opportunity to ask questions and expressed understanding and agreement with therapeutic plan as outlined         Helio Cotton Hospitalist  [phone]  Answering Service: [phone]

## 2024-01-23 NOTE — CONSULTS
[facility] Consult    Shalonda Batista Patient Status:  Inpatient    1960 MRN [id number]   [facility] 7NE-A Attending Steven Solis MD   Hosp Day # 1 PCP Coco Du DO     REASON FOR CONSULTATION: SDH, s/p fall     HISTORY OF PRESENT ILLNESS:  Shalonda Batista is a(n) 63 year old female with a prior past medical history of hyperlipidemia, diabetes type 2, hypertension.  Patient endorses no daily use of anticoagulation/antiplatelet therapy.    She presents to the ED status post fall.  She lost her balance in the bathroom.  She states her pants got caught around her legs.  She fell and struck her head.  No reported loss of consciousness.    She currently endorses no headaches, speech issues, visual or hearing complaints, no altered mental status.  No neck pain.  No extremity numbness, tingling, pain or weakness.    PAST MEDICAL HISTORY:  Past Medical History:   Diagnosis Date    Anesthesia complication     Anxiety state     Back pain     Back problem     Depression     Diabetes (HCC)     High blood pressure     High cholesterol     Neuropathy     Osteoarthritis     Other and unspecified hyperlipidemia     PONV (postoperative nausea and vomiting)     Unspecified essential hypertension     Visual impairment     glasses       PAST SURGICAL HISTORY:  Past Surgical History:   Procedure Laterality Date    ANESTH,SHOULDER REPLACEMENT      right    ARTHROTOMY,OPEN REPAIR MENISCUS Left 2020    BACK SURGERY      laminectomy L4-5-S1    CHOLECYSTECTOMY      EXCIS LUMBAR DISK,ONE LEVEL  2015    redo L5-S1 discectomy    HYSTERECTOMY      partial    OTHER      AVM malformation with coil put in place right side of chest     SPINE SURGERY PROCEDURE UNLISTED      TOTAL KNEE REPLACEMENT Right 2022    R Knee Replacement       FAMILY HISTORY:  family history includes Breast Cancer (age of onset: 65) in her sister; Cancer in her maternal grandmother and son; Cancer (age of onset:  79) in her father; Heart Disorder (age of onset: 56) in her brother; Heart Disorder (age of onset: 75) in her paternal grandmother; Hypertension in her brother; Other in her maternal grandfather and son; Psychiatric in her sister.    SOCIAL HISTORY:   reports that she quit smoking about 23 years ago. Her smoking use included cigarettes. She has a 10 pack-year smoking history. She has never used smokeless tobacco. She reports that she does not drink alcohol and does not use drugs.    ALLERGIES:  Allergies   Allergen Reactions    Oxycodone NAUSEA ONLY, DIZZINESS and FACE FLUSHING     Nausea, dizziness         MEDICATIONS:  Medications Prior to Admission   Medication Sig Dispense Refill Last Dose    metoprolol succinate ER 25 MG Oral Tablet 24 Hr Take 1 tablet (25 mg total) by mouth at bedtime.   2024    Fenofibrate 160 MG Oral Tab Take 1 tablet (160 mg total) by mouth daily.   2024    ALPRAZolam 0.5 MG Oral Tab Take 1 tablet (0.5 mg total) by mouth nightly as needed for Sleep or Anxiety.   2024    busPIRone 10 MG Oral Tab Take 1 tablet (10 mg total) by mouth in the morning and 1 tablet (10 mg total) before bedtime.   2024    atorvastatin 40 MG Oral Tab Take 1 tablet (40 mg total) by mouth nightly. 30 tablet 2 2024    ARIPiprazole 5 MG Oral Tab Take 1 tablet (5 mg total) by mouth at bedtime.   2024    TRAZODONE 150 MG Oral Tab Take 1 tablet (150 mg total) by mouth nightly. 90 tablet 0 2024    pregabalin 50 MG Oral Cap Take 1 capsule (50 mg total) by mouth 3 (three) times daily. 90 capsule 1 2024    venlafaxine 75 MG Oral Capsule SR 24 Hr Take 3 capsules (225 mg total) by mouth daily. (Patient taking differently: Take 3 capsules (225 mg total) by mouth at bedtime.) 270 capsule 1 2024    [] Benazepril HCl 40 MG Oral Tab Take 1 tablet (40 mg total) by mouth daily. 30 tablet 0     Needles & Syringes Does not apply Misc Use to inject B12 90 each 0      Current  Facility-Administered Medications   Medication Dose Route Frequency    ALPRAZolam (Xanax) tab 0.5 mg  0.5 mg Oral Nightly PRN    ARIPiprazole (Abilify) tab 5 mg  5 mg Oral Nightly    atorvastatin (Lipitor) tab 40 mg  40 mg Oral Nightly    lisinopril (Prinivil; Zestril) tab 40 mg  40 mg Oral Daily    busPIRone (Buspar) tab 10 mg  10 mg Oral BID    metoprolol succinate ER (Toprol XL) 24 hr tab 25 mg  25 mg Oral Nightly    pregabalin (Lyrica) cap 50 mg  50 mg Oral TID    venlafaxine ER (Effexor-XR) 24 hr cap 225 mg  225 mg Oral Nightly    traZODone (Desyrel) tab 150 mg  150 mg Oral Nightly    acetaminophen (Tylenol Extra Strength) tab 500 mg  500 mg Oral Q4H PRN       REVIEW OF SYSTEMS:  Comprehensive Review of Systems obtained, and is negative other than that mentioned in the History of Present Illness.      PHYSICAL EXAMINATION:  VITAL SIGNS: /74 (BP Location: Left arm)   Pulse 65   Temp 98.5 °F (36.9 °C) (Oral)   Resp 18   Ht 70\"   Wt 224 lb (101.6 kg)   SpO2 95%   BMI 32.14 kg/m²   GENERAL:  Pleasant patient is a 63 year old female in no acute distress.  Sitting up comfortably in bed  HEENT:  Normocephalic, atraumatic  RESP: Easy and even   NEUROLOGICAL:  This patient is alert and orientated x 3.  Speech fluent. Comprehension intact.   Pupils equal round and reactive, symmetric.  EOMs intact.  Face appears symmetric.  Tongue midline, motility intact.  Uvula and palate elevate symmetrically.  Able to shrug shoulders.  Negative drift.  Rapid altering movements are intact.  Finger-nose is intact.    5/5 strength of bilateral upper and lower extremities, proximally and distally  Negative Mitesh's.  Negative clonus.  Bilateral upper and lower extremity sensation intact, proximally and distally  Nontender to palpation of the midline cervical spine.  Nontender with cervical axial loading.    DIAGNOSTIC DATA:   Lab Results   Component Value Date    WBC 6.4 01/23/2024    HGB 11.5 01/23/2024    HCT 33.6  01/23/2024    .0 01/23/2024    CREATSERUM 0.81 01/23/2024    BUN 19 01/23/2024     01/23/2024    K 3.8 01/23/2024     01/23/2024    CO2 25.0 01/23/2024    GLU 92 01/23/2024    CA 8.8 01/23/2024    ALB 3.9 01/22/2024    ALKPHO 35 01/22/2024    BILT 0.4 01/22/2024    TP 6.8 01/22/2024    AST 12 01/22/2024    ALT  01/22/2024      Comment:      Due to  backorder we are temporarily unable to offer hospital-based ALT testing at [place] [facility].   If urgently needed, please order ALT test code [id number].   The new order will need a new venipuncture and will be sent to [facility] for testing.   The expected turnaround time will be within 24 hours.     INR 1.07 01/22/2024    PTP 14.0 01/22/2024    MG 2.4 01/23/2024    PGLU 93 01/23/2024       IMAGING:    Study Result    Narrative   PROCEDURE:  CT BRAIN OR HEAD ([id number])     COMPARISON:  [facility] , CT, CT BRAIN OR HEAD ([id number]), 8/28/2018, 0:35 AM.     INDICATIONS:  trip and fall- lac to posterior scalp. no loc or vomiting. no thinners. bleeding controlled.  Head trauma.     TECHNIQUE:  Noncontrast CT scanning is performed through the brain. Dose reduction techniques were used. Dose information is transmitted to the ACR (American College of Radiology) NRDR (National Radiology Data Registry) which includes the Dose Index  Registry.     PATIENT STATED HISTORY: (As transcribed by Technologist)  Pt states about 12pm she was leaning over putting her pants on when she lost her balance and fell straight back hitting her head on a tile floor. Pt denies LOC, N/V. Aprrox 1 inch abrasion noted  to back of scalp. Bleeding controlled at this time.  Pt denies blood thinner use.            FINDINGS:    Subtle parafalcine subdural hematoma is noted measuring up to 2 millimeters in thickness.  No significant mass effect is noted.  There is no midline shift.  No abnormal extra-axial collection is identified.     No large vascular distribution infarction is noted.  No  intraparenchymal hemorrhage is identified.  No hydrocephalus.  Basal cisterns are patent.     No acute fracture is identified.  Mastoid air cells and paranasal sinuses are clear.                   Impression   CONCLUSION:    1. Subdural hematoma along the falx     This report was communicated by telephone to Dr. Aguilar at the dictation time shown below.       LOCATION:  [facility]        Dictated by (CST): Duke Mandujano MD on 1/22/2024 at 3:44 PM      Finalized by (CST): Duke Mandujano MD on 1/22/2024 at 3:48 PM       Study Result    Narrative   PROCEDURE:  CT SPINE CERVICAL (CPT=72125)     COMPARISON:  None.     INDICATIONS:  neck pain after fall with posterior head injury     TECHNIQUE:  Noncontrast CT scanning of the cervical spine is performed from the skull base through C7.  Multiplanar reconstructions are generated.  Dose reduction techniques were used. Dose information is transmitted to the ACR (American College of  Radiology) NRDR (National Radiology Data Registry) which includes the Dose Index Registry.     PATIENT STATED HISTORY: (As transcribed by Technologist)  Pt states about 12pm she was leaning over putting her pants on when she lost her balance and fell straight back hitting her head on a tile floor. Pt denies LOC, N/V. Aprrox 1 inch abrasion noted  to back of scalp. Bleeding controlled at this time.  Pt denies blood thinner use.            FINDINGS:    CRANIOCERVICAL AREA:  Normal foramen magnum with no Chiari malformation.  PARASPINAL AREA:  Normal with no visible mass.    BONES:  No fracture, pars defect, or osseous lesion.     CERVICAL DISC LEVELS:  C2-C3:  No significant disc/facet abnormality, spinal stenosis, or foraminal stenosis.  C3-C4:  Left greater than right facet joint hypertrophy results in severe left neural foraminal stenosis.  The right neural foramina and spinal canal are patent  C4-C5:  No significant disc/facet abnormality, spinal stenosis, or foraminal stenosis.  C5-C6:  Left greater  than right facet joint hypertrophy results in mild-to-moderate bilateral neural foraminal stenosis.  The spinal canal is patent  C6-C7:  Facet and uncovertebral joint hypertrophy results in mild-to-moderate bilateral neural foraminal stenosis.  Spinal canal is patent  C7-T1:  No significant disc/facet abnormality, spinal stenosis, or foraminal stenosis.                   Impression   CONCLUSION:  Multilevel degenerative changes.  No acute fracture in the cervical spine.           LOCATION:  [id number]        Dictated by (CST): Duke Mandujano MD on 1/22/2024 at 3:49 PM      Finalized by (CST): Duke Mandujano MD on 1/22/2024 at 3:52 PM        Study Result    Narrative   PROCEDURE:  CT BRAIN OR HEAD ([id number])     COMPARISON:  [facility], CT, CT BRAIN OR HEAD ([id number]), 1/22/2024, 3:23 PM.     INDICATIONS:  SDH interval scan to monitor     TECHNIQUE:  Noncontrast CT scanning is performed through the brain. Dose reduction techniques were used. Dose information is transmitted to the ACR (American College of Radiology) NRDR (National Radiology Data Registry) which includes the Dose Index  Registry.     PATIENT STATED HISTORY: (As transcribed by Technologist)  Patient is here for interval scan to monitor SDH.         FINDINGS:    Ventricles and sulci are within normal limits.  Trace age indeterminate small vessel ischemic disease.     There is no midline shift or mass-effect.  The basal cisterns are patent.  The gray-white matter differentiation is intact.     There is a 1 mm thick subdural hemorrhage at the anterior falx, improved since 1/22/2024.  No new region of intracranial hemorrhage is identified.     There is no evident fracture.  The visualized paranasal sinuses and mastoid air cells are unremarkable.                   Impression   CONCLUSION:       1. There is a 1 mm thick subdural hemorrhage at the anterior falx, improved since 1/22/2024.  No new region of intracranial hemorrhage is identified.     2. Trace age indeterminate  small vessel ischemic disease. If there is clinical concern for acute ischemia/infarction, an MRI of the brain would be recommended for further evaluation.        LOCATION:  [facility]        Dictated by (CST): Stromberg, LeRoy, MD on 1/23/2024 at 9:00 AM      Finalized by (CST): Stromberg, LeRoy, MD on 1/23/2024 at 9:04 AM      ASSESSMENT:  Patient Active Problem List   Diagnosis    History of back surgery    Postlaminectomy syndrome, lumbar region    Chest pain    Essential hypertension    IFG (impaired fasting glucose)    Hypertriglyceridemia    Right total knee arthroplasty  Global 04/12/2020    Effusion of right knee    Osteonecrosis of right knee region (HCC)    Acute medial meniscal tear, right, subsequent encounter - possible    Posterior knee pain, right    Right calf pain    Insufficiency fracture of medial femoral condyle (HCC)    Derangement of posterior horn of medial meniscus of right knee    Dizziness    Other closed nondisplaced fracture of proximal end of right humerus with routine healing, subsequent encounter    Hypokalemia    Multiple falls    Acute pain of right shoulder    Right ear pain    Ceruminosis, right    Complex tear of medial meniscus of left knee as current injury, subsequent encounter    Orthopedic aftercare    Osteoarthritis of glenohumeral joint, right    Status post arthroscopic surgery of left knee    Numbness and tingling in right hand    Injury of right shoulder, initial encounter    Acute postoperative pain of right shoulder    Posterior tibial tendon dysfunction (PTTD) of left lower extremity    BMI 38.0-38.9,adult    Gastroesophageal reflux disease without esophagitis    Acute chest pain    Pedal edema    Peripheral polyneuropathy    Severe obesity (BMI 35.0-39.9) with comorbidity (HCC)    Fatty liver disease, nonalcoholic    Chronic insomnia    Anxiety    Panic disorder    Moderate episode of recurrent major depressive disorder (HCC)    Primary osteoarthritis of left knee     B12 deficiency    Unstable angina (HCC)    Hypomagnesemia    Diarrhea, unspecified type    Dehydration    Hypernatremia    Acute subdural hematoma (HCC)    Fall, initial encounter    Injury of head, initial encounter    Laceration of scalp, initial encounter     63-year-old female who reports no use of daily antiplatelet/anticoagulation therapy, who presents to the ED status post fall.  Head CT was subdural hematoma along the falx.  Repeat head CT with 1 mm thick subdural hemorrhage at the anterior falx, improved since 1/22/2024.    Plan:  1.  No acute neurosurgical intervention recommend this time  2.  Medical management per hospitalist  3.  No further CT head imaging recommended from a neurosurgical standpoint unless a change in neurologic status  4.  No outpatient follow-up needed with neurosurgery.  Recommend follow-up with PCP.  5.  Discussed with Dr. Sal    The patient was seen and examined.  The patient agreed with the plan, verbalized understanding and was very appreciative.  Discussed with nursing.      Milagros Agrawal M.S., PA-C  [facility]  [address], [address] [place] [id number]  [phone]  1/23/2024 11:36 AM    Dragon speech recognition software was used to prepare this note. If a word or phrase is confusing, it is likely due to a failure of recognition. Please contact me with any questions or clarifications.    Is this a shared or split note between Advanced Practice Provider and Physician? Yes

## 2024-01-26 NOTE — CDS QUERY
CLINICAL DOCUMENTATION CLARIFICATION     Dear Dr. Solis,   The Medical Record includes a diagnosis of moderate episode of recurrent major depressive disorder in the Patient Active Problem List.  Please clarify if this is a valid secondary diagnosis for this admission.     [  ] Moderate episode of recurrent major depressive disorder is a valid secondary diagnosis for this admission    [  ] Moderate episode of recurrent major depressive disorder was not treated or monitored during tis admission  ________________________________________________________________________________  Risk Factors/Clinical Indicators:  1/22 ED: 63 year old female with history of htn & diabetes presented s/p fall with a laceration to her posterior scalp. Denied loss of consciousness. Found to have an acute subdural hematoma.    Home maintenance meds include:          Abiliffy 5 mg po nightly          Buspar 10 mg po twice daily          Trazadone 150 mg po nightly    Treatment: Above meds were continued during this admission        For questions regarding this query, please contact Clinical :   Renée Gunderson RN  Cell# [phone]                          Thank you!

## 2024-01-29 RX ORDER — TRAMADOL HYDROCHLORIDE 50 MG/1
50 TABLET ORAL EVERY 6 HOURS PRN
COMMUNITY

## 2024-02-05 ENCOUNTER — LABORATORY ENCOUNTER (OUTPATIENT)
Dept: LAB | Age: 64
End: 2024-02-05
Attending: ORTHOPAEDIC SURGERY
Payer: MEDICARE

## 2024-02-05 DIAGNOSIS — Z01.818 PRE-OP TESTING: ICD-10-CM

## 2024-02-05 LAB
ANTIBODY SCREEN: NEGATIVE
RH BLOOD TYPE: NEGATIVE

## 2024-02-05 PROCEDURE — 86901 BLOOD TYPING SEROLOGIC RH(D): CPT

## 2024-02-05 PROCEDURE — 86850 RBC ANTIBODY SCREEN: CPT

## 2024-02-05 PROCEDURE — 86900 BLOOD TYPING SEROLOGIC ABO: CPT

## 2024-02-05 PROCEDURE — 87081 CULTURE SCREEN ONLY: CPT

## 2024-02-12 ENCOUNTER — APPOINTMENT (OUTPATIENT)
Dept: CT IMAGING | Age: 64
End: 2024-02-12
Attending: NURSE PRACTITIONER
Payer: MEDICARE

## 2024-02-12 ENCOUNTER — HOSPITAL ENCOUNTER (EMERGENCY)
Age: 64
Discharge: HOME | End: 2024-02-13
Attending: EMERGENCY MEDICINE
Payer: MEDICARE

## 2024-02-12 ENCOUNTER — APPOINTMENT (OUTPATIENT)
Dept: GENERAL RADIOLOGY | Age: 64
End: 2024-02-12
Attending: NURSE PRACTITIONER
Payer: MEDICARE

## 2024-02-12 DIAGNOSIS — F41.9 ANXIETY: ICD-10-CM

## 2024-02-12 DIAGNOSIS — N39.0 URINARY TRACT INFECTION WITHOUT HEMATURIA, SITE UNSPECIFIED: Primary | ICD-10-CM

## 2024-02-12 DIAGNOSIS — G47.00 INSOMNIA, UNSPECIFIED TYPE: ICD-10-CM

## 2024-02-12 LAB
ALBUMIN SERPL-MCNC: 3.4 G/DL (ref 3.4–5)
ALBUMIN/GLOB SERPL: 1.1 {RATIO} (ref 1–2)
ALP LIVER SERPL-CCNC: 45 U/L
ALT SERPL-CCNC: 19 U/L
ANION GAP SERPL CALC-SCNC: 4 MMOL/L (ref 0–18)
AST SERPL-CCNC: 10 U/L (ref 15–37)
BASOPHILS # BLD AUTO: 0.04 X10(3) UL (ref 0–0.2)
BASOPHILS NFR BLD AUTO: 0.6 %
BILIRUB SERPL-MCNC: 0.2 MG/DL (ref 0.1–2)
BILIRUB UR QL STRIP.AUTO: NEGATIVE
BUN BLD-MCNC: 20 MG/DL (ref 9–23)
CALCIUM BLD-MCNC: 9.1 MG/DL (ref 8.5–10.1)
CHLORIDE SERPL-SCNC: 110 MMOL/L (ref 98–112)
CLARITY UR REFRACT.AUTO: CLEAR
CO2 SERPL-SCNC: 28 MMOL/L (ref 21–32)
COLOR UR AUTO: YELLOW
CREAT BLD-MCNC: 0.84 MG/DL
EGFRCR SERPLBLD CKD-EPI 2021: 77 ML/MIN/1.73M2 (ref 60–?)
EOSINOPHIL # BLD AUTO: 0.13 X10(3) UL (ref 0–0.7)
EOSINOPHIL NFR BLD AUTO: 1.9 %
ERYTHROCYTE [DISTWIDTH] IN BLOOD BY AUTOMATED COUNT: 12.5 %
GLOBULIN PLAS-MCNC: 3.1 G/DL (ref 2.8–4.4)
GLUCOSE BLD-MCNC: 112 MG/DL (ref 70–99)
GLUCOSE UR STRIP.AUTO-MCNC: NEGATIVE MG/DL
HCT VFR BLD AUTO: 36.7 %
HGB BLD-MCNC: 12.1 G/DL
IMM GRANULOCYTES # BLD AUTO: 0.02 X10(3) UL (ref 0–1)
IMM GRANULOCYTES NFR BLD: 0.3 %
KETONES UR STRIP.AUTO-MCNC: NEGATIVE MG/DL
LYMPHOCYTES # BLD AUTO: 2.39 X10(3) UL (ref 1–4)
LYMPHOCYTES NFR BLD AUTO: 35.6 %
MCH RBC QN AUTO: 29.4 PG (ref 26–34)
MCHC RBC AUTO-ENTMCNC: 33 G/DL (ref 31–37)
MCV RBC AUTO: 89.3 FL
MONOCYTES # BLD AUTO: 0.51 X10(3) UL (ref 0.1–1)
MONOCYTES NFR BLD AUTO: 7.6 %
NEUTROPHILS # BLD AUTO: 3.63 X10 (3) UL (ref 1.5–7.7)
NEUTROPHILS # BLD AUTO: 3.63 X10(3) UL (ref 1.5–7.7)
NEUTROPHILS NFR BLD AUTO: 54 %
NITRITE UR QL STRIP.AUTO: NEGATIVE
OSMOLALITY SERPL CALC.SUM OF ELEC: 297 MOSM/KG (ref 275–295)
PH UR STRIP.AUTO: 5.5 [PH] (ref 5–8)
PLATELET # BLD AUTO: 317 10(3)UL (ref 150–450)
POTASSIUM SERPL-SCNC: 3.5 MMOL/L (ref 3.5–5.1)
PROT SERPL-MCNC: 6.5 G/DL (ref 6.4–8.2)
PROT UR STRIP.AUTO-MCNC: NEGATIVE MG/DL
RBC # BLD AUTO: 4.11 X10(6)UL
RBC UR QL AUTO: NEGATIVE
SODIUM SERPL-SCNC: 142 MMOL/L (ref 136–145)
SP GR UR STRIP.AUTO: 1.02 (ref 1–1.03)
TROPONIN I SERPL HS-MCNC: 6 NG/L
UROBILINOGEN UR STRIP.AUTO-MCNC: 0.2 MG/DL
WBC # BLD AUTO: 6.7 X10(3) UL (ref 4–11)

## 2024-02-12 PROCEDURE — 36415 COLL VENOUS BLD VENIPUNCTURE: CPT

## 2024-02-12 PROCEDURE — 81015 MICROSCOPIC EXAM OF URINE: CPT | Performed by: EMERGENCY MEDICINE

## 2024-02-12 PROCEDURE — 85025 COMPLETE CBC W/AUTO DIFF WBC: CPT | Performed by: NURSE PRACTITIONER

## 2024-02-12 PROCEDURE — 80053 COMPREHEN METABOLIC PANEL: CPT | Performed by: NURSE PRACTITIONER

## 2024-02-12 PROCEDURE — 70450 CT HEAD/BRAIN W/O DYE: CPT | Performed by: NURSE PRACTITIONER

## 2024-02-12 PROCEDURE — 93005 ELECTROCARDIOGRAM TRACING: CPT

## 2024-02-12 PROCEDURE — 87086 URINE CULTURE/COLONY COUNT: CPT | Performed by: EMERGENCY MEDICINE

## 2024-02-12 PROCEDURE — 93010 ELECTROCARDIOGRAM REPORT: CPT

## 2024-02-12 PROCEDURE — 99284 EMERGENCY DEPT VISIT MOD MDM: CPT

## 2024-02-12 PROCEDURE — 84484 ASSAY OF TROPONIN QUANT: CPT | Performed by: NURSE PRACTITIONER

## 2024-02-12 PROCEDURE — 71045 X-RAY EXAM CHEST 1 VIEW: CPT | Performed by: NURSE PRACTITIONER

## 2024-02-12 PROCEDURE — 81001 URINALYSIS AUTO W/SCOPE: CPT | Performed by: EMERGENCY MEDICINE

## 2024-02-12 PROCEDURE — 99285 EMERGENCY DEPT VISIT HI MDM: CPT

## 2024-02-13 VITALS
HEART RATE: 72 BPM | SYSTOLIC BLOOD PRESSURE: 115 MMHG | HEIGHT: 69 IN | BODY MASS INDEX: 32.44 KG/M2 | OXYGEN SATURATION: 94 % | DIASTOLIC BLOOD PRESSURE: 76 MMHG | TEMPERATURE: 99 F | RESPIRATION RATE: 16 BRPM | WEIGHT: 219 LBS

## 2024-02-13 LAB
ATRIAL RATE: 75 BPM
P AXIS: 48 DEGREES
P-R INTERVAL: 150 MS
Q-T INTERVAL: 414 MS
QRS DURATION: 78 MS
QTC CALCULATION (BEZET): 462 MS
R AXIS: -15 DEGREES
T AXIS: 71 DEGREES
VENTRICULAR RATE: 75 BPM

## 2024-02-13 RX ORDER — CEPHALEXIN 500 MG/1
500 CAPSULE ORAL 4 TIMES DAILY
Qty: 28 CAPSULE | Refills: 0 | Status: SHIPPED | OUTPATIENT
Start: 2024-02-13 | End: 2024-02-19

## 2024-02-13 RX ORDER — HYDROXYZINE 50 MG/1
50 TABLET, FILM COATED ORAL NIGHTLY PRN
Qty: 20 TABLET | Refills: 0 | Status: SHIPPED | OUTPATIENT
Start: 2024-02-13

## 2024-02-13 NOTE — ED QUICK NOTES
Patient unsteady while in triage, stumbled but was able to catch herself on the door. Patient c/o of dizziness.

## 2024-02-13 NOTE — ED INITIAL ASSESSMENT (HPI)
Patient to ER with c/o anxiety and fatigue for the past \"couple of days\". Patient took BP tonight and it was 186/110. Patient with mid abdominal pain since Wednesday night, denies any N/V/D. Patient with dizziness since Thursday. Patient was just discharged from [facility] with a \"brain bleed\" last Monday.

## 2024-02-13 NOTE — ED PROVIDER NOTES
Patient Seen in: [facility] In [place]      History     Chief Complaint   Patient presents with    Hypertension     Stated Complaint: hypertension, last bp 186/110. stomach pain    Subjective:   HPI    Patient is a 63-year-old female presenting to the ED with difficulty sleeping and anxiety.  The history is obtained from patient as well as her daughter at bedside.  The patient initially states that symptoms have been present for 3 nights however the daughter states that this has been going on for longer, at least a week.  The patient was recently hospitalized after a fall and sustained a small subdural hemorrhage.  On reevaluation, this had improved and she was ultimately discharged home.  No anticoagulation.  The patient does take Xanax, Norco as needed for left shoulder pain which she is scheduled to have surgery, as well as tramadol.  The patient does state that she takes these altogether in the evening to try and help with pain as well as anxiety and sleeping.  She still gets up intermittently.  When she gets up, she often feels somewhat lightheaded or dizzy.  She denies any vertiginous symptoms stating it is more of a lightheadedness if she gets up too quick.  No associated chest pain.  Sometimes she feels like she has \"wheezing\" at night.  No history of COPD or asthma.  No unilateral weakness or loss of sensation, no slurring of speech, no facial droop.  Patient states that while she is ambulating, she will occasionally stumble on her feet but this has been ongoing for at least a year.  She ambulates without any assistance at baseline.    Objective:   Past Medical History:   Diagnosis Date    Anxiety state     Back pain     Back problem     Depression     Diabetes (HCC)     High blood pressure     High cholesterol     Neuropathy     Osteoarthritis     Other and unspecified hyperlipidemia     Unspecified essential hypertension     Visual impairment     glasses              Past Surgical  History:   Procedure Laterality Date    ANESTH,SHOULDER REPLACEMENT      right    ARTHROTOMY,OPEN REPAIR MENISCUS Left 2020    BACK SURGERY      laminectomy L4-5-S1    CHOLECYSTECTOMY      EXCIS LUMBAR DISK,ONE LEVEL  2015    redo L5-S1 discectomy    HYSTERECTOMY  2004    partial    OTHER      AVM malformation with coil put in place right side of chest     SPINE SURGERY PROCEDURE UNLISTED      TOTAL KNEE REPLACEMENT Bilateral 2022    R Knee Replacement                Social History     Socioeconomic History    Marital status:    Tobacco Use    Smoking status: Former     Packs/day: 1.00     Years: 10.00     Additional pack years: 0.00     Total pack years: 10.00     Types: Cigarettes     Quit date: 3/25/2000     Years since quittin.9    Smokeless tobacco: Never   Vaping Use    Vaping Use: Never used   Substance and Sexual Activity    Alcohol use: No     Comment: rare    Drug use: Never    Sexual activity: Not Currently     Partners: Male     Social Determinants of Health     Food Insecurity: No Food Insecurity (2024)    Food Insecurity     Food Insecurity: Never true   Transportation Needs: No Transportation Needs (2024)    Transportation Needs     Lack of Transportation: No   Housing Stability: Low Risk  (2024)    Housing Stability     Housing Instability: No              Review of Systems    Positive for stated complaint: hypertension, last bp 186/110. stomach pain  Other systems are as noted in HPI.  Constitutional and vital signs reviewed.      All other systems reviewed and negative except as noted above.    Physical Exam     ED Triage Vitals [24]   /80   Pulse 83   Resp 18   Temp 98.7 °F (37.1 °C)   Temp src Temporal   SpO2 94 %   O2 Device None (Room air)       Current:/76   Pulse 72   Temp 98.7 °F (37.1 °C) (Temporal)   Resp 16   Ht 175.3 cm (5' 9\")   Wt 99.3 kg   SpO2 94%   BMI 32.34 kg/m²         Physical Exam  Vitals and nursing  note reviewed.   Constitutional:       General: She is not in acute distress.     Appearance: Normal appearance. She is well-developed. She is not ill-appearing or toxic-appearing.   HENT:      Head: Normocephalic and atraumatic.      Right Ear: External ear normal.      Left Ear: External ear normal.      Mouth/Throat:      Mouth: Mucous membranes are moist.      Pharynx: Oropharynx is clear.   Eyes:      Conjunctiva/sclera: Conjunctivae normal.   Cardiovascular:      Rate and Rhythm: Normal rate and regular rhythm.      Heart sounds: Normal heart sounds.   Pulmonary:      Effort: Pulmonary effort is normal.      Breath sounds: Normal breath sounds.   Abdominal:      General: Abdomen is flat. Bowel sounds are normal. There is no distension.      Tenderness: There is no abdominal tenderness.   Musculoskeletal:      Right lower leg: No edema.      Left lower leg: No edema.   Skin:     General: Skin is warm.      Capillary Refill: Capillary refill takes less than 2 seconds.      Findings: No rash.   Neurological:      General: No focal deficit present.      Mental Status: She is alert and oriented to person, place, and time.      Sensory: No sensory deficit.      Motor: No weakness.      Comments: Finger-to-nose intact bilaterally.   Psychiatric:      Comments: Anxious mood.               ED Course     Labs Reviewed   COMP METABOLIC PANEL (14) - Abnormal; Notable for the following components:       Result Value    Glucose 112 (*)     Calculated Osmolality 297 (*)     AST 10 (*)     Alkaline Phosphatase 45 (*)     All other components within normal limits   URINALYSIS WITH CULTURE REFLEX - Abnormal; Notable for the following components:    Leukocyte Esterase Urine Small (*)     All other components within normal limits   UA MICROSCOPIC ONLY, URINE - Abnormal; Notable for the following components:    WBC Urine 11-20 (*)     Bacteria Urine 1+ (*)     All other components within normal limits   TROPONIN I HIGH SENSITIVITY  - Normal   CBC WITH DIFFERENTIAL WITH PLATELET    Narrative:     The following orders were created for panel order CBC With Differential With Platelet.  Procedure                               Abnormality         Status                     ---------                               -----------         ------                     CBC W/ DIFFERENTIAL[[id number]]                              Final result                 Please view results for these tests on the individual orders.   URINE CULTURE, ROUTINE   CBC W/ DIFFERENTIAL     EKG    Rate, intervals and axes as noted on EKG Report.  Rate: 75  Rhythm: Sinus Rhythm  Reading: Normal                          MDM      History obtained from patient and family.     Differential diagnosis includes anxiety,    Previous records reviewed including recent hospitalization for subdural hemorrhage.      CT BRAIN OR HEAD ([id number])    Result Date: 1/22/2024  PROCEDURE:  CT BRAIN OR HEAD ([id number])  COMPARISON:  [facility] , CT, CT BRAIN OR HEAD ([id number]), 8/28/2018, 0:35 AM.  INDICATIONS:  trip and fall- lac to posterior scalp. no loc or vomiting. no thinners. bleeding controlled.  Head trauma.  TECHNIQUE:  Noncontrast CT scanning is performed through the brain. Dose reduction techniques were used. Dose information is transmitted to the ACR (American College of Radiology) NRDR (National Radiology Data Registry) which includes the Dose Index Registry.  PATIENT STATED HISTORY: (As transcribed by Technologist)  Pt states about 12pm she was leaning over putting her pants on when she lost her balance and fell straight back hitting her head on a tile floor. Pt denies LOC, N/V. Aprrox 1 inch abrasion noted to back of scalp. Bleeding controlled at this time. Pt denies blood thinner use.     FINDINGS:  Subtle parafalcine subdural hematoma is noted measuring up to 2 millimeters in thickness.  No significant mass effect is noted.  There is no midline shift.  No abnormal extra-axial collection is identified.   No large vascular distribution infarction is noted.  No intraparenchymal hemorrhage is identified.  No hydrocephalus.  Basal cisterns are patent.  No acute fracture is identified.  Mastoid air cells and paranasal sinuses are clear.            CONCLUSION:  1. Subdural hematoma along the falx  This report was communicated by telephone to Dr. Aguilar at the dictation time shown below.   LOCATION:  [facility]   Dictated by (CST): Duke Mandujano MD on 1/22/2024 at 3:44 PM     Finalized by (CST): Duke Mandujano MD on 1/22/2024 at 3:48 PM        CT BRAIN OR HEAD ([id number])    Result Date: 1/23/2024  PROCEDURE:  CT BRAIN OR HEAD ([id number])  COMPARISON:  [facility], CT, CT BRAIN OR HEAD ([id number]), 1/22/2024, 3:23 PM.  INDICATIONS:  SDH interval scan to monitor  TECHNIQUE:  Noncontrast CT scanning is performed through the brain. Dose reduction techniques were used. Dose information is transmitted to the ACR (American College of Radiology) NRDR (National Radiology Data Registry) which includes the Dose Index Registry.  PATIENT STATED HISTORY: (As transcribed by Technologist)  Patient is here for interval scan to monitor SDH.    FINDINGS:  Ventricles and sulci are within normal limits.  Trace age indeterminate small vessel ischemic disease.  There is no midline shift or mass-effect.  The basal cisterns are patent.  The gray-white matter differentiation is intact.  There is a 1 mm thick subdural hemorrhage at the anterior falx, improved since 1/22/2024.  No new region of intracranial hemorrhage is identified.  There is no evident fracture.  The visualized paranasal sinuses and mastoid air cells are unremarkable.            CONCLUSION:   1. There is a 1 mm thick subdural hemorrhage at the anterior falx, improved since 1/22/2024.  No new region of intracranial hemorrhage is identified.  2. Trace age indeterminate small vessel ischemic disease. If there is clinical concern for acute ischemia/infarction, an MRI of the brain would be recommended for  further evaluation.   LOCATION:  [facility]   Dictated by (CST): Stromberg, LeRoy, MD on 1/23/2024 at 9:00 AM     Finalized by (CST): Stromberg, LeRoy, MD on 1/23/2024 at 9:04 AM         Testing considered and ordered includes EKG, chest x-ray, CBC, CMP, troponin, repeat CT brain.    I reviewed all results.  CBC and CMP are unremarkable.  Troponin is negative.  Urinalysis suspicious for UTI with presence of WBCs as well as bacteria and small leukocyte esterase.  Orthostatic vital signs are negative.      I personally reviewed the radiographs and my individual interpretation shows no acute findings  I also reviewed the official report which shows   XR CHEST AP PORTABLE  (CPT=71045)    Result Date: 2/12/2024  PROCEDURE:  XR CHEST AP PORTABLE  (CPT=71045)  TECHNIQUE:  AP chest radiograph was obtained.  COMPARISON:  [facility] , XR, XR CHEST AP PORTABLE  (CPT=71045), 12/03/2023, 5:37 PM.  [facility] , XR, XR CHEST AP PORTABLE  (CPT=71045), 6/07/2023, 2:32 PM.  INDICATIONS:  hypertension, last bp 186/110. stomach pain  PATIENT STATED HISTORY: (As transcribed by Technologist)  Pt c/o mid anterior stomach pain since 2/7/24, she denies any other chest complications. Pt has hx of coil in right lung and hx of gallbladder removal.    FINDINGS:  Cardiac size and pulmonary vasculature are within normal limits. No pleural effusions. No pneumothorax.  Calcified granuloma.  Right shoulder arthroplasty.             CONCLUSION:  No acute pulmonary findings.   LOCATION:  [facility]      Dictated by (RUST): Jeni Celeste MD on 2/12/2024 at 10:54 PM     Finalized by (CST): Jeni Celeste MD on 2/12/2024 at 10:54 PM       CT BRAIN OR HEAD ([id number])    Result Date: 2/12/2024  PROCEDURE:  CT BRAIN OR HEAD ([id number])  COMPARISON:  [facility] , CT, CT BRAIN OR HEAD ([id number]), 1/23/2024, 8:38 AM.  [facility], CT, CT BRAIN OR HEAD ([id number]), 1/22/2024, 3:23 PM.  INDICATIONS:  hypertension, last bp 186/110. stomach pain  TECHNIQUE:  Noncontrast CT scanning is performed  through the brain. Dose reduction techniques were used. Dose information is transmitted to the ACR (American College of Radiology) NRDR (National Radiology Data Registry) which includes the Dose Index Registry.  PATIENT STATED HISTORY: (As transcribed by Technologist)  hypertension and unable to sleep the past 3 days. patient stated she fell a few weeks ago and is feeling dizzy on occasion.    FINDINGS:  The ventricles are symmetric bilaterally. There are patchy areas of low attenuation in the periventricular and deep white matter which are nonspecific but most consistent with small vessel ischemic changes. There is no evidence of hemorrhage, mass, midline shift, or extra-axial fluid collection.  The visualized portions of the orbits are normal.  The visualized paranasal sinuses show no significant sinus disease.. No evidence of depressed skull fracture.            CONCLUSION: 1. No acute intracranial findings 2. Chronic microvascular ischemic changes    LOCATION:  [facility]   Dictated by (CST): Jeni Celeste MD on 2/12/2024 at 10:39 PM     Finalized by (CST): Jeni Celeste MD on 2/12/2024 at 10:40 PM         Interventions in care included none required in ED.  Patient's chief complaint and concern was mostly her insomnia and inability to sleep due to her stress and anxiety.  She takes Norco, tramadol, trazodone, as well as Xanax in the evening and still has difficulty sleeping due to stress or anxiety.  At this time, would only add hydroxyzine to be used as needed at night otherwise discuss additional options with her primary care provider.  May return to ED if any symptoms worsen, persist, or new symptoms develop.  Patient is otherwise ambulatory in the ED without any difficulty, asymptomatic, no dizziness.  Her dizziness getting up in the middle of the night may also be an adverse medication reaction given multiple medications that she is taking in the evening before bed.  Discussed all results as well as  discharge plan.  Comfortable going home at this time.  Will prescribe antibiotics for suspected UTI as well.                                         Medical Decision Making      Disposition and Plan     Clinical Impression:  1. Urinary tract infection without hematuria, site unspecified    2. Insomnia, unspecified type    3. Anxiety         Disposition:  Discharge  2/13/2024 12:42 am    Follow-up:  Coco Du DO  [address] [facility] [facility]  [address]  [facility] [address]  [phone]    Schedule an appointment as soon as possible for a visit in 2 day(s)      [facility] in [address] [address]  [phone]  Follow up  IF SYMPTOMS WORSEN, PERSIST, OR NEW SYMPTOMS DEVEL          Medications Prescribed:  Discharge Medication List as of 2/13/2024 12:44 AM        START taking these medications    Details   hydrOXYzine 50 MG Oral Tab Take 1 tablet (50 mg total) by mouth nightly as needed (insomnia)., Normal, Disp-20 tablet, R-0      cephalexin 500 MG Oral Cap Take 1 capsule (500 mg total) by mouth 4 (four) times daily for 7 days., Normal, Disp-28 capsule, R-0

## 2024-02-15 ENCOUNTER — APPOINTMENT (OUTPATIENT)
Dept: CT IMAGING | Facility: HOSPITAL | Age: 64
End: 2024-02-15
Attending: EMERGENCY MEDICINE
Payer: MEDICARE

## 2024-02-15 ENCOUNTER — HOSPITAL ENCOUNTER (EMERGENCY)
Facility: HOSPITAL | Age: 64
Discharge: HOME | End: 2024-02-15
Attending: EMERGENCY MEDICINE
Payer: MEDICARE

## 2024-02-15 VITALS
OXYGEN SATURATION: 96 % | DIASTOLIC BLOOD PRESSURE: 75 MMHG | BODY MASS INDEX: 32.44 KG/M2 | SYSTOLIC BLOOD PRESSURE: 163 MMHG | HEIGHT: 69 IN | WEIGHT: 219 LBS | TEMPERATURE: 97 F | RESPIRATION RATE: 20 BRPM | HEART RATE: 70 BPM

## 2024-02-15 DIAGNOSIS — R53.1 WEAKNESS GENERALIZED: Primary | ICD-10-CM

## 2024-02-15 DIAGNOSIS — R41.0 INTERMITTENT CONFUSION: ICD-10-CM

## 2024-02-15 LAB
ALBUMIN SERPL-MCNC: 3.7 G/DL (ref 3.4–5)
ALBUMIN/GLOB SERPL: 1.2 {RATIO} (ref 1–2)
ALP LIVER SERPL-CCNC: 45 U/L
ALT SERPL-CCNC: 18 U/L
ANION GAP SERPL CALC-SCNC: 4 MMOL/L (ref 0–18)
AST SERPL-CCNC: 22 U/L (ref 15–37)
BASOPHILS # BLD AUTO: 0.03 X10(3) UL (ref 0–0.2)
BASOPHILS NFR BLD AUTO: 0.4 %
BILIRUB SERPL-MCNC: 0.2 MG/DL (ref 0.1–2)
BILIRUB UR QL STRIP.AUTO: NEGATIVE
BUN BLD-MCNC: 20 MG/DL (ref 9–23)
CALCIUM BLD-MCNC: 9.2 MG/DL (ref 8.5–10.1)
CHLORIDE SERPL-SCNC: 110 MMOL/L (ref 98–112)
CLARITY UR REFRACT.AUTO: CLEAR
CO2 SERPL-SCNC: 27 MMOL/L (ref 21–32)
CREAT BLD-MCNC: 0.94 MG/DL
EGFRCR SERPLBLD CKD-EPI 2021: 67 ML/MIN/1.73M2 (ref 60–?)
EOSINOPHIL # BLD AUTO: 0.13 X10(3) UL (ref 0–0.7)
EOSINOPHIL NFR BLD AUTO: 1.8 %
ERYTHROCYTE [DISTWIDTH] IN BLOOD BY AUTOMATED COUNT: 12.5 %
FLUAV + FLUBV RNA SPEC NAA+PROBE: NEGATIVE
FLUAV + FLUBV RNA SPEC NAA+PROBE: NEGATIVE
GLOBULIN PLAS-MCNC: 3 G/DL (ref 2.8–4.4)
GLUCOSE BLD-MCNC: 88 MG/DL (ref 70–99)
GLUCOSE UR STRIP.AUTO-MCNC: NORMAL MG/DL
HCT VFR BLD AUTO: 37.2 %
HGB BLD-MCNC: 12.5 G/DL
IMM GRANULOCYTES # BLD AUTO: 0.02 X10(3) UL (ref 0–1)
IMM GRANULOCYTES NFR BLD: 0.3 %
KETONES UR STRIP.AUTO-MCNC: NEGATIVE MG/DL
LEUKOCYTE ESTERASE UR QL STRIP.AUTO: 75
LYMPHOCYTES # BLD AUTO: 1.95 X10(3) UL (ref 1–4)
LYMPHOCYTES NFR BLD AUTO: 27.1 %
MCH RBC QN AUTO: 29.7 PG (ref 26–34)
MCHC RBC AUTO-ENTMCNC: 33.6 G/DL (ref 31–37)
MCV RBC AUTO: 88.4 FL
MONOCYTES # BLD AUTO: 0.52 X10(3) UL (ref 0.1–1)
MONOCYTES NFR BLD AUTO: 7.2 %
NEUTROPHILS # BLD AUTO: 4.54 X10 (3) UL (ref 1.5–7.7)
NEUTROPHILS # BLD AUTO: 4.54 X10(3) UL (ref 1.5–7.7)
NEUTROPHILS NFR BLD AUTO: 63.2 %
NITRITE UR QL STRIP.AUTO: NEGATIVE
OSMOLALITY SERPL CALC.SUM OF ELEC: 294 MOSM/KG (ref 275–295)
PH UR STRIP.AUTO: 6 [PH] (ref 5–8)
PLATELET # BLD AUTO: 321 10(3)UL (ref 150–450)
POTASSIUM SERPL-SCNC: 4 MMOL/L (ref 3.5–5.1)
PROT SERPL-MCNC: 6.7 G/DL (ref 6.4–8.2)
PROT UR STRIP.AUTO-MCNC: NEGATIVE MG/DL
RBC # BLD AUTO: 4.21 X10(6)UL
RBC UR QL AUTO: NEGATIVE
RSV RNA SPEC NAA+PROBE: NEGATIVE
SARS-COV-2 RNA RESP QL NAA+PROBE: NOT DETECTED
SODIUM SERPL-SCNC: 141 MMOL/L (ref 136–145)
SP GR UR STRIP.AUTO: 1.02 (ref 1–1.03)
TSI SER-ACNC: 2 MIU/ML (ref 0.36–3.74)
UROBILINOGEN UR STRIP.AUTO-MCNC: NORMAL MG/DL
WBC # BLD AUTO: 7.2 X10(3) UL (ref 4–11)

## 2024-02-15 PROCEDURE — 96360 HYDRATION IV INFUSION INIT: CPT

## 2024-02-15 PROCEDURE — 80053 COMPREHEN METABOLIC PANEL: CPT | Performed by: EMERGENCY MEDICINE

## 2024-02-15 PROCEDURE — 85025 COMPLETE CBC W/AUTO DIFF WBC: CPT | Performed by: EMERGENCY MEDICINE

## 2024-02-15 PROCEDURE — 99284 EMERGENCY DEPT VISIT MOD MDM: CPT

## 2024-02-15 PROCEDURE — 87086 URINE CULTURE/COLONY COUNT: CPT | Performed by: EMERGENCY MEDICINE

## 2024-02-15 PROCEDURE — 81001 URINALYSIS AUTO W/SCOPE: CPT | Performed by: EMERGENCY MEDICINE

## 2024-02-15 PROCEDURE — 0241U SARS-COV-2/FLU A AND B/RSV BY PCR (GENEXPERT): CPT | Performed by: EMERGENCY MEDICINE

## 2024-02-15 PROCEDURE — 70450 CT HEAD/BRAIN W/O DYE: CPT | Performed by: EMERGENCY MEDICINE

## 2024-02-15 PROCEDURE — 96361 HYDRATE IV INFUSION ADD-ON: CPT

## 2024-02-15 PROCEDURE — 99285 EMERGENCY DEPT VISIT HI MDM: CPT

## 2024-02-15 PROCEDURE — 84443 ASSAY THYROID STIM HORMONE: CPT | Performed by: EMERGENCY MEDICINE

## 2024-02-15 NOTE — ED INITIAL ASSESSMENT (HPI)
Patient recently seen in ER on Monday for anxiety- dx with UTI- given PO abx and sent home. Daughters state patient appears to be more confused and talking unclear, slow. Patient states she feels very sleepy. Denies headaches. These symptoms have been ongoing for about a week.

## 2024-02-15 NOTE — CM/SW NOTE
CM assistance requested by Dr. Jordan to assist the family with arranging a Neuro follow up appointment for the patient.     CM met with patient and daughters at bedside. CM will call Neuro tomorrow to assist with follow up appointment.

## 2024-02-15 NOTE — ED PROVIDER NOTES
Patient Seen in: [facility] Emergency Department      History     Chief Complaint   Patient presents with    Altered Mental Status     Stated Complaint: confusion & slurred speech x2 days    Subjective:   HPI    63-year-old female with a past medical history as below including hypertension, diabetes, hyperlipidemia, anxiety and depression brought by daughters due to worsening generalized weakness for the past week.  Patient states that she just feels generally weak but denies any focal weakness or numbness of extremities.  Daughter states that she has been sleeping a lot and there has been intermittent confusion.  Daughter states she was admitted last month for bleeding around the brain after fall.  She denies any new falls or injury.  She was seen at the [facility] 3 days ago and diagnosed with UTI and prescribed an antibiotic.  She does report some dysuria but thinks that is improving.  She denies fever, chills, cough or cold symptoms.  She denies chest pain, shortness of breath or palpitations.    Objective:   Past Medical History:   Diagnosis Date    Anxiety state     Back pain     Back problem     Depression     Diabetes (HCC)     High blood pressure     High cholesterol     Neuropathy     Osteoarthritis     Other and unspecified hyperlipidemia     Unspecified essential hypertension     Visual impairment     glasses              Past Surgical History:   Procedure Laterality Date    ANESTH,SHOULDER REPLACEMENT      right    ARTHROTOMY,OPEN REPAIR MENISCUS Left 01/13/2020    BACK SURGERY  2000/2001    laminectomy L4-5-S1    CHOLECYSTECTOMY      EXCIS LUMBAR DISK,ONE LEVEL  2015    redo L5-S1 discectomy    HYSTERECTOMY  2004    partial    OTHER      AVM malformation with coil put in place right side of chest     SPINE SURGERY PROCEDURE UNLISTED      TOTAL KNEE REPLACEMENT Bilateral 05/2022    R Knee Replacement                Social History     Socioeconomic History    Marital status:    Tobacco  Use    Smoking status: Former     Packs/day: 1.00     Years: 10.00     Additional pack years: 0.00     Total pack years: 10.00     Types: Cigarettes     Quit date: 3/25/2000     Years since quittin.9     Passive exposure: Never    Smokeless tobacco: Never   Vaping Use    Vaping Use: Never used   Substance and Sexual Activity    Alcohol use: No     Comment: rare    Drug use: Never    Sexual activity: Not Currently     Partners: Male     Social Determinants of Health     Food Insecurity: No Food Insecurity (2024)    Food Insecurity     Food Insecurity: Never true   Transportation Needs: No Transportation Needs (2024)    Transportation Needs     Lack of Transportation: No   Housing Stability: Low Risk  (2024)    Housing Stability     Housing Instability: No              Review of Systems    Positive for stated complaint: confusion & slurred speech x2 days  Other systems are as noted in HPI.  Constitutional and vital signs reviewed.      All other systems reviewed and negative except as noted above.    Physical Exam     ED Triage Vitals [02/15/24 1251]   /83   Pulse 70   Resp 18   Temp 97.3 °F (36.3 °C)   Temp src Temporal   SpO2 94 %   O2 Device None (Room air)       Current:BP (!) 163/75   Pulse 70   Temp 97.3 °F (36.3 °C) (Temporal)   Resp 20   Ht 175.3 cm (5' 9\")   Wt 99.3 kg   SpO2 96%   BMI 32.34 kg/m²         Physical Exam  Vitals and nursing note reviewed.   Constitutional:       Appearance: She is well-developed.   HENT:      Head: Normocephalic and atraumatic.      Mouth/Throat:      Mouth: Mucous membranes are moist.   Eyes:      General: No scleral icterus.  Cardiovascular:      Rate and Rhythm: Normal rate and regular rhythm.   Pulmonary:      Effort: Pulmonary effort is normal.      Breath sounds: Normal breath sounds.   Abdominal:      Palpations: Abdomen is soft.      Tenderness: There is no abdominal tenderness.   Skin:     General: Skin is warm and dry.   Neurological:       General: No focal deficit present.      Mental Status: She is alert and oriented to person, place, and time.      Cranial Nerves: No cranial nerve deficit.      Motor: No weakness.   Psychiatric:         Mood and Affect: Mood normal.         Behavior: Behavior normal.               ED Course     Labs Reviewed   URINALYSIS WITH CULTURE REFLEX - Abnormal; Notable for the following components:       Result Value    Leukocyte Esterase Urine 75 (*)     WBC Urine 6-10 (*)     Bacteria Urine Rare (*)     Squamous Epi. Cells Few (*)     All other components within normal limits   COMP METABOLIC PANEL (14) - Abnormal; Notable for the following components:    Alkaline Phosphatase 45 (*)     All other components within normal limits   TSH W REFLEX TO FREE T4 - Normal   SARS-COV-2/FLU A AND B/RSV BY PCR (GENEXPERT) - Normal    Narrative:     This test is intended for the qualitative detection and differentiation of SARS-CoV-2, influenza A, influenza B, and respiratory syncytial virus (RSV) viral RNA in nasopharyngeal or nares swabs from individuals suspected of respiratory viral infection consistent with COVID-19 by their healthcare provider. Signs and symptoms of respiratory viral infection due to SARS-CoV-2, influenza, and RSV can be similar.    Test performed using the Xpert Xpress SARS-CoV-2/FLU/RSV (real time RT-PCR)  assay on the Gene[employer]pert instrument, [employer], [place] [address].   This test is being used under the Food and Drug Administration's Emergency Use Authorization.    The authorized Fact Sheet for Healthcare Providers for this assay is available upon request from the laboratory.   CBC WITH DIFFERENTIAL WITH PLATELET    Narrative:     The following orders were created for panel order CBC With Differential With Platelet.  Procedure                               Abnormality         Status                     ---------                               -----------         ------                     CBC W/  DIFFERENTIAL[[id number]]                              Final result                 Please view results for these tests on the individual orders.   RAINBOW DRAW LAVENDER   RAINBOW DRAW LIGHT GREEN   RAINBOW DRAW BLUE   URINE CULTURE, ROUTINE   CBC W/ DIFFERENTIAL             CT BRAIN OR HEAD (86754)    Result Date: 2/15/2024  CONCLUSION:  1. No acute intracranial hemorrhage or hydrocephalus. 2. Trace chronic small vessel ischemic disease. If there is clinical concern for acute ischemia/infarction, an MRI of the brain would be recommended for further evaluation.    LOCATION:  [facility]   Dictated by (CST): Stromberg, LeRoy, MD on 2/15/2024 at 3:26 PM     Finalized by (CST): Stromberg, LeRoy, MD on 2/15/2024 at 3:28 PM               MDM   63-year-old female with a past medical history as below including hypertension, diabetes, hyperlipidemia, anxiety and depression brought by daughters due to worsening generalized weakness for the past week.      Chart reviewed from ED visit on 2/11/2024 when patient was seen for insomnia and anxiety.  UA showed some evidence of UTI.  Labs are otherwise unremarkable with normal electrolytes and renal function, normal WBC and hemoglobin.  Urine culture showed no growth.  CT brain showed no acute findings.    Differential includes but is not limited to encephalopathy, dehydration, electrolyte abnormality, anemia, UTI, unlikely ICH    Labs show normal WBC, hemoglobin, electrolytes and renal function.  UA shows mild evidence UTI however improved from previous on 2/12/2024.  Unlikely symptoms due to UTI as previous urine culture showed no growth.    Independent interpretation of CT brain shows no evidence of bleed.  Radiology report reviewed as above noting no acute findings.    Patient has no focal deficits that would be concerning for CVA.    Discussed findings with patient and daughters who are concerned that patient may be developing dementia.  Daughter states that patient has had  similar symptoms of confusion on and off for some time although it has been worse over the past week.  Workup without any acute abnormalities that would indicate admission.  Shared decision making used and patient and family are comfortable with discharge home and follow-up with neurology for further outpatient workup.   contacted and spoke with family regarding additional resources at home and will help ensure timely follow-up.  Strict return precautions discussed.      Medical Decision Making  Amount and/or Complexity of Data Reviewed  Independent Historian: caregiver     Details: Daughters, see HPI  External Data Reviewed: labs, radiology and notes.  Labs: ordered. Decision-making details documented in ED Course.  Radiology: ordered and independent interpretation performed. Decision-making details documented in ED Course.  Discussion of management or test interpretation with external provider(s):     Risk  Decision regarding hospitalization.        Disposition and Plan     Clinical Impression:  1. Weakness generalized    2. Intermittent confusion         Disposition:  Discharge  2/15/2024  5:05 pm    Follow-up:  Coco Du DO  [address]  [address]  [facility] [address] [phone]    Schedule an appointment as soon as possible for a visit in 2 day(s)      Eun Rico NP  [address] [place] [address] [phone]    Schedule an appointment as soon as possible for a visit      Nannette Philippe MD  [address] [place] [address] [phone]    Schedule an appointment as soon as possible for a visit            Medications Prescribed:  Current Discharge Medication List

## 2024-02-16 ENCOUNTER — APPOINTMENT (OUTPATIENT)
Dept: GENERAL RADIOLOGY | Facility: HOSPITAL | Age: 64
End: 2024-02-16
Attending: EMERGENCY MEDICINE
Payer: MEDICARE

## 2024-02-16 ENCOUNTER — HOSPITAL ENCOUNTER (INPATIENT)
Facility: HOSPITAL | Age: 64
LOS: 3 days | Discharge: HOME HEALTH | End: 2024-02-19
Attending: EMERGENCY MEDICINE | Admitting: INTERNAL MEDICINE
Payer: MEDICARE

## 2024-02-16 ENCOUNTER — APPOINTMENT (OUTPATIENT)
Dept: CT IMAGING | Facility: HOSPITAL | Age: 64
End: 2024-02-16
Attending: EMERGENCY MEDICINE
Payer: MEDICARE

## 2024-02-16 ENCOUNTER — TELEPHONE (OUTPATIENT)
Dept: CASE MANAGEMENT | Facility: HOSPITAL | Age: 64
End: 2024-02-16

## 2024-02-16 DIAGNOSIS — R41.82 ALTERED MENTAL STATUS, UNSPECIFIED ALTERED MENTAL STATUS TYPE: Primary | ICD-10-CM

## 2024-02-16 LAB
ALBUMIN SERPL-MCNC: 3.5 G/DL (ref 3.4–5)
ALBUMIN/GLOB SERPL: 1.2 {RATIO} (ref 1–2)
ALP LIVER SERPL-CCNC: 41 U/L
ALT SERPL-CCNC: 17 U/L
ANION GAP SERPL CALC-SCNC: 3 MMOL/L (ref 0–18)
AST SERPL-CCNC: 17 U/L (ref 15–37)
ATRIAL RATE: 60 BPM
BASOPHILS # BLD AUTO: 0.04 X10(3) UL (ref 0–0.2)
BASOPHILS NFR BLD AUTO: 0.5 %
BILIRUB SERPL-MCNC: 0.3 MG/DL (ref 0.1–2)
BILIRUB UR QL STRIP.AUTO: NEGATIVE
BUN BLD-MCNC: 18 MG/DL (ref 9–23)
CALCIUM BLD-MCNC: 9.4 MG/DL (ref 8.5–10.1)
CHLORIDE SERPL-SCNC: 112 MMOL/L (ref 98–112)
CLARITY UR REFRACT.AUTO: CLEAR
CO2 SERPL-SCNC: 26 MMOL/L (ref 21–32)
COLOR UR AUTO: COLORLESS
CREAT BLD-MCNC: 0.9 MG/DL
EGFRCR SERPLBLD CKD-EPI 2021: 71 ML/MIN/1.73M2 (ref 60–?)
EOSINOPHIL # BLD AUTO: 0.1 X10(3) UL (ref 0–0.7)
EOSINOPHIL NFR BLD AUTO: 1.4 %
ERYTHROCYTE [DISTWIDTH] IN BLOOD BY AUTOMATED COUNT: 12.5 %
ETHANOL SERPL-MCNC: <3 MG/DL (ref ?–3)
GLOBULIN PLAS-MCNC: 2.9 G/DL (ref 2.8–4.4)
GLUCOSE BLD-MCNC: 115 MG/DL (ref 70–99)
GLUCOSE UR STRIP.AUTO-MCNC: NORMAL MG/DL
HCT VFR BLD AUTO: 36.6 %
HGB BLD-MCNC: 12.1 G/DL
IMM GRANULOCYTES # BLD AUTO: 0.03 X10(3) UL (ref 0–1)
IMM GRANULOCYTES NFR BLD: 0.4 %
KETONES UR STRIP.AUTO-MCNC: NEGATIVE MG/DL
LEUKOCYTE ESTERASE UR QL STRIP.AUTO: NEGATIVE
LYMPHOCYTES # BLD AUTO: 2.35 X10(3) UL (ref 1–4)
LYMPHOCYTES NFR BLD AUTO: 31.9 %
MCH RBC QN AUTO: 29.6 PG (ref 26–34)
MCHC RBC AUTO-ENTMCNC: 33.1 G/DL (ref 31–37)
MCV RBC AUTO: 89.5 FL
MONOCYTES # BLD AUTO: 0.5 X10(3) UL (ref 0.1–1)
MONOCYTES NFR BLD AUTO: 6.8 %
NEUTROPHILS # BLD AUTO: 4.34 X10 (3) UL (ref 1.5–7.7)
NEUTROPHILS # BLD AUTO: 4.34 X10(3) UL (ref 1.5–7.7)
NEUTROPHILS NFR BLD AUTO: 59 %
NITRITE UR QL STRIP.AUTO: NEGATIVE
OSMOLALITY SERPL CALC.SUM OF ELEC: 295 MOSM/KG (ref 275–295)
P AXIS: 44 DEGREES
P-R INTERVAL: 162 MS
PH UR STRIP.AUTO: 5.5 [PH] (ref 5–8)
PLATELET # BLD AUTO: 304 10(3)UL (ref 150–450)
POTASSIUM SERPL-SCNC: 3.7 MMOL/L (ref 3.5–5.1)
PROCALCITONIN SERPL-MCNC: <0.05 NG/ML (ref ?–0.16)
PROT SERPL-MCNC: 6.4 G/DL (ref 6.4–8.2)
PROT UR STRIP.AUTO-MCNC: NEGATIVE MG/DL
Q-T INTERVAL: 440 MS
QRS DURATION: 80 MS
QTC CALCULATION (BEZET): 440 MS
R AXIS: -8 DEGREES
RBC # BLD AUTO: 4.09 X10(6)UL
RBC UR QL AUTO: NEGATIVE
SODIUM SERPL-SCNC: 141 MMOL/L (ref 136–145)
SP GR UR STRIP.AUTO: 1.03 (ref 1–1.03)
T AXIS: 51 DEGREES
TROPONIN I SERPL HS-MCNC: 6 NG/L
UROBILINOGEN UR STRIP.AUTO-MCNC: NORMAL MG/DL
VENTRICULAR RATE: 60 BPM
WBC # BLD AUTO: 7.4 X10(3) UL (ref 4–11)

## 2024-02-16 PROCEDURE — 70498 CT ANGIOGRAPHY NECK: CPT | Performed by: EMERGENCY MEDICINE

## 2024-02-16 PROCEDURE — 71045 X-RAY EXAM CHEST 1 VIEW: CPT | Performed by: EMERGENCY MEDICINE

## 2024-02-16 PROCEDURE — 70496 CT ANGIOGRAPHY HEAD: CPT | Performed by: EMERGENCY MEDICINE

## 2024-02-16 PROCEDURE — 73030 X-RAY EXAM OF SHOULDER: CPT | Performed by: EMERGENCY MEDICINE

## 2024-02-16 RX ORDER — LISINOPRIL 20 MG/1
40 TABLET ORAL DAILY
Status: DISCONTINUED | OUTPATIENT
Start: 2024-02-17 | End: 2024-02-19

## 2024-02-16 RX ORDER — METOPROLOL SUCCINATE 25 MG/1
25 TABLET, EXTENDED RELEASE ORAL NIGHTLY
Status: DISCONTINUED | OUTPATIENT
Start: 2024-02-16 | End: 2024-02-19

## 2024-02-16 RX ORDER — ALPRAZOLAM 0.5 MG/1
0.5 TABLET ORAL NIGHTLY PRN
Status: DISCONTINUED | OUTPATIENT
Start: 2024-02-16 | End: 2024-02-18

## 2024-02-16 RX ORDER — ASPIRIN 81 MG/1
324 TABLET, CHEWABLE ORAL ONCE
Status: DISCONTINUED | OUTPATIENT
Start: 2024-02-16 | End: 2024-02-16

## 2024-02-16 RX ORDER — ONDANSETRON 2 MG/ML
4 INJECTION INTRAMUSCULAR; INTRAVENOUS EVERY 6 HOURS PRN
Status: DISCONTINUED | OUTPATIENT
Start: 2024-02-16 | End: 2024-02-19

## 2024-02-16 RX ORDER — ASPIRIN 81 MG/1
81 TABLET, CHEWABLE ORAL ONCE
Status: COMPLETED | OUTPATIENT
Start: 2024-02-16 | End: 2024-02-16

## 2024-02-16 RX ORDER — VENLAFAXINE HYDROCHLORIDE 75 MG/1
225 CAPSULE, EXTENDED RELEASE ORAL DAILY
Status: DISCONTINUED | OUTPATIENT
Start: 2024-02-17 | End: 2024-02-19

## 2024-02-16 RX ORDER — ACETAMINOPHEN 325 MG/1
650 TABLET ORAL EVERY 6 HOURS PRN
Status: DISCONTINUED | OUTPATIENT
Start: 2024-02-16 | End: 2024-02-19

## 2024-02-16 RX ORDER — FENOFIBRATE 134 MG/1
134 CAPSULE ORAL
Status: DISCONTINUED | OUTPATIENT
Start: 2024-02-17 | End: 2024-02-19

## 2024-02-16 RX ORDER — ARIPIPRAZOLE 5 MG/1
5 TABLET ORAL NIGHTLY
Status: DISCONTINUED | OUTPATIENT
Start: 2024-02-16 | End: 2024-02-19

## 2024-02-16 RX ORDER — PREGABALIN 50 MG/1
50 CAPSULE ORAL 3 TIMES DAILY
Status: DISCONTINUED | OUTPATIENT
Start: 2024-02-16 | End: 2024-02-19

## 2024-02-16 RX ORDER — BUSPIRONE HYDROCHLORIDE 5 MG/1
10 TABLET ORAL 2 TIMES DAILY
Status: DISCONTINUED | OUTPATIENT
Start: 2024-02-16 | End: 2024-02-19

## 2024-02-16 RX ORDER — TRAMADOL HYDROCHLORIDE 50 MG/1
50 TABLET ORAL EVERY 6 HOURS PRN
Status: DISCONTINUED | OUTPATIENT
Start: 2024-02-16 | End: 2024-02-19

## 2024-02-16 RX ORDER — ATORVASTATIN CALCIUM 40 MG/1
40 TABLET, FILM COATED ORAL NIGHTLY
Status: DISCONTINUED | OUTPATIENT
Start: 2024-02-16 | End: 2024-02-19

## 2024-02-16 RX ORDER — TRAZODONE HYDROCHLORIDE 50 MG/1
150 TABLET ORAL NIGHTLY
Status: DISCONTINUED | OUTPATIENT
Start: 2024-02-16 | End: 2024-02-19

## 2024-02-16 RX ORDER — HYDROXYZINE HYDROCHLORIDE 25 MG/1
50 TABLET, FILM COATED ORAL NIGHTLY PRN
Status: DISCONTINUED | OUTPATIENT
Start: 2024-02-16 | End: 2024-02-19

## 2024-02-16 RX ORDER — ONDANSETRON 2 MG/ML
4 INJECTION INTRAMUSCULAR; INTRAVENOUS EVERY 4 HOURS PRN
Status: ACTIVE | OUTPATIENT
Start: 2024-02-16 | End: 2024-02-17

## 2024-02-16 NOTE — ED QUICK NOTES
Orders for admission, patient is aware of plan and ready to go upstairs. Any questions, please call ED RN Frances at extension [phone].     Patient Covid vaccination status: Fully vaccinated     COVID Test Ordered in ED: None    COVID Suspicion at Admission: N/A    Running Infusions:  None    Mental Status/LOC at time of transport: Alert, oriented X4.    Other pertinent information: Uses bedside commode. Still need UA, as patient missed the hat once, put a wipe in urine the 2nd time.  CIWA score: N/A   NIH score:  N/A

## 2024-02-16 NOTE — ED QUICK NOTES
Patient states she had a coil placed in AVM of her chest about 20 years ago. Has had MRI since then. MRI states they can't do MRI until they have information on this coil. Daughter going home to get information on coil.

## 2024-02-16 NOTE — ED INITIAL ASSESSMENT (HPI)
Patient to ER from home via EMS d/t a fall today. Patient states she felt dizzy & lost her footing. Landed on left shoulder. No LOC. Not on a blood thinner. Patient seen here yesterday for stroke like symptoms.

## 2024-02-16 NOTE — ED PROVIDER NOTES
Patient Seen in: [facility] Emergency Department      History     Chief Complaint   Patient presents with    Fall    Fatigue            Stated Complaint: fall, landed on left shoulder. fatigue & dizzy. No LOC. not on a blood thinner.*    Subjective:   HPI    Patient is a 63-year-old female who has been having frequent visits for the last 10 days.  She has been feeling lightheaded and dizzy.  The patient states that intermittently she has some word finding difficulty.  She had been seen on January 6 22nd 2024 and was had a small subdural that had actually gotten better.  Patient was seen approximately on February 12 for UTI and then on February 15 she complained of generalized weakness tiredness.,  At that time she had a CT of the brain.  Today she says she was walking just turned and fell.  She did not pass out.  She says she has been intermittently having some dizziness which she describes as lightheadedness, dizziness.  She also complained of some times word finding difficulty.  She denies any new weakness on one side did complain of generalized weakness she does sometimes in the right leg feels a little bit weaker she has a history of a rotator cuff injury.  She has some pain in the left shoulder.  Patient is she when she fell she did not pass out.  She did fell onto her left shoulder she has some chronic arthritis and she has a history of rotator cuff injury.  And she has some mild pain on the left shoulder after the fall.  But she had some mild pain prior to the all this because she had rotator cuff injury.  History of depression, diabetes high blood pressure high cholesterol.  Hyperlipidemia.  Hypertension.       CT BRAIN OR HEAD ([id number])    Result Date: 2/15/2024  PROCEDURE:  CT BRAIN OR HEAD ([id number])  COMPARISON:  [facility], CT, CT BRAIN OR HEAD ([id number]), 2/12/2024, 10:06 PM.  INDICATIONS:  AMS  TECHNIQUE:  Noncontrast CT scanning is performed through the brain. Dose reduction techniques were used. Dose  information is transmitted to the ACR (American College of Radiology) NRDR (National Radiology Data Registry) which includes the Dose Index Registry.  PATIENT STATED HISTORY: (As transcribed by Technologist)  Patient with altered mental status.    FINDINGS:  Ventricles and sulci are within normal limits.  Trace chronic small vessel ischemic disease.  There is no midline shift or mass-effect.  The basal cisterns are patent.  The gray-white matter differentiation is intact.  There is no acute intracranial hemorrhage or extra-axial fluid collection.   There is no evident fracture.  The visualized paranasal sinuses and mastoid air cells are unremarkable.            CONCLUSION:  1. No acute intracranial hemorrhage or hydrocephalus. 2. Trace chronic small vessel ischemic disease. If there is clinical concern for acute ischemia/infarction, an MRI of the brain would be recommended for further evaluation.    LOCATION:  [place]   Dictated by (CST): Stromberg, LeRoy, MD on 2/15/2024 at 3:26 PM     Finalized by (CST): Stromberg, LeRoy, MD on 2/15/2024 at 3:28 PM               Objective:   Past Medical History:   Diagnosis Date    Acute subdural hematoma (HCC) 01/2024    per pt currently resolved    Anxiety state     Back pain     Back problem     Depression     Diabetes (HCC)     High blood pressure     High cholesterol     Hypokalemia     Hypomagnesemia     Insomnia     Neuropathy     Osteoarthritis     Other and unspecified hyperlipidemia     Unspecified essential hypertension     Visual impairment     glasses              Past Surgical History:   Procedure Laterality Date    ANESTH,SHOULDER REPLACEMENT      right    ARTHROTOMY,OPEN REPAIR MENISCUS Left 01/13/2020    BACK SURGERY  2000/2001    laminectomy L4-5-S1    CHOLECYSTECTOMY      EXCIS LUMBAR DISK,ONE LEVEL  2015    redo L5-S1 discectomy    HYSTERECTOMY  2004    partial    OTHER      AVM malformation with coil put in place right side of chest     SPINE SURGERY  PROCEDURE UNLISTED      TOTAL KNEE REPLACEMENT Bilateral 2022    R Knee Replacement                Social History     Socioeconomic History    Marital status:    Tobacco Use    Smoking status: Former     Packs/day: 1.00     Years: 10.00     Additional pack years: 0.00     Total pack years: 10.00     Types: Cigarettes     Quit date: 3/25/2000     Years since quittin.9     Passive exposure: Never    Smokeless tobacco: Never   Vaping Use    Vaping Use: Never used   Substance and Sexual Activity    Alcohol use: No     Comment: rare    Drug use: Never    Sexual activity: Not Currently     Partners: Male     Social Determinants of Health     Food Insecurity: No Food Insecurity (2024)    Food Insecurity     Food Insecurity: Never true   Transportation Needs: No Transportation Needs (2024)    Transportation Needs     Lack of Transportation: No   Housing Stability: Low Risk  (2024)    Housing Stability     Housing Instability: No              Review of Systems    Positive for stated complaint: fall, landed on left shoulder. fatigue & dizzy. No LOC. not on a blood thinner.*  Other systems are as noted in HPI.  Constitutional and vital signs reviewed.      All other systems reviewed and negative except as noted above.    Physical Exam     ED Triage Vitals   BP 24 1324 152/85   Pulse 24 1326 60   Resp 24 1324 18   Temp 24 1319 98.3 °F (36.8 °C)   Temp src 24 1319 Temporal   SpO2 24 1326 95 %   O2 Device 24 1326 None (Room air)       Current:/74   Pulse 60   Temp 98.3 °F (36.8 °C) (Temporal)   Resp 17   Ht 175.3 cm (5' 9\")   Wt 103.4 kg   SpO2 98%   BMI 33.67 kg/m²         Physical Exam    General: .  No obvious signs of trauma to head or neck.  No midline neck tenderness.  The patient is in no respiratory distress. The patient is not septic or toxic    HEENT: Atraumatic, conjunctiva are not pale.  There is no icterus.  Oral mucosa Is wet.  No  facial trauma.  The neck is supple.    LUNGS: Clear to auscultation, there is no wheezing or retraction.  No crackles.    CV: Cardiovascular is regular without murmurs or rubs.    ABD: The abdomen is soft nondistended nontender.  There is no rebound.  There is no guarding.    EXT: There is good pulses bilaterally.  There is no calf tenderness.  There is no rash noted.  There is no edema  Mild tenderness to the left shoulder.  NEURO: Alert and oriented x3.  Muscle strength and sensory exam is grossly normal.  Although subjectively she seems a little bit more weaker on the right leg.  And the patient is neurologically intact with no focal findings.    Pupils equal reactive EXTR muscle intact she has a normal clear speech.  No facial asymmetry is noted.  Sensory exam is grossly negative.    ED Course     Labs Reviewed   COMP METABOLIC PANEL (14) - Abnormal; Notable for the following components:       Result Value    Glucose 115 (*)     Alkaline Phosphatase 41 (*)     All other components within normal limits   URINALYSIS WITH CULTURE REFLEX - Abnormal; Notable for the following components:    Urine Color Colorless (*)     All other components within normal limits   TROPONIN I HIGH SENSITIVITY - Normal   PROCALCITONIN - Normal   ETHYL ALCOHOL - Normal   CBC WITH DIFFERENTIAL WITH PLATELET    Narrative:     The following orders were created for panel order CBC With Differential With Platelet.  Procedure                               Abnormality         Status                     ---------                               -----------         ------                     CBC W/ DIFFERENTIAL[[id number]]                              Final result                 Please view results for these tests on the individual orders.   RAINBOW DRAW LAVENDER   RAINBOW DRAW LIGHT GREEN   RAINBOW DRAW BLUE   RAINBOW DRAW GOLD   CBC W/ DIFFERENTIAL                      The patient was placed on monitors, IV was started, blood was drawn.     The  EKG shows normal sinus rhythm.  There is no acute ST elevations or ischemic findings.  The rest of the EKG including rate rhythm axis and intervals I agree with the EKG report . The rate is 60      MDM   The patient's procalcitonin was not elevated alcohol was not elevated, urinalysis negative comprehensive troponin, CBC was normal.      Admission disposition: 2/16/2024  7:54 PM       Attempt to get an MRI to rule out any intracranial bleed, stroke, mass.  Unfortunately the patient could not be getting an MRI as MRI refused that she had some coil in her chest long time ago.  Although she has had MRIs in 2022.  I personally reviewed the radiographs and my individual interpretation shows  CT of the brain shows no acute bleed, tumor.  Chest x-ray show interstitial changes.  Left shoulder show no fracture, dislocation.  Also reviewed official report and it shows    CTA BRAIN + CTA CAROTIDS (CPT=70496/92413)    Result Date: 2/16/2024  PROCEDURE:  CTA BRAIN + CTA CAROTIDS (CPT=70496/87985)  COMPARISON:  [facility] , CT, CT BRAIN OR HEAD ([id number]), 2/15/2024, 3:14 PM.  INDICATIONS:  fall, landed on left shoulder. fatigue & dizzy. No LOC. not on a blood thinner. seen here yesterday for stroke like symptoms  TECHNIQUE:  CT angiography of the head and neck were obtained with non-ionic contrast.  3D volume rendering images were obtained by the technologist as well as the radiologist on an independent workstation.  Multiplanar 3D reformatted imaging including multiplanar MIP images were obtained.  Curved planar reformats were performed through the carotid and vertebral arteries. All measurements obtained in this exam were performed using NASCET criteria.  Dose reduction techniques were used. Dose information is transmitted to the ACR (American College of Radiology) NRDR (National Radiology Data Registry) which includes the Dose Index Registry.  PATIENT STATED HISTORY:(As transcribed by Technologist)  Patient fell and landed on left  side, altered mental status, fatigue.   CONTRAST USED:  75cc of Isovue 370  FINDINGS:   HEAD:  No intracranial hemorrhage, mass effect, or acute large vessel transcortical infarct.  Diffuse sulcal prominence concordant with age.  Gray-white differentiation is preserved.  No hydrocephalus.  Basilar cisterns are patent.  Minimal intracranial atherosclerosis.  No pathologic parenchymal or meningeal enhancement.  Paranasal sinuses and mastoid air cells are clear.  No depressed calvarial fracture.  No scalp hematoma.  ANGIOGRAM:  NECK:  Patency of the aortic arch and origins of the great vessels.  Patency of both vertebral arteries without significant stenosis.  Right common carotid artery is patent.  Calcifications and soft tissue plaque at the right carotid bifurcation and involving the proximal cervical internal carotid artery without evidence of hemodynamically significant stenosis (less than 50 percent per NASCET criteria).  Right cervical internal carotid artery is patent.  Right external carotid artery is patent.  Left common carotid artery is patent.  Calcifications and soft tissue plaque at the left carotid bifurcation and involving the proximal cervical internal carotid artery without evidence of hemodynamically significant stenosis (less than 50 percent per NASCET criteria).  Left cervical internal carotid artery is patent.  Left external carotid artery is patent.  BRAIN:  There is patency of the anterior circulation including the bilateral intracranial internal carotid arteries, and the A1 and M1 segments of the anterior and middle cerebral arteries.  There is patency of the posterior circulation including the bilateral distal vertebral arteries, basilar artery, and P1 segments of the posterior cerebral arteries.  There is no focal saccular aneurysm.  Please note CTA is less sensitive for aneurysms less than 3 mm in size.  INCIDENTAL:  Multiple subcentimeter hypodense nodules of the left thyroid lobe noted.              CONCLUSION:  1. No large vessel occlusion, hemodynamically significant stenosis, dissection or aneurysm of the major arterial vasculature of the head or neck.  2. No acute intracranial process.  3. Subcentimeter hypodense nodules of the left thyroid lobe, best assessed with thyroid ultrasound.    LOCATION:  [facility]   Dictated by (CST): Jose Luis Garcia MD on 2/16/2024 at 7:18 PM     Finalized by (CST): Jose Luis Garcia MD on 2/16/2024 at 7:27 PM       XR SHOULDER, COMPLETE (MIN 2 VIEWS), LEFT (CPT=73030)    Result Date: 2/16/2024  PROCEDURE:  XR SHOULDER, COMPLETE (MIN 2 VIEWS), LEFT (CPT=73030)  TECHNIQUE:  Multiple views were obtained.  COMPARISON:  [facility], XR, XR CHEST AP PORTABLE  (CPT=71045), 2/12/2024, 10:43 PM.  [facility] , XR, XR CHEST AP PORTABLE  (CPT=71045), 12/03/2023, 5:37 PM.  [facility], XR, XR SHOULDER, COMPLETE (MIN 2 VIEWS), LEFT (CPT=73030), 7/27/2022, 8:05 PM.  [facility]  , XR, XR CHEST AP PORTABLE  (CPT=71045), 2/16/2024, 2:18 PM.  INDICATIONS:  fall, landed on left shoulder. fatigue & dizzy. No LOC. not on a blood thinner. seen here yesterday for stroke like symptoms  PATIENT STATED HISTORY: (As transcribed by Technologist)  Patient fell and landed on her left shoulder. Patient is due to have left shoulder surgery in two weeks to fix a torn rotator cuff.             CONCLUSION:  There is AC joint hypertrophy with lateral downsloping of the acromion.  Scapular view is rotated limiting evaluation.  No definite shoulder dislocation or acute fracture.  There is high-riding humeral head in relation the glenoid concerning for possible impingement.   LOCATION:  [id number]   Dictated by (CST): Shyla Caldwell MD on 2/16/2024 at 2:51 PM     Finalized by (CST): Shyla Caldwell MD on 2/16/2024 at 2:54 PM       XR CHEST AP PORTABLE  (CPT=71045)    Result Date: 2/16/2024  PROCEDURE:  XR CHEST AP PORTABLE  (CPT=71045)  TECHNIQUE:  AP chest radiograph was obtained.  COMPARISON:  [facility], XR, XR CHEST AP PORTABLE   (CPT=71045), 2/12/2024, 10:43 PM.  INDICATIONS:  fall, landed on left shoulder. fatigue & dizzy. No LOC. not on a blood thinner. seen here yesterday for stroke like symptoms  PATIENT STATED HISTORY: (As transcribed by Technologist)  Patient fell and landed on her left shoulder.              CONCLUSION:  Focal infiltrate in the left lung base.  Mild increase in the cardiac silhouette pulmonary vascularity upper limits of  Increased interstitial markings throughout unchanged.  Postsurgical changes right shoulder.   LOCATION:  [id number]      Dictated by (CST): Shyla Caldwell MD on 2/16/2024 at 2:49 PM     Finalized by (CST): Shyla Caldwell MD on 2/16/2024 at 2:51 PM       CT BRAIN OR HEAD ([id number])    Result Date: 2/15/2024  PROCEDURE:  CT BRAIN OR HEAD ([id number])  COMPARISON:  [facility], CT, CT BRAIN OR HEAD ([id number]), 2/12/2024, 10:06 PM.  INDICATIONS:  AMS  TECHNIQUE:  Noncontrast CT scanning is performed through the brain. Dose reduction techniques were used. Dose information is transmitted to the ACR (American College of Radiology) NRDR (National Radiology Data Registry) which includes the Dose Index Registry.  PATIENT STATED HISTORY: (As transcribed by Technologist)  Patient with altered mental status.    FINDINGS:  Ventricles and sulci are within normal limits.  Trace chronic small vessel ischemic disease.  There is no midline shift or mass-effect.  The basal cisterns are patent.  The gray-white matter differentiation is intact.  There is no acute intracranial hemorrhage or extra-axial fluid collection.   There is no evident fracture.  The visualized paranasal sinuses and mastoid air cells are unremarkable.            CONCLUSION:  1. No acute intracranial hemorrhage or hydrocephalus. 2. Trace chronic small vessel ischemic disease. If there is clinical concern for acute ischemia/infarction, an MRI of the brain would be recommended for further evaluation.    LOCATION:  [place]   Dictated by (CST): Stromberg, LeRoy, MD  on 2/15/2024 at 3:26 PM     Finalized by (CST): Stromberg, LeRoy, MD on 2/15/2024 at 3:28 PM       The patient's case was discussed with the [facility] hospitalist, I have also discussed this case with Dr. David Patient's symptoms are going on for 1 week and the family has been confused more dizzy unsteady gait, although nih is right leg although she can move everything.  There is no focal findings.  0.  At this present time no focal findings.  She feels subjective weakness on her right leg.  She was given empirically aspirin.  She does not meet criteria for tPA.  Outside the window she has had it for over a week.   patient will be admitted for further evaluation and reexamination neurologic intact no focal findings at this present time.                         Medical Decision Making      Disposition and Plan     Clinical Impression:  1. Altered mental status, unspecified altered mental status type         Disposition:  Admit  2/16/2024  7:54 pm    Follow-up:  No follow-up provider specified.        Medications Prescribed:  Current Discharge Medication List                            Hospital Problems       Present on Admission  Date Reviewed: 4/28/2023            ICD-10-CM Noted POA    * (Principal) Altered mental status, unspecified altered mental status type R41.82 2/16/2024 Unknown

## 2024-02-16 NOTE — CM/SW NOTE
CM called Gety Kyle Edmond NP office [phone] and [facility] for staff to return CM call to assist patient with making an appointment asap for a dementia work up.

## 2024-02-16 NOTE — CM/SW NOTE
CHARLENE spoke with Robert at [facility] who will call patients daughter, Portia, to assist with making an appointment as soon as possible.

## 2024-02-16 NOTE — ED QUICK NOTES
Patient's family returned, they don't have any information on the coil. It was done 20 years ago at [facility]. ER MD notified. MRI notified.

## 2024-02-17 ENCOUNTER — APPOINTMENT (OUTPATIENT)
Dept: GENERAL RADIOLOGY | Facility: HOSPITAL | Age: 64
End: 2024-02-17
Attending: HOSPITALIST
Payer: MEDICARE

## 2024-02-17 PROBLEM — R29.6 RECURRENT FALLS: Status: ACTIVE | Noted: 2018-08-28

## 2024-02-17 PROBLEM — R26.9 GAIT ABNORMALITY: Status: ACTIVE | Noted: 2024-02-17

## 2024-02-17 PROBLEM — R41.3 MEMORY LOSS: Status: ACTIVE | Noted: 2024-02-17

## 2024-02-17 LAB
AMMONIA PLAS-MCNC: 25 UMOL/L (ref 11–32)
ANION GAP SERPL CALC-SCNC: 5 MMOL/L (ref 0–18)
BUN BLD-MCNC: 20 MG/DL (ref 9–23)
CALCIUM BLD-MCNC: 9.8 MG/DL (ref 8.5–10.1)
CHLORIDE SERPL-SCNC: 110 MMOL/L (ref 98–112)
CO2 SERPL-SCNC: 28 MMOL/L (ref 21–32)
CREAT BLD-MCNC: 0.95 MG/DL
EGFRCR SERPLBLD CKD-EPI 2021: 66 ML/MIN/1.73M2 (ref 60–?)
ERYTHROCYTE [DISTWIDTH] IN BLOOD BY AUTOMATED COUNT: 12.4 %
EST. AVERAGE GLUCOSE BLD GHB EST-MCNC: 108 MG/DL (ref 68–126)
GLUCOSE BLD-MCNC: 108 MG/DL (ref 70–99)
HBA1C MFR BLD: 5.4 % (ref ?–5.7)
HCT VFR BLD AUTO: 39.5 %
HGB BLD-MCNC: 13.2 G/DL
MCH RBC QN AUTO: 29.9 PG (ref 26–34)
MCHC RBC AUTO-ENTMCNC: 33.4 G/DL (ref 31–37)
MCV RBC AUTO: 89.4 FL
OSMOLALITY SERPL CALC.SUM OF ELEC: 299 MOSM/KG (ref 275–295)
PLATELET # BLD AUTO: 338 10(3)UL (ref 150–450)
POTASSIUM SERPL-SCNC: 3.9 MMOL/L (ref 3.5–5.1)
PROCALCITONIN SERPL-MCNC: <0.05 NG/ML (ref ?–0.16)
RBC # BLD AUTO: 4.42 X10(6)UL
SODIUM SERPL-SCNC: 143 MMOL/L (ref 136–145)
TSI SER-ACNC: 1.98 MIU/ML (ref 0.36–3.74)
VIT B12 SERPL-MCNC: 204 PG/ML (ref 193–986)
WBC # BLD AUTO: 8.4 X10(3) UL (ref 4–11)

## 2024-02-17 PROCEDURE — 71046 X-RAY EXAM CHEST 2 VIEWS: CPT | Performed by: HOSPITALIST

## 2024-02-17 PROCEDURE — 99223 1ST HOSP IP/OBS HIGH 75: CPT | Performed by: OTHER

## 2024-02-17 RX ORDER — FLUTICASONE PROPIONATE 50 MCG
1 SPRAY, SUSPENSION (ML) NASAL 2 TIMES DAILY
Status: DISCONTINUED | OUTPATIENT
Start: 2024-02-17 | End: 2024-02-19

## 2024-02-17 RX ORDER — TEMAZEPAM 7.5 MG/1
7.5 CAPSULE ORAL NIGHTLY PRN
Status: DISCONTINUED | OUTPATIENT
Start: 2024-02-17 | End: 2024-02-18

## 2024-02-17 RX ORDER — ALPRAZOLAM 0.5 MG/1
0.5 TABLET ORAL ONCE
Status: DISCONTINUED | OUTPATIENT
Start: 2024-02-17 | End: 2024-02-18

## 2024-02-17 NOTE — PHYSICAL THERAPY NOTE
PHYSICAL THERAPY EVALUATION - INPATIENT     Room Number: 3618/3618-A  Evaluation Date: 2/17/2024  Type of Evaluation: Initial  Physician Order: PT Eval and Treat    Presenting Problem: dizziness causing fall  Co-Morbidities : htn, hld, dm2, gerd, recent fall and sdh  Reason for Therapy: Mobility Dysfunction and Discharge Planning    IMAGING 2/16/24 CTA BRAIN  1. No large vessel occlusion, hemodynamically significant stenosis, dissection or aneurysm of the major arterial vasculature of the head or neck.   2. No acute intracranial process.   3. Subcentimeter hypodense nodules of the left thyroid lobe, best assessed with thyroid ultrasound.        PHYSICAL THERAPY ASSESSMENT   Patient is currently functioning near baseline with bed mobility, transfers, gait, stair negotiation, and performing household tasks.  Prior to admission, patient's baseline is John to independent.  Patient is requiring stand-by assist and contact guard assist as a result of the following impairments: decreased endurance/aerobic capacity, impaired static and dynamic standing balance, impaired coordination, impaired motor planning, decreased muscular endurance, and cognitive deficits (minor forgetfulness).  Physical Therapy will continue to follow for duration of hospitalization.    Patient will benefit from continued skilled PT Services at discharge to promote functional independence in home.  Anticipate patient will return home with home health PT.    PLAN  PT Treatment Plan: Bed mobility;Body mechanics;Coordination;Endurance;Energy conservation;Patient education;Family education;Gait training;Neuromuscular re-educate;Range of motion;Strengthening;Stoop training;Stair training;Transfer training;Balance training  Rehab Potential : Good  Frequency (Obs): 3-5x/week  Number of Visits to Meet Established Goals: 3      CURRENT GOALS    Goal #1 Patient is able to demonstrate supine - sit EOB @ level: independent     Goal #2 Patient is able to  demonstrate transfers Sit to/from Stand at assistance level: modified independent     Goal #3 Patient is able to ambulate 50 feet with assist device: walker - rolling at assistance level: supervision     Goal #4 Pt able to ambulate 150 feet w/ RW at supervision.   Goal #5 Pt able to ascend/descend flight of stairs at supervision.   Goal #6 Complete short form bernal balance assessment.    Goal Comments: Goals established on 2024    PHYSICAL THERAPY MEDICAL/SOCIAL HISTORY  History related to current admission: Patient is a 63 year old female admitted on 2024 from home for dizziness causing fall.  Workup still ongoing at this point, MRI pending, CTA Brain (-) for any acute processes.    HOME SITUATION  Type of Home: [facility]   Home Layout: Two level;Bed/bath upstairs  Stairs to Enter : 1  Railing: Yes  Stairs to Bedroom: 14  Railing: Yes          Patient Owned Equipment: Rolling walker  Patient Regularly Uses: Glasses    Prior Level of [place]: Per pt and pt's daughter present during session- lives in two [facility], pt's bed/bath upstairs. Lives with daughter and two grandchildren- all able to assist patient. She ambulates sometimes with RW (has one upstairs and one on main floor). About 10-15 falls in last six months. One fall causing SDH. No longer drives. Has had bilateral knee replacements; pt wonders if her legs buckle b/c of the knees. Supposed to have total shoulder replacement in about a week or so. Reports she doesn't know why she falls, but she is all of a sudden on the ground. Sometimes gets dizzy.     SUBJECTIVE  \"I was having an anxiety attack earlier.\"       OBJECTIVE  Precautions: Bed/chair alarm  Fall Risk: Standard fall risk    WEIGHT BEARING RESTRICTION  Weight Bearing Restriction: None                PAIN ASSESSMENT  Ratin  Location: denies       COGNITION  Overall Cognitive Status:  WFL - within functional limits    RANGE OF MOTION AND STRENGTH ASSESSMENT  Upper extremity ROM  and strength are within functional limits   Lower extremity ROM is within functional limits   Lower extremity strength is within functional limits       BALANCE  Static Sitting: Good  Dynamic Sitting: Fair +  Static Standing: Fair  Dynamic Standing: Fair -    ADDITIONAL TESTS                                    ACTIVITY TOLERANCE                         O2 WALK       NEUROLOGICAL FINDINGS                        AM-PAC '6-Clicks' INPATIENT SHORT FORM - BASIC MOBILITY  How much difficulty does the patient currently have...  Patient Difficulty: Turning over in bed (including adjusting bedclothes, sheets and blankets)?: A Little   Patient Difficulty: Sitting down on and standing up from a chair with arms (e.g., wheelchair, bedside commode, etc.): A Little   Patient Difficulty: Moving from lying on back to sitting on the side of the bed?: A Little   How much help from another person does the patient currently need...   Help from Another: Moving to and from a bed to a chair (including a wheelchair)?: A Little   Help from Another: Need to walk in hospital room?: A Little   Help from Another: Climbing 3-5 steps with a railing?: A Little       AM-PAC Score:  Raw Score: 18   Approx Degree of Impairment: 46.58%   Standardized Score (AM-PAC Scale): 43.63   CMS Modifier (G-Code): CK    FUNCTIONAL ABILITY STATUS  Gait Assessment   Functional Mobility/Gait Assessment  Gait Assistance: Supervision;Contact guard assist  Distance (ft): 50  Assistive Device: Rolling walker  Pattern: Shuffle    Skilled Therapy Provided     Bed Mobility:  Rolling: NT  Supine to sit: supervision   Sit to supine: NT     Transfer Mobility:  Sit to stand: supervision w/ RW - v/c for hand placement   Stand to sit: supervision  Gait = CGA with very few periods of supervision w/ RW x 50 feet. Shuffle pattern. Verbal cues to remain within boundaries of RW and need for safety and attention when turning.     Therapist's Comments:   Patient presents sitting up in  bed. Daughter present in room. Discussed role and goal of physical therapy in hospital setting. Pt in agreement to session. Endorses some anxiety about why these confusion and falling episodes keep happening. Denies any vision changes, does report word finding difficulties and slurred speech at times.   Bed mobility at supervision level. Once sitting EOB, able to independently don socks. Reports incr dizziness in sitting. BP WNL.  Sit to stand to RW- v/c for hand placement. Ambulated 50 feet w/ RW at [facility]. Pt reporting incr difficulty any further due to feeling dizzy and off balance. Pt upright in chair at end of session.   Educated on importance of continued mobility and encouraged continued ambulation with nursing staff. Discussed safety techniques with the onset of dizziness: finding focal point to stare at, sit down on sturdy surface, have someone be very close in case you fall. Pt and daughter verbalize understanding.      Exercise/Education Provided:  Bed mobility  Body mechanics  Energy conservation  Functional activity tolerated  Gait training  Neuromuscular re-educate  Posture  Transfer training    Patient End of Session: Up in chair;Needs met;Call light within reach;RN aware of session/findings;All patient questions and concerns addressed;With  staff;Family present;Discussed recommendations with /      Patient Evaluation Complexity Level:  History Moderate - 1 or 2 personal factors and/or co-morbidities   Examination of body systems Low - addressing 1-2 elements   Clinical Presentation Low - Stable   Clinical Decision Making Low - Stable     PT Session Time: 28 minutes  Gait Trainin minutes  Therapeutic Activity: 10 minutes

## 2024-02-17 NOTE — CONSULTS
[facility]    Report of Consultation    Shalonda Batista Patient Status:  Inpatient    1960 MRN [id number]   Location [facility] 3NE-A Attending Som Simpson,    Hosp Day # 1 PCP Coco Du DO     Date of Admission:  2024  Altered mental status, unspecified altered mental status type [R41.82]    A/P  Recurrent fall   Gait imbalance  History of peripheral neuropathy and osteoarthritis  Depressive disorder    Fall and gait imbalance likely due to arthritis/knee weakness post surgery and peripheral neuropathy  No Parkinsonism or any signs of stroke.     MRI brain recommended but due to presence of ? lung metal coil needs radiology clearance    Check Vit b12 level ( was deficient in the past)   Repeat EMG and NCS as outpatient    Neuropsychology evaluation as outpatient recommended for cognitive impairment.      PT/OT evaluation    Discussed with the patient's family at bedside.     Thank you for the consult    Cecily David MD  [facility]        History of Present Illness:  Shalonda Batista is a a(n) 63 year old female with PMH as listed below who presents with dizziness and fall. Recently has been having frequent falls. She had been seen on  and was had a small subdural and was admitted for observation.  Patient was seen approximately on  for UTI and then on February 15 she complained of generalized weakness tiredness., At that time she had a CT of the brain and small SDH resolved.  Yesterday at home she says she was walking just turned and fell. She did not pass out. She says she has been intermittently having some dizziness which she describes as lightheadedness, dizziness. She also complained of some times word finding difficulty.  States her knees are feeling weak, has both knee replacement done in  and knees have give out on few occasion leading to fall.   Family at bedside who reports in addition to above they have noticed  changes in her memory and cognition. At baseline has chronic depression and on multiple psych meds but no changes in dose recently.        History:  Past Medical History:   Diagnosis Date    Acute subdural hematoma (HCC) 01/2024    per pt currently resolved    Anxiety state     Back pain     Back problem     Depression     Diabetes (HCC)     High blood pressure     High cholesterol     Hypokalemia     Hypomagnesemia     Insomnia     Neuropathy     Osteoarthritis     Other and unspecified hyperlipidemia     Unspecified essential hypertension     Visual impairment     glasses     Past Surgical History:   Procedure Laterality Date    ANESTH,SHOULDER REPLACEMENT      right    ARTHROTOMY,OPEN REPAIR MENISCUS Left 01/13/2020    BACK SURGERY  2000/2001    laminectomy L4-5-S1    CHOLECYSTECTOMY      EXCIS LUMBAR DISK,ONE LEVEL  2015    redo L5-S1 discectomy    HYSTERECTOMY  2004    partial    OTHER      AVM malformation with coil put in place right side of chest     SPINE SURGERY PROCEDURE UNLISTED      TOTAL KNEE REPLACEMENT Bilateral 05/2022    R Knee Replacement     Family History   Problem Relation Age of Onset    Cancer Father 79        Lung cancer    Cancer Son     Other (Other) Son         MVA    Cancer Maternal Grandmother         Skin cancer    Other (Other) Maternal Grandfather         TB    Heart Disorder Paternal Grandmother 75        Heart attack    Psychiatric Sister         Depression    Breast Cancer Sister 65        sister at age of 65    Heart Disorder Brother 56        Triple bypass    Hypertension Brother       reports that she quit smoking about 23 years ago. Her smoking use included cigarettes. She has a 10 pack-year smoking history. She has never been exposed to tobacco smoke. She has never used smokeless tobacco. She reports that she does not drink alcohol and does not use drugs.    Allergies:  Allergies   Allergen Reactions    Oxycodone NAUSEA ONLY, DIZZINESS and FACE FLUSHING     Nausea,  dizziness         Medications:    Current Facility-Administered Medications:     temazepam (Restoril) cap 7.5 mg, 7.5 mg, Oral, Nightly PRN    fluticasone propionate (Flonase) 50 MCG/ACT nasal suspension 1 spray, 1 spray, Each Nare, BID    ALPRAZolam (Xanax) tab 0.5 mg, 0.5 mg, Oral, Nightly PRN    ARIPiprazole (Abilify) tab 5 mg, 5 mg, Oral, Nightly    atorvastatin (Lipitor) tab 40 mg, 40 mg, Oral, Nightly    lisinopril (Prinivil; Zestril) tab 40 mg, 40 mg, Oral, Daily    busPIRone (Buspar) tab 10 mg, 10 mg, Oral, BID    fenofibrate micronized (Lofibra) cap 134 mg, 134 mg, Oral, Daily with breakfast    hydrOXYzine (Atarax) tab 50 mg, 50 mg, Oral, Nightly PRN    metoprolol succinate ER (Toprol XL) 24 hr tab 25 mg, 25 mg, Oral, Nightly    pregabalin (Lyrica) cap 50 mg, 50 mg, Oral, TID    traMADol (Ultram) tab 50 mg, 50 mg, Oral, Q6H PRN    traZODone (Desyrel) tab 150 mg, 150 mg, Oral, Nightly    venlafaxine ER (Effexor-XR) 24 hr cap 225 mg, 225 mg, Oral, Daily    acetaminophen (Tylenol) tab 650 mg, 650 mg, Oral, Q6H PRN    ondansetron (Zofran) 4 MG/2ML injection 4 mg, 4 mg, Intravenous, Q6H PRN    Review of Systems:  A 10-point system was reviewed.  Pertinent positives and negatives are noted in HPI.      Physical Exam:  Blood pressure 120/74, pulse 89, temperature 97.5 °F (36.4 °C), temperature source Oral, resp. rate 18, height 69\", weight 228 lb (103.4 kg), SpO2 91%, not currently breastfeeding.    GENERAL:  Patient is a(n) 63 year old female in no acute distress.  HEENT:  Normocephalic, atraumatic  LUNGS: Clear to auscultation bilaterally.   HEART:  S1, S2, Regular rate and rhythm.  Psych: normal mood and affect    NEUROLOGICAL:  This patient is alert and orientated x 3.  Speech fluent. Able to follow simple commands.    Clock drawing - 3/3   Calculations 1/3   Verbal fluency 0/1  and  Recall 1/5    CN: Face is symmetrical.   Pupils equally round and reactive to light.  3+ brisk bilaterally.  EOMs intact.   Visual fields are full.  Tongue is midline.  Uvula and palate elevate symmetrically.  Shrug shoulders normally bilaterally.  The rest of the cranial nerves are grossly intact.      Sensation to light touch is intact bilaterally. Reduce sensation to pinprick both feet    Motor: normal strength except mild right shoulder weakness from rotator surgery and left knee weakness    DTR: 1-2+ biceps and 1-2+ triceps and absent patellar bilaterally post knee replacement    Gait: deferred    Diagnostic Data:   Lab Results   Component Value Date     02/16/2024    K 3.7 02/16/2024     02/16/2024    CO2 26.0 02/16/2024    BUN 18 02/16/2024    CREATSERUM 0.90 02/16/2024     02/16/2024    CA 9.4 02/16/2024    ALKPHO 41 02/16/2024    ALT 17 02/16/2024    AST 17 02/16/2024    BILT 0.3 02/16/2024    ALB 3.5 02/16/2024    TP 6.4 02/16/2024       Imaging:    CT head and CTA head and neck    Impression   CONCLUSION:     1. No large vessel occlusion, hemodynamically significant stenosis, dissection or aneurysm of the major arterial vasculature of the head or neck.     2. No acute intracranial process.     3. Subcentimeter hypodense nodules of the left thyroid lobe, best assessed with thyroid ultrasound.                Assessment/Plan:  Patient Active Problem List   Diagnosis    History of back surgery    Postlaminectomy syndrome, lumbar region    Chest pain    Essential hypertension    IFG (impaired fasting glucose)    Hypertriglyceridemia    Right total knee arthroplasty  Global 04/12/2020    Effusion of right knee    Osteonecrosis of right knee region (HCC)    Acute medial meniscal tear, right, subsequent encounter - possible    Posterior knee pain, right    Right calf pain    Insufficiency fracture of medial femoral condyle (HCC)    Derangement of posterior horn of medial meniscus of right knee    Dizziness    Other closed nondisplaced fracture of proximal end of right humerus with routine healing, subsequent  encounter    Hypokalemia    Multiple falls    Acute pain of right shoulder    Right ear pain    Ceruminosis, right    Complex tear of medial meniscus of left knee as current injury, subsequent encounter    Orthopedic aftercare    Osteoarthritis of glenohumeral joint, right    Status post arthroscopic surgery of left knee    Numbness and tingling in right hand    Injury of right shoulder, initial encounter    Acute postoperative pain of right shoulder    Posterior tibial tendon dysfunction (PTTD) of left lower extremity    BMI 38.0-38.9,adult    Gastroesophageal reflux disease without esophagitis    Acute chest pain    Pedal edema    Peripheral polyneuropathy    Severe obesity (BMI 35.0-39.9) with comorbidity (HCC)    Fatty liver disease, nonalcoholic    Chronic insomnia    Anxiety    Panic disorder    Moderate episode of recurrent major depressive disorder (HCC)    Primary osteoarthritis of left knee    B12 deficiency    Unstable angina (HCC)    Hypomagnesemia    Diarrhea, unspecified type    Dehydration    Hypernatremia    Acute subdural hematoma (HCC)    Fall, initial encounter    Injury of head, initial encounter    Laceration of scalp, initial encounter    Altered mental status, unspecified altered mental status type           Cecily David MD  2/17/2024  12:17 PM

## 2024-02-17 NOTE — PROGRESS NOTES
NURSING ADMISSION NOTE      Patient admitted via Ambulance  Oriented to room.  Safety precautions initiated.  Bed in low position.  Call light in reach.    Received pt to unit at 2045. Admission navigator completed with pt, pt family, and this RN. A&O X3, forgetful/confused at times, easily redirected. RA, VSS, NSR on tele. Lab draw. PIV in R AC flushed and capped. Dressing CDI. Cardiac diet. Pass RN dysphagia. Continent, purewick in place overnight per pt request. High fall risk, bed alarm on. Last BM 2/15. Denies pain. PRN atarax for sleep, see MAR. SBA. PT/OT to see, neuro to see. CT brain and CTA this PM. Neuro check completed upon admission, no significant deficits noted. Limited movement in LUE from torn rotator cuff. Hx MD ARCELIA paged for order clarification, no new orders received.

## 2024-02-17 NOTE — H&P
[facility] H&P/Consult note       CC:   Chief Complaint   Patient presents with    Fall    Fatigue               PCP: Coco Du DO    History of Present Illness: Patient is a 63 year old female with PMH sig for htn, hld, dm2, gerd, recent fall and sdh, here for confusion and repeat falls    Hx from daughter mostly as when pt is giving hx it's not accurate per daughter.     For the past 1 week she has had repeated falls, worse in hte past few days. Mostly when she stands up. Legs give away and she falls. No syncope.   She also has been confsued for hte past 2 weeks worse in the past few days. Cannot remember things, cannnot work the phone / remote. She has also been restless and not sleeping well. She has occasionally had a 'blank stare'  She has not had any headaches, n/v, cp, sob, cough , abd pain, diarrhea, urinary complaints. No n/t in body. No sick contacts. No f/c.       PMH  Past Medical History:   Diagnosis Date    Acute subdural hematoma (HCC) 01/2024    per pt currently resolved    Anxiety state     Back pain     Back problem     Depression     Diabetes (HCC)     High blood pressure     High cholesterol     Hypokalemia     Hypomagnesemia     Insomnia     Neuropathy     Osteoarthritis     Other and unspecified hyperlipidemia     Unspecified essential hypertension     Visual impairment     glasses        PSH  Past Surgical History:   Procedure Laterality Date    ANESTH,SHOULDER REPLACEMENT      right    ARTHROTOMY,OPEN REPAIR MENISCUS Left 01/13/2020    BACK SURGERY  2000/2001    laminectomy L4-5-S1    CHOLECYSTECTOMY      EXCIS LUMBAR DISK,ONE LEVEL  2015    redo L5-S1 discectomy    HYSTERECTOMY  2004    partial    OTHER      AVM malformation with coil put in place right side of chest     SPINE SURGERY PROCEDURE UNLISTED      TOTAL KNEE REPLACEMENT Bilateral 05/2022    R Knee Replacement        ALL:  Allergies   Allergen Reactions    Oxycodone NAUSEA ONLY, DIZZINESS and FACE FLUSHING     Nausea,  dizziness          Home Medications:  Outpatient Medications Marked as Taking for the 24 encounter (Hospital Encounter)   Medication Sig Dispense Refill    hydrOXYzine 50 MG Oral Tab Take 1 tablet (50 mg total) by mouth nightly as needed (insomnia). 20 tablet 0    cephalexin 500 MG Oral Cap Take 1 capsule (500 mg total) by mouth 4 (four) times daily for 7 days. (Patient taking differently: Take 1 capsule (500 mg total) by mouth 4 (four) times daily. 2 doses today, ends Monday) 28 capsule 0    traMADol 50 MG Oral Tab Take 1 tablet (50 mg total) by mouth every 6 (six) hours as needed for Pain.      Benazepril HCl 40 MG Oral Tab Take 0.5 tablets (20 mg total) by mouth daily.      metoprolol succinate ER 25 MG Oral Tablet 24 Hr Take 1 tablet (25 mg total) by mouth at bedtime.      Fenofibrate 160 MG Oral Tab Take 1 tablet (160 mg total) by mouth daily.      ALPRAZolam 0.5 MG Oral Tab Take 1 tablet (0.5 mg total) by mouth nightly as needed for Sleep or Anxiety.      busPIRone 10 MG Oral Tab Take 1 tablet (10 mg total) by mouth in the morning and 1 tablet (10 mg total) before bedtime.      atorvastatin 40 MG Oral Tab Take 1 tablet (40 mg total) by mouth nightly. 30 tablet 2    ARIPiprazole 5 MG Oral Tab Take 1 tablet (5 mg total) by mouth at bedtime.      TRAZODONE 150 MG Oral Tab Take 1 tablet (150 mg total) by mouth nightly. 90 tablet 0    pregabalin 50 MG Oral Cap Take 1 capsule (50 mg total) by mouth 3 (three) times daily. 90 capsule 1    venlafaxine 75 MG Oral Capsule SR 24 Hr Take 3 capsules (225 mg total) by mouth daily. (Patient taking differently: Take 3 capsules (225 mg total) by mouth at bedtime.) 270 capsule 1         Soc Hx  Social History     Tobacco Use    Smoking status: Former     Packs/day: 1.00     Years: 10.00     Additional pack years: 0.00     Total pack years: 10.00     Types: Cigarettes     Quit date: 3/25/2000     Years since quittin.9     Passive exposure: Never    Smokeless tobacco:  Never   Substance Use Topics    Alcohol use: No     Comment: rare        Fam Hx  Family History   Problem Relation Age of Onset    Cancer Father 79        Lung cancer    Cancer Son     Other (Other) Son         MVA    Cancer Maternal Grandmother         Skin cancer    Other (Other) Maternal Grandfather         TB    Heart Disorder Paternal Grandmother 75        Heart attack    Psychiatric Sister         Depression    Breast Cancer Sister 65        sister at age of 65    Heart Disorder Brother 56        Triple bypass    Hypertension Brother        Review of Systems  Comprehensive ROS reviewed and negative except for what's stated above.        OBJECTIVE:  BP (!) 165/84 (BP Location: Left arm)   Pulse 61   Temp 97.6 °F (36.4 °C) (Oral)   Resp 18   Ht 5' 9\" (1.753 m)   Wt 228 lb (103.4 kg)   SpO2 98%   BMI 33.67 kg/m²   General:  Alert, no distress, appears stated age.   Head:  Normocephalic, without obvious abnormality, atraumatic.   Eyes:  Sclera anicteric, No conjunctival pallor, EOMs intact.    Throat: MMM   Neck: Supple    Lungs:   Clear to auscultation bilaterally. Normal effort   Chest wall:  No tenderness or deformity.   Heart:  Regular rate and rhythm    Abdomen:   Soft, NT/ND    Extremities: Atraumatic, no cyanosis or edema.   Skin: No visible rashes or lesions.    Neurologic: Normal strength, no focal deficit appreciated  No drift, normal ftn. Strength symmetrical     Diagnostic Data:    CBC/Chem  Recent Labs   Lab 02/12/24  2201 02/15/24  1332 02/16/24  1333   WBC 6.7 7.2 7.4   HGB 12.1 12.5 12.1   MCV 89.3 88.4 89.5   .0 321.0 304.0       Recent Labs   Lab 02/12/24  2201 02/15/24  1332 02/16/24  1333    141 141   K 3.5 4.0 3.7    110 112   CO2 28.0 27.0 26.0   BUN 20 20 18   CREATSERUM 0.84 0.94 0.90   * 88 115*   CA 9.1 9.2 9.4       Recent Labs   Lab 02/12/24  2201 02/15/24  1332 02/16/24  1333   ALT 19 18 17   AST 10* 22 17   ALB 3.4 3.7 3.5       No results for  input(s): \"TROP\" in the last 168 hours.    Additional Diagnostics: ECG: sinus    CXR: image personally reviewed     Radiology: CTA BRAIN + CTA CAROTIDS (CPT=70496/00493)    Result Date: 2/16/2024  PROCEDURE:  CTA BRAIN + CTA CAROTIDS (CPT=70496/58492)  COMPARISON:  [facility] , CT, CT BRAIN OR HEAD ([id number]), 2/15/2024, 3:14 PM.  INDICATIONS:  fall, landed on left shoulder. fatigue & dizzy. No LOC. not on a blood thinner. seen here yesterday for stroke like symptoms  TECHNIQUE:  CT angiography of the head and neck were obtained with non-ionic contrast.  3D volume rendering images were obtained by the technologist as well as the radiologist on an independent workstation.  Multiplanar 3D reformatted imaging including multiplanar MIP images were obtained.  Curved planar reformats were performed through the carotid and vertebral arteries. All measurements obtained in this exam were performed using NASCET criteria.  Dose reduction techniques were used. Dose information is transmitted to the ACR (American College of Radiology) NRDR (National Radiology Data Registry) which includes the Dose Index Registry.  PATIENT STATED HISTORY:(As transcribed by Technologist)  Patient fell and landed on left side, altered mental status, fatigue.   CONTRAST USED:  75cc of Isovue 370  FINDINGS:   HEAD:  No intracranial hemorrhage, mass effect, or acute large vessel transcortical infarct.  Diffuse sulcal prominence concordant with age.  Gray-white differentiation is preserved.  No hydrocephalus.  Basilar cisterns are patent.  Minimal intracranial atherosclerosis.  No pathologic parenchymal or meningeal enhancement.  Paranasal sinuses and mastoid air cells are clear.  No depressed calvarial fracture.  No scalp hematoma.  ANGIOGRAM:  NECK:  Patency of the aortic arch and origins of the great vessels.  Patency of both vertebral arteries without significant stenosis.  Right common carotid artery is patent.  Calcifications and soft tissue plaque at  the right carotid bifurcation and involving the proximal cervical internal carotid artery without evidence of hemodynamically significant stenosis (less than 50 percent per NASCET criteria).  Right cervical internal carotid artery is patent.  Right external carotid artery is patent.  Left common carotid artery is patent.  Calcifications and soft tissue plaque at the left carotid bifurcation and involving the proximal cervical internal carotid artery without evidence of hemodynamically significant stenosis (less than 50 percent per NASCET criteria).  Left cervical internal carotid artery is patent.  Left external carotid artery is patent.  BRAIN:  There is patency of the anterior circulation including the bilateral intracranial internal carotid arteries, and the A1 and M1 segments of the anterior and middle cerebral arteries.  There is patency of the posterior circulation including the bilateral distal vertebral arteries, basilar artery, and P1 segments of the posterior cerebral arteries.  There is no focal saccular aneurysm.  Please note CTA is less sensitive for aneurysms less than 3 mm in size.  INCIDENTAL:  Multiple subcentimeter hypodense nodules of the left thyroid lobe noted.             CONCLUSION:  1. No large vessel occlusion, hemodynamically significant stenosis, dissection or aneurysm of the major arterial vasculature of the head or neck.  2. No acute intracranial process.  3. Subcentimeter hypodense nodules of the left thyroid lobe, best assessed with thyroid ultrasound.    LOCATION:  [facility]   Dictated by (CST): Jose Luis Garcia MD on 2/16/2024 at 7:18 PM     Finalized by (CST): Jose Luis Garcia MD on 2/16/2024 at 7:27 PM       XR SHOULDER, COMPLETE (MIN 2 VIEWS), LEFT (CPT=73030)    Result Date: 2/16/2024  PROCEDURE:  XR SHOULDER, COMPLETE (MIN 2 VIEWS), LEFT (CPT=73030)  TECHNIQUE:  Multiple views were obtained.  COMPARISON:  [facility], XR, XR CHEST AP PORTABLE  (CPT=71045), 2/12/2024, 10:43 PM.  [facility] , [facility]ALIE,  XR CHEST AP PORTABLE  (CPT=71045), 12/03/2023, 5:37 PM.  [facility], XR, XR SHOULDER, COMPLETE (MIN 2 VIEWS), LEFT (CPT=73030), 7/27/2022, 8:05 PM.  [facility]  , XR, XR CHEST AP PORTABLE  (CPT=71045), 2/16/2024, 2:18 PM.  INDICATIONS:  fall, landed on left shoulder. fatigue & dizzy. No LOC. not on a blood thinner. seen here yesterday for stroke like symptoms  PATIENT STATED HISTORY: (As transcribed by Technologist)  Patient fell and landed on her left shoulder. Patient is due to have left shoulder surgery in two weeks to fix a torn rotator cuff.             CONCLUSION:  There is AC joint hypertrophy with lateral downsloping of the acromion.  Scapular view is rotated limiting evaluation.  No definite shoulder dislocation or acute fracture.  There is high-riding humeral head in relation the glenoid concerning for possible impingement.   LOCATION:  [id number]   Dictated by (CST): Shyla Caldwell MD on 2/16/2024 at 2:51 PM     Finalized by (CST): Shyla Caldwell MD on 2/16/2024 at 2:54 PM       XR CHEST AP PORTABLE  (CPT=71045)    Result Date: 2/16/2024  PROCEDURE:  XR CHEST AP PORTABLE  (CPT=71045)  TECHNIQUE:  AP chest radiograph was obtained.  COMPARISON:  [facility], XR, XR CHEST AP PORTABLE  (CPT=71045), 2/12/2024, 10:43 PM.  INDICATIONS:  fall, landed on left shoulder. fatigue & dizzy. No LOC. not on a blood thinner. seen here yesterday for stroke like symptoms  PATIENT STATED HISTORY: (As transcribed by Technologist)  Patient fell and landed on her left shoulder.              CONCLUSION:  Focal infiltrate in the left lung base.  Mild increase in the cardiac silhouette pulmonary vascularity upper limits of  Increased interstitial markings throughout unchanged.  Postsurgical changes right shoulder.   LOCATION:  [id number]      Dictated by (CST): Shyla Caldwell MD on 2/16/2024 at 2:49 PM     Finalized by (CST): Shyla Caldwell MD on 2/16/2024 at 2:51 PM       CT BRAIN OR HEAD ([id number])    Result Date: 2/15/2024  PROCEDURE:  CT  BRAIN OR HEAD ([id number])  COMPARISON:  [facility], CT, CT BRAIN OR HEAD ([id number]), 2/12/2024, 10:06 PM.  INDICATIONS:  AMS  TECHNIQUE:  Noncontrast CT scanning is performed through the brain. Dose reduction techniques were used. Dose information is transmitted to the ACR (American College of Radiology) NRDR (National Radiology Data Registry) which includes the Dose Index Registry.  PATIENT STATED HISTORY: (As transcribed by Technologist)  Patient with altered mental status.    FINDINGS:  Ventricles and sulci are within normal limits.  Trace chronic small vessel ischemic disease.  There is no midline shift or mass-effect.  The basal cisterns are patent.  The gray-white matter differentiation is intact.  There is no acute intracranial hemorrhage or extra-axial fluid collection.   There is no evident fracture.  The visualized paranasal sinuses and mastoid air cells are unremarkable.            CONCLUSION:  1. No acute intracranial hemorrhage or hydrocephalus. 2. Trace chronic small vessel ischemic disease. If there is clinical concern for acute ischemia/infarction, an MRI of the brain would be recommended for further evaluation.    LOCATION:  [facility]   Dictated by (CST): Stromberg, LeRoy, MD on 2/15/2024 at 3:26 PM     Finalized by (CST): Stromberg, LeRoy, MD on 2/15/2024 at 3:28 PM       XR CHEST AP PORTABLE  (CPT=71045)    Result Date: 2/12/2024  PROCEDURE:  XR CHEST AP PORTABLE  (CPT=71045)  TECHNIQUE:  AP chest radiograph was obtained.  COMPARISON:  [facility] , XR, XR CHEST AP PORTABLE  (CPT=71045), 12/03/2023, 5:37 PM.  [facility] , XR, XR CHEST AP PORTABLE  (CPT=71045), 6/07/2023, 2:32 PM.  INDICATIONS:  hypertension, last bp 186/110. stomach pain  PATIENT STATED HISTORY: (As transcribed by Technologist)  Pt c/o mid anterior stomach pain since 2/7/24, she denies any other chest complications. Pt has hx of coil in right lung and hx of gallbladder removal.    FINDINGS:  Cardiac size and pulmonary vasculature are within  normal limits. No pleural effusions. No pneumothorax.  Calcified granuloma.  Right shoulder arthroplasty.             CONCLUSION:  No acute pulmonary findings.   LOCATION:  [facility]      Dictated by (CST): Jeni Celeste MD on 2/12/2024 at 10:54 PM     Finalized by (CST): Jeni Celeste MD on 2/12/2024 at 10:54 PM       CT BRAIN OR HEAD ([id number])    Result Date: 2/12/2024  PROCEDURE:  CT BRAIN OR HEAD ([id number])  COMPARISON:  [facility] , CT, CT BRAIN OR HEAD ([id number]), 1/23/2024, 8:38 AM.  [facility], CT, CT BRAIN OR HEAD ([id number]), 1/22/2024, 3:23 PM.  INDICATIONS:  hypertension, last bp 186/110. stomach pain  TECHNIQUE:  Noncontrast CT scanning is performed through the brain. Dose reduction techniques were used. Dose information is transmitted to the ACR (American College of Radiology) NRDR (National Radiology Data Registry) which includes the Dose Index Registry.  PATIENT STATED HISTORY: (As transcribed by Technologist)  hypertension and unable to sleep the past 3 days. patient stated she fell a few weeks ago and is feeling dizzy on occasion.    FINDINGS:  The ventricles are symmetric bilaterally. There are patchy areas of low attenuation in the periventricular and deep white matter which are nonspecific but most consistent with small vessel ischemic changes. There is no evidence of hemorrhage, mass, midline shift, or extra-axial fluid collection.  The visualized portions of the orbits are normal.  The visualized paranasal sinuses show no significant sinus disease.. No evidence of depressed skull fracture.            CONCLUSION: 1. No acute intracranial findings 2. Chronic microvascular ischemic changes    LOCATION:  [facility]   Dictated by (CST): Jeni Celeste MD on 2/12/2024 at 10:39 PM     Finalized by (CST): Jeni Celeste MD on 2/12/2024 at 10:40 PM       CT BRAIN OR HEAD ([id number])    Result Date: 1/23/2024  PROCEDURE:  CT BRAIN OR HEAD ([id number])  COMPARISON:  [facility], CT, CT BRAIN OR HEAD ([id number]), 1/22/2024, 3:23 PM.   INDICATIONS:  SDH interval scan to monitor  TECHNIQUE:  Noncontrast CT scanning is performed through the brain. Dose reduction techniques were used. Dose information is transmitted to the ACR (American College of Radiology) NRDR (National Radiology Data Registry) which includes the Dose Index Registry.  PATIENT STATED HISTORY: (As transcribed by Technologist)  Patient is here for interval scan to monitor SDH.    FINDINGS:  Ventricles and sulci are within normal limits.  Trace age indeterminate small vessel ischemic disease.  There is no midline shift or mass-effect.  The basal cisterns are patent.  The gray-white matter differentiation is intact.  There is a 1 mm thick subdural hemorrhage at the anterior falx, improved since 1/22/2024.  No new region of intracranial hemorrhage is identified.  There is no evident fracture.  The visualized paranasal sinuses and mastoid air cells are unremarkable.            CONCLUSION:   1. There is a 1 mm thick subdural hemorrhage at the anterior falx, improved since 1/22/2024.  No new region of intracranial hemorrhage is identified.  2. Trace age indeterminate small vessel ischemic disease. If there is clinical concern for acute ischemia/infarction, an MRI of the brain would be recommended for further evaluation.   LOCATION:  [facility]   Dictated by (CST): Stromberg, LeRoy, MD on 1/23/2024 at 9:00 AM     Finalized by (CST): Stromberg, LeRoy, MD on 1/23/2024 at 9:04 AM       XR KNEE (1 OR 2 VIEWS), LEFT (CPT=73560)    Result Date: 1/22/2024  PROCEDURE:  XR KNEE (1 OR 2 VIEWS), LEFT (CPT=73560)  COMPARISON:  [facility] , XR, XR KNEE (1 OR 2 VIEWS), LEFT (CPT=73560), 12/01/2022, 2:35 PM.  INDICATIONS:  L knee pain after trip and fall- lac to posterior scalp. no loc or vomiting. no thinners. bleeding controlled  PATIENT STATED HISTORY: (As transcribed by Technologist)  Pt. with  left  knee pain after trip and fall- lac to posterior scalp.    FINDINGS:  There is anterior knee soft tissue  swelling.  No acute displaced osseous fracture is identified.  No hardware fracture is evident.  No significant joint effusion.             CONCLUSION:  See above.   LOCATION:  [id number]   Dictated by (CST): Stromberg, LeRoy, MD on 1/22/2024 at 5:11 PM     Finalized by (CST): Stromberg, LeRoy, MD on 1/22/2024 at 5:12 PM       CT SPINE CERVICAL (CPT=72125)    Result Date: 1/22/2024  PROCEDURE:  CT SPINE CERVICAL (CPT=72125)  COMPARISON:  None.  INDICATIONS:  neck pain after fall with posterior head injury  TECHNIQUE:  Noncontrast CT scanning of the cervical spine is performed from the skull base through C7.  Multiplanar reconstructions are generated.  Dose reduction techniques were used. Dose information is transmitted to the ACR (American College of Radiology) NRDR (National Radiology Data Registry) which includes the Dose Index Registry.  PATIENT STATED HISTORY: (As transcribed by Technologist)  Pt states about 12pm she was leaning over putting her pants on when she lost her balance and fell straight back hitting her head on a tile floor. Pt denies LOC, N/V. Aprrox 1 inch abrasion noted to back of scalp. Bleeding controlled at this time. Pt denies blood thinner use.     FINDINGS:  CRANIOCERVICAL AREA:  Normal foramen magnum with no Chiari malformation. PARASPINAL AREA:  Normal with no visible mass.  BONES:  No fracture, pars defect, or osseous lesion.  CERVICAL DISC LEVELS: C2-C3:  No significant disc/facet abnormality, spinal stenosis, or foraminal stenosis. C3-C4:  Left greater than right facet joint hypertrophy results in severe left neural foraminal stenosis.  The right neural foramina and spinal canal are patent C4-C5:  No significant disc/facet abnormality, spinal stenosis, or foraminal stenosis. C5-C6:  Left greater than right facet joint hypertrophy results in mild-to-moderate bilateral neural foraminal stenosis.  The spinal canal is patent C6-C7:  Facet and uncovertebral joint hypertrophy results in  mild-to-moderate bilateral neural foraminal stenosis.  Spinal canal is patent C7-T1:  No significant disc/facet abnormality, spinal stenosis, or foraminal stenosis.            CONCLUSION:  Multilevel degenerative changes.  No acute fracture in the cervical spine.    LOCATION:  [id number]   Dictated by (CST): Duke Mandujano MD on 1/22/2024 at 3:49 PM     Finalized by (CST): Duke Mandujano MD on 1/22/2024 at 3:52 PM       CT BRAIN OR HEAD ([id number])    Result Date: 1/22/2024  PROCEDURE:  CT BRAIN OR HEAD ([id number])  COMPARISON:  [facility] , CT, CT BRAIN OR HEAD ([id number]), 8/28/2018, 0:35 AM.  INDICATIONS:  trip and fall- lac to posterior scalp. no loc or vomiting. no thinners. bleeding controlled.  Head trauma.  TECHNIQUE:  Noncontrast CT scanning is performed through the brain. Dose reduction techniques were used. Dose information is transmitted to the ACR (American College of Radiology) NRDR (National Radiology Data Registry) which includes the Dose Index Registry.  PATIENT STATED HISTORY: (As transcribed by Technologist)  Pt states about 12pm she was leaning over putting her pants on when she lost her balance and fell straight back hitting her head on a tile floor. Pt denies LOC, N/V. Aprrox 1 inch abrasion noted to back of scalp. Bleeding controlled at this time. Pt denies blood thinner use.     FINDINGS:  Subtle parafalcine subdural hematoma is noted measuring up to 2 millimeters in thickness.  No significant mass effect is noted.  There is no midline shift.  No abnormal extra-axial collection is identified.  No large vascular distribution infarction is noted.  No intraparenchymal hemorrhage is identified.  No hydrocephalus.  Basal cisterns are patent.  No acute fracture is identified.  Mastoid air cells and paranasal sinuses are clear.            CONCLUSION:  1. Subdural hematoma along the falx  This report was communicated by telephone to Dr. Aguilar at the dictation time shown below.   LOCATION:  [id number]   Dictated by (CST):  Duke Mandujano MD on 1/22/2024 at 3:44 PM     Finalized by (CST): Duke Mandujano MD on 1/22/2024 at 3:48 PM          ASSESSMENT / PLAN:     Patient is a 63 year old female with PMH sig for htn, hld, dm2, gerd, recent fall and sdh, here for confusion and repeat falls    Impression    -confusion   -recent traumatic SDH from fall (resolved)  -repeated falls    -HTN  -HLD  -DM 2  -GERD  -thyroid nodule seen on CT neck - outpt US thyroid rec, dw pt / daughter  -L rotator cuff tear - plan for surgery later this month    Plan    *confusion / repeated falls  -unclear etiology at this time  -recent SDH but this has reoslved on repeat imaging.   -no obvious infectious etiology. Possible infiltrate on CXR but she has no wbc, fevers, cough, pct neg. Repeat labs. Will get 2v CXR.  No urinary symptoms  -labs overall unremakrable. Check b12 / tsh.   -check orthostatics  -she has had lack of sleep - could be contributing to confusion. Add restoril for tonight  -? Seizure - defer eeg ot neuro    *chronic conditions  Home meds as able    Scds      Further recommendations pending patient's clinical course. [facility] hospitalist to continue to follow patient while in house    Patient and/or patient's family given opportunity to ask questions and note understanding and agreeing with therapeutic plan as outlined    Helio Cotton Hospitalist  [phone]  Answering Service: [phone]

## 2024-02-17 NOTE — PROGRESS NOTES
02/17/24 0858 02/17/24 0900 02/17/24 0902   Vitals   Temp 97.5 °F (36.4 °C) 97.5 °F (36.4 °C) 97.5 °F (36.4 °C)   Temp src Oral Oral Oral   Pulse 72 81 89   Heart Rate Source Monitor Monitor Monitor   Resp 18 18 18   /73 128/64 120/74   MAP (mmHg) 90 83 88   BP Location Left arm Left arm Left arm   BP Method Automatic Automatic Automatic   Patient Position Lying Sitting Standing   Cardiac Rhythm  --  NSR  --

## 2024-02-17 NOTE — PHYSICAL THERAPY NOTE
PT orders received. Chart reviewed. Pt currently off floor for testing at this time. Will hold on initiation of therapy services. RN aware.

## 2024-02-18 ENCOUNTER — APPOINTMENT (OUTPATIENT)
Dept: MRI IMAGING | Facility: HOSPITAL | Age: 64
End: 2024-02-18
Attending: Other
Payer: MEDICARE

## 2024-02-18 PROCEDURE — 70551 MRI BRAIN STEM W/O DYE: CPT | Performed by: OTHER

## 2024-02-18 PROCEDURE — 99233 SBSQ HOSP IP/OBS HIGH 50: CPT | Performed by: OTHER

## 2024-02-18 RX ORDER — CYANOCOBALAMIN 1000 UG/ML
1000 INJECTION, SOLUTION INTRAMUSCULAR; SUBCUTANEOUS DAILY
Status: DISCONTINUED | OUTPATIENT
Start: 2024-02-19 | End: 2024-02-19

## 2024-02-18 RX ORDER — ALPRAZOLAM 0.5 MG/1
1 TABLET ORAL ONCE
Status: COMPLETED | OUTPATIENT
Start: 2024-02-18 | End: 2024-02-18

## 2024-02-18 RX ORDER — MELATONIN
500 DAILY
Status: SHIPPED | COMMUNITY
Start: 2024-02-18

## 2024-02-18 RX ORDER — CYANOCOBALAMIN 1000 UG/ML
1000 INJECTION, SOLUTION INTRAMUSCULAR; SUBCUTANEOUS ONCE
Status: COMPLETED | OUTPATIENT
Start: 2024-02-18 | End: 2024-02-18

## 2024-02-18 NOTE — PLAN OF CARE
Assumed pt care at 1930  Pt is A&Ox3-4, following commands.   Pt on room air, VSS.  NSR  Pt denies pain.  Pt one person assist.  Pt on cardiac diet. Tolerating diet.   R AC saline locked  All night time meds given  Family at bedside  Tramadol given for shoulder pain X1  Bed in lowest position, call light in reach.     Problem: SAFETY ADULT - FALL  Goal: Free from fall injury  Description: INTERVENTIONS:  - Assess pt frequently for physical needs  - Identify cognitive and physical deficits and behaviors that affect risk of falls.  - [place] fall precautions as indicated by assessment.  - Educate pt/family on patient safety including physical limitations  - Instruct pt to call for assistance with activity based on assessment  - Modify environment to reduce risk of injury  - Provide assistive devices as appropriate  - Consider OT/PT consult to assist with strengthening/mobility  - Encourage toileting schedule  Outcome: Progressing     Problem: NEUROLOGICAL - ADULT  Goal: Achieves stable or improved neurological status  Description: INTERVENTIONS  - Assess for and report changes in neurological status  - Initiate measures to prevent increased intracranial pressure  - Maintain blood pressure and fluid volume within ordered parameters to optimize cerebral perfusion and minimize risk of hemorrhage  - Monitor temperature, glucose, and sodium. Initiate appropriate interventions as ordered  Outcome: Progressing  Goal: Achieves maximal functionality and self care  Description: INTERVENTIONS  - Monitor swallowing and airway patency with patient fatigue and changes in neurological status  - Encourage and assist patient to increase activity and self care with guidance from PT/OT  - Encourage visually impaired, hearing impaired and aphasic patients to use assistive/communication devices  Outcome: Progressing     Problem: SAFETY ADULT - FALL  Goal: Free from fall injury  Description: INTERVENTIONS:  - Assess pt frequently for  physical needs  - Identify cognitive and physical deficits and behaviors that affect risk of falls.  - [place] fall precautions as indicated by assessment.  - Educate pt/family on patient safety including physical limitations  - Instruct pt to call for assistance with activity based on assessment  - Modify environment to reduce risk of injury  - Provide assistive devices as appropriate  - Consider OT/PT consult to assist with strengthening/mobility  - Encourage toileting schedule  Outcome: Progressing     Problem: NEUROLOGICAL - ADULT  Goal: Achieves stable or improved neurological status  Description: INTERVENTIONS  - Assess for and report changes in neurological status  - Initiate measures to prevent increased intracranial pressure  - Maintain blood pressure and fluid volume within ordered parameters to optimize cerebral perfusion and minimize risk of hemorrhage  - Monitor temperature, glucose, and sodium. Initiate appropriate interventions as ordered  Outcome: Progressing  Goal: Achieves maximal functionality and self care  Description: INTERVENTIONS  - Monitor swallowing and airway patency with patient fatigue and changes in neurological status  - Encourage and assist patient to increase activity and self care with guidance from PT/OT  - Encourage visually impaired, hearing impaired and aphasic patients to use assistive/communication devices  Outcome: Progressing     Problem: SAFETY ADULT - FALL  Goal: Free from fall injury  Description: INTERVENTIONS:  - Assess pt frequently for physical needs  - Identify cognitive and physical deficits and behaviors that affect risk of falls.  - [place] fall precautions as indicated by assessment.  - Educate pt/family on patient safety including physical limitations  - Instruct pt to call for assistance with activity based on assessment  - Modify environment to reduce risk of injury  - Provide assistive devices as appropriate  - Consider OT/PT consult to assist with  strengthening/mobility  - Encourage toileting schedule  Outcome: Progressing     Problem: NEUROLOGICAL - ADULT  Goal: Achieves stable or improved neurological status  Description: INTERVENTIONS  - Assess for and report changes in neurological status  - Initiate measures to prevent increased intracranial pressure  - Maintain blood pressure and fluid volume within ordered parameters to optimize cerebral perfusion and minimize risk of hemorrhage  - Monitor temperature, glucose, and sodium. Initiate appropriate interventions as ordered  Outcome: Progressing  Goal: Achieves maximal functionality and self care  Description: INTERVENTIONS  - Monitor swallowing and airway patency with patient fatigue and changes in neurological status  - Encourage and assist patient to increase activity and self care with guidance from PT/OT  - Encourage visually impaired, hearing impaired and aphasic patients to use assistive/communication devices  Outcome: Progressing     Problem: SAFETY ADULT - FALL  Goal: Free from fall injury  Description: INTERVENTIONS:  - Assess pt frequently for physical needs  - Identify cognitive and physical deficits and behaviors that affect risk of falls.  - [place] fall precautions as indicated by assessment.  - Educate pt/family on patient safety including physical limitations  - Instruct pt to call for assistance with activity based on assessment  - Modify environment to reduce risk of injury  - Provide assistive devices as appropriate  - Consider OT/PT consult to assist with strengthening/mobility  - Encourage toileting schedule  Outcome: Progressing     Problem: NEUROLOGICAL - ADULT  Goal: Achieves stable or improved neurological status  Description: INTERVENTIONS  - Assess for and report changes in neurological status  - Initiate measures to prevent increased intracranial pressure  - Maintain blood pressure and fluid volume within ordered parameters to optimize cerebral perfusion and minimize risk of  hemorrhage  - Monitor temperature, glucose, and sodium. Initiate appropriate interventions as ordered  Outcome: Progressing  Goal: Achieves maximal functionality and self care  Description: INTERVENTIONS  - Monitor swallowing and airway patency with patient fatigue and changes in neurological status  - Encourage and assist patient to increase activity and self care with guidance from PT/OT  - Encourage visually impaired, hearing impaired and aphasic patients to use assistive/communication devices  Outcome: Progressing     Problem: SAFETY ADULT - FALL  Goal: Free from fall injury  Description: INTERVENTIONS:  - Assess pt frequently for physical needs  - Identify cognitive and physical deficits and behaviors that affect risk of falls.  - [place] fall precautions as indicated by assessment.  - Educate pt/family on patient safety including physical limitations  - Instruct pt to call for assistance with activity based on assessment  - Modify environment to reduce risk of injury  - Provide assistive devices as appropriate  - Consider OT/PT consult to assist with strengthening/mobility  - Encourage toileting schedule  Outcome: Progressing     Problem: NEUROLOGICAL - ADULT  Goal: Achieves stable or improved neurological status  Description: INTERVENTIONS  - Assess for and report changes in neurological status  - Initiate measures to prevent increased intracranial pressure  - Maintain blood pressure and fluid volume within ordered parameters to optimize cerebral perfusion and minimize risk of hemorrhage  - Monitor temperature, glucose, and sodium. Initiate appropriate interventions as ordered  Outcome: Progressing  Goal: Achieves maximal functionality and self care  Description: INTERVENTIONS  - Monitor swallowing and airway patency with patient fatigue and changes in neurological status  - Encourage and assist patient to increase activity and self care with guidance from PT/OT  - Encourage visually impaired, hearing  impaired and aphasic patients to use assistive/communication devices  Outcome: Progressing

## 2024-02-18 NOTE — PROGRESS NOTES
CC: follow-up hospital admission falls    SUBJECTIVE:  Interval History:     Feels better, gait has improved  Confusin is better  Still cannot sleep well at night  Has anxiety     OBJECTIVE:  Scheduled Meds:    fluticasone propionate  1 spray Each Nare BID    ALPRAZolam  0.5 mg Oral Once    ARIPiprazole  5 mg Oral Nightly    atorvastatin  40 mg Oral Nightly    lisinopril  40 mg Oral Daily    busPIRone  10 mg Oral BID    fenofibrate micronized  134 mg Oral Daily with breakfast    metoprolol succinate ER  25 mg Oral Nightly    pregabalin  50 mg Oral TID    traZODone  150 mg Oral Nightly    venlafaxine ER  225 mg Oral Daily     Continuous Infusions:   PRN Meds: temazepam, ALPRAZolam, hydrOXYzine, traMADol, acetaminophen, ondansetron    PHYSICAL EXAM  Vital signs: Temp:  [97.7 °F (36.5 °C)-98.3 °F (36.8 °C)] 98.3 °F (36.8 °C)  Pulse:  [61-80] 66  Resp:  [17-18] 17  BP: (129-152)/(60-82) 129/60  SpO2:  [92 %] 92 %      GENERAL - NAD, AAO  EYES- sclera anicteric,   HENT- normocephalic, OP - MMM  NECK - no JVD  CV- RRR  RESP - CTAB, normal resp effort  ABDOMEN- soft, NT/ND   EXT- no LE edema, DP pulses 2+ b/l  PSYCH - normal mentation/ normal affect    Data Review:   Labs:   Recent Labs   Lab 02/12/24  2201 02/15/24  1332 02/16/24  1333 02/17/24  1228   WBC 6.7 7.2 7.4 8.4   HGB 12.1 12.5 12.1 13.2   MCV 89.3 88.4 89.5 89.4   .0 321.0 304.0 338.0       Recent Labs   Lab 02/12/24  2201 02/15/24  1332 02/16/24  1333 02/17/24  1228    141 141 143   K 3.5 4.0 3.7 3.9    110 112 110   CO2 28.0 27.0 26.0 28.0   BUN 20 20 18 20   CREATSERUM 0.84 0.94 0.90 0.95   CA 9.1 9.2 9.4 9.8   * 88 115* 108*       Recent Labs   Lab 02/12/24  2201 02/15/24  1332 02/16/24  1333   ALT 19 18 17   AST 10* 22 17   ALB 3.4 3.7 3.5       No results for input(s): \"PGLU\" in the last 168 hours.        ASSESSMENT/PLAN:    Patient is a 63 year old female with PMH sig for htn, hld, dm2, gerd, recent fall and sdh, here for  confusion and repeat falls     Impression     -confusion   -recent traumatic SDH from fall (resolved)  -repeated falls     -HTN  -HLD  -DM 2  -GERD  -thyroid nodule seen on CT neck - outpt US thyroid rec, dw pt / daughter  -L rotator cuff tear - plan for surgery later this month     Plan     *confusion / repeated falls  -unclear etiology at this time  -recent SDH but this has reoslved on repeat imaging.   -no obvious infectious etiology. Possible infiltrate on CXR but she has no wbc, fevers, cough, pct neg. Repeat labs stable. Will get 2v CXR - atelectasis vs infiltrate. Suspect former  No urinary symptoms  -labs overall unremakrable. Check b12 / tsh. - b12 low normal, will given IM supplement, tsh normal  -check orthostatics  -she has had lack of sleep - could be contributing to confusion. Increase home xanax to 0.75. already on hydroxyzine and trazadone   -mri pending     *chronic conditions  Home meds as able     Scds             Will continue to follow while hospitalized. Please page me or the on-call hospitalist with questions or concerns.    Helio Cotton Hospitalist  [phone]  Answering Service: [phone]

## 2024-02-18 NOTE — CM/SW NOTE
02/18/24 1300   CM/SW Referral Data   Referral Source Physician   Reason for Referral Discharge planning   Informant Patient;Daughter   Choice of Post-Acute Provider   Informed patient of right to choose their preferred provider Yes   List of appropriate post-acute services provided to patient/family with quality data Yes   Patient/family choice Residential   Information given to Patient   [facility]/Hospice financial disclosure given Yes     Sw met with pt and her 2 daughters.  PT recs HHC - referral was sent in aidin.  [facility]idential to follow.    Caroline Ornelas LCSW  /Discharge Planner

## 2024-02-18 NOTE — HOME CARE LIAISON
Referral received from SW/CM team via [employer]. Discussed with patient's dtr Portia the recommendation for home health services, dtr agreeable, and all questions answered. Updated MEMO Velazquez.

## 2024-02-18 NOTE — PROGRESS NOTES
Dr. Villasenor called for consult went to answering machine on Doctor's phone, message and callback number left.

## 2024-02-18 NOTE — PHYSICAL THERAPY NOTE
PHYSICAL THERAPY TREATMENT NOTE - INPATIENT    Room Number: 3618/3618-A     Session: 1     Number of Visits to Meet Established Goals: 3    Presenting Problem: dizziness causing fall  Co-Morbidities : htn, hld, dm2, gerd, recent fall and sdh    ASSESSMENT   Patient demonstrates good  progress this session, goals  remain in progress.    Patient continues to function near baseline with bed mobility, transfers, gait, and stair negotiation.  Contributing factors to remaining limitations include decreased functional strength and impaired standing balance.  Next session anticipate patient to progress bed mobility, transfers, gait, and stair negotiation.  Physical Therapy will continue to follow patient for duration of hospitalization.    Patient continues to benefit from continued skilled PT services: at discharge to promote functional independence in home.  Anticipate patient will return home with home health PT.    PLAN  PT Treatment Plan: Bed mobility;Body mechanics;Coordination;Endurance;Energy conservation;Patient education;Family education;Gait training;Neuromuscular re-educate;Range of motion;Strengthening;Stoop training;Stair training;Transfer training;Balance training  Rehab Potential : Good  Frequency (Obs): 3-5x/week    CURRENT GOALS     Goal #1 Patient is able to demonstrate supine - sit EOB @ level: independent      Goal #2 Patient is able to demonstrate transfers Sit to/from Stand at assistance level: modified independent      Goal #3 Patient is able to ambulate 50 feet with assist device: walker - rolling at assistance level: supervision      Goal #4 Pt able to ambulate 150 feet w/ RW at supervision.   Goal #5 Pt able to ascend/descend flight of stairs at supervision.   Goal #6 Complete short form bernal balance assessment.    Goal Comments: Goals established on 2/17/2024     PHYSICAL THERAPY MEDICAL/SOCIAL HISTORY  History related to current admission: Patient is a 63 year old female admitted on 2/16/2024  from home for dizziness causing fall.  Workup still ongoing at this point, MRI pending, CTA Brain (-) for any acute processes.     2024 all goals ongoing    SUBJECTIVE  \"I'd like to do the stairs.\"    OBJECTIVE  Precautions: Bed/chair alarm    WEIGHT BEARING RESTRICTION  Weight Bearing Restriction: None                PAIN ASSESSMENT   Ratin  Location: L shoulder  Management Techniques: Repositioning;Activity promotion    BALANCE                                                                                                                       Static Sitting: Good  Dynamic Sitting: Good           Static Standing: Fair + (with RW)  Dynamic Standing: Fair (with RW)    ACTIVITY TOLERANCE  Pulse: 76  Heart Rate Source: Monitor     BP: 129/60             O2 WALK         AM-PAC '6-Clicks' INPATIENT SHORT FORM - BASIC MOBILITY  How much difficulty does the patient currently have...  Patient Difficulty: Turning over in bed (including adjusting bedclothes, sheets and blankets)?: None   Patient Difficulty: Sitting down on and standing up from a chair with arms (e.g., wheelchair, bedside commode, etc.): None   Patient Difficulty: Moving from lying on back to sitting on the side of the bed?: None   How much help from another person does the patient currently need...   Help from Another: Moving to and from a bed to a chair (including a wheelchair)?: A Little   Help from Another: Need to walk in hospital room?: A Little   Help from Another: Climbing 3-5 steps with a railing?: A Little       AM-PAC Score:  Raw Score: 21   Approx Degree of Impairment: 28.97%   Standardized Score (AM-PAC Scale): 50.25   CMS Modifier (G-Code): CJ    FUNCTIONAL ABILITY STATUS  Gait Assessment   Functional Mobility/Gait Assessment  Gait Assistance: Supervision  Distance (ft): 150  Assistive Device: Rolling walker  Pattern: Shuffle  Stairs: Stairs  How Many Stairs: 1 flight  Device: 1 Rail  Assist: Supervision  Pattern: Ascend and  Descend  Ascend and Descend : Step to    Skilled Therapy Provided    Bed Mobility:  Rolling: supervision   Supine<>Sit: supervision   Sit<>Supine: not tested     Transfer Mobility:  Sit<>Stand: supervision   Stand<>Sit: supervision   Gait: pt amb x 150 feet with RW with supervision    Therapist's Comments: per RN pt ok to be seen. Pt received lying in bed with family in room. Pt denies dizziness throughout session, vitals monitored and stable. Pt reports chronic pain L shoulder and pain/weakness L knee.   Pt amb in hallway with RW with supervision and symptom monitoring. Pt able to ascend/descend 1flight of stairs with supervision. Pt returns to room with RW and seated bedside chair. Pt fatigued and required seated break prior to instruction for B LE HEP in sitting. Pt able to return good demonstration. Pt left sitting in bedside chair, RN attending to pt, family in room and questions answered.      THERAPEUTIC EXERCISES  Lower Extremity Alternating marching  Ankle pumps  Hip AB/AD  LAQ     Upper Extremity N/a     Position Sitting     Repetitions   10   Sets   2     Patient End of Session: Up in chair;Needs met;Call light within reach;RN aware of session/findings;All patient questions and concerns addressed;Family present    PT Session Time: 30 minutes  Gait Training: 15 minutes    Therapeutic Exercise: 15 minutes

## 2024-02-19 ENCOUNTER — NURSE ONLY (OUTPATIENT)
Dept: ELECTROPHYSIOLOGY | Facility: HOSPITAL | Age: 64
End: 2024-02-19
Attending: Other
Payer: MEDICARE

## 2024-02-19 VITALS
HEIGHT: 69 IN | HEART RATE: 70 BPM | DIASTOLIC BLOOD PRESSURE: 75 MMHG | SYSTOLIC BLOOD PRESSURE: 152 MMHG | TEMPERATURE: 98 F | RESPIRATION RATE: 18 BRPM | OXYGEN SATURATION: 96 % | BODY MASS INDEX: 33.77 KG/M2 | WEIGHT: 228 LBS

## 2024-02-19 PROCEDURE — 99233 SBSQ HOSP IP/OBS HIGH 50: CPT | Performed by: OTHER

## 2024-02-19 PROCEDURE — 95816 EEG AWAKE AND DROWSY: CPT | Performed by: OTHER

## 2024-02-19 NOTE — PLAN OF CARE
63 year old female a/o x 4. MRI  today negative for CVA. Neuro consulted neuropsych. Neuropsych did not respond to consult but hopefully will see tomorrow. Neuro ordered B12 IM shot daily. Continue plan of care.   Problem: SAFETY ADULT - FALL  Goal: Free from fall injury  Description: INTERVENTIONS:  - Assess pt frequently for physical needs  - Identify cognitive and physical deficits and behaviors that affect risk of falls.  - [place] fall precautions as indicated by assessment.  - Educate pt/family on patient safety including physical limitations  - Instruct pt to call for assistance with activity based on assessment  - Modify environment to reduce risk of injury  - Provide assistive devices as appropriate  - Consider OT/PT consult to assist with strengthening/mobility  - Encourage toileting schedule  Outcome: Progressing

## 2024-02-19 NOTE — PLAN OF CARE
Assumed care at 0730  A/O x 4  RA  NSR on tele / SB when sleeping  Up with assistance  Regular diet no accuchecks  Vit B12 shots daily   Denies pain. Has chronic pain in L shoulder but states its stable for now.   All needs met. Awaiting decision on neuropsych testing. Daughter, Glendy, and pt updated. Will continue with plan of care.    1045: this RN called Dr. Villasenor's office which informed me that Dr. Villasenor will plan to see patient while here this afternoon.    Problem: SAFETY ADULT - FALL  Goal: Free from fall injury  Description: INTERVENTIONS:  - Assess pt frequently for physical needs  - Identify cognitive and physical deficits and behaviors that affect risk of falls.  - [place] fall precautions as indicated by assessment.  - Educate pt/family on patient safety including physical limitations  - Instruct pt to call for assistance with activity based on assessment  - Modify environment to reduce risk of injury  - Provide assistive devices as appropriate  - Consider OT/PT consult to assist with strengthening/mobility  - Encourage toileting schedule  Outcome: Progressing     Problem: NEUROLOGICAL - ADULT  Goal: Achieves stable or improved neurological status  Description: INTERVENTIONS  - Assess for and report changes in neurological status  - Initiate measures to prevent increased intracranial pressure  - Maintain blood pressure and fluid volume within ordered parameters to optimize cerebral perfusion and minimize risk of hemorrhage  - Monitor temperature, glucose, and sodium. Initiate appropriate interventions as ordered  Outcome: Progressing  Goal: Achieves maximal functionality and self care  Description: INTERVENTIONS  - Monitor swallowing and airway patency with patient fatigue and changes in neurological status  - Encourage and assist patient to increase activity and self care with guidance from PT/OT  - Encourage visually impaired, hearing impaired and aphasic patients to use  assistive/communication devices  Outcome: Progressing

## 2024-02-19 NOTE — PLAN OF CARE
End of shift note:  Assumed care of pt 2/18 at 1930. Pt A&Ox4, C/O chronic left arm pain. Pain meds given per mar and pt reported adequate pain relief. On tele, SR/SB. Room air-no reported SOB. SBA to the bathroom. Fall precautions in place. Pt to see Dr. Villasenor 2/19-no reported or observed hallucinations overnight. Pt updated on POC, all questions answered.     Problem: SAFETY ADULT - FALL  Goal: Free from fall injury  Description: INTERVENTIONS:  - Assess pt frequently for physical needs  - Identify cognitive and physical deficits and behaviors that affect risk of falls.  - [place] fall precautions as indicated by assessment.  - Educate pt/family on patient safety including physical limitations  - Instruct pt to call for assistance with activity based on assessment  - Modify environment to reduce risk of injury  - Provide assistive devices as appropriate  - Consider OT/PT consult to assist with strengthening/mobility  - Encourage toileting schedule  Outcome: Progressing     Problem: NEUROLOGICAL - ADULT  Goal: Achieves stable or improved neurological status  Description: INTERVENTIONS  - Assess for and report changes in neurological status  - Initiate measures to prevent increased intracranial pressure  - Maintain blood pressure and fluid volume within ordered parameters to optimize cerebral perfusion and minimize risk of hemorrhage  - Monitor temperature, glucose, and sodium. Initiate appropriate interventions as ordered  Outcome: Progressing  Goal: Achieves maximal functionality and self care  Description: INTERVENTIONS  - Monitor swallowing and airway patency with patient fatigue and changes in neurological status  - Encourage and assist patient to increase activity and self care with guidance from PT/OT  - Encourage visually impaired, hearing impaired and aphasic patients to use assistive/communication devices  Outcome: Progressing     Problem: NEUROLOGICAL - ADULT  Goal: Achieves stable or improved  neurological status  Description: INTERVENTIONS  - Assess for and report changes in neurological status  - Initiate measures to prevent increased intracranial pressure  - Maintain blood pressure and fluid volume within ordered parameters to optimize cerebral perfusion and minimize risk of hemorrhage  - Monitor temperature, glucose, and sodium. Initiate appropriate interventions as ordered  Outcome: Progressing  Goal: Achieves maximal functionality and self care  Description: INTERVENTIONS  - Monitor swallowing and airway patency with patient fatigue and changes in neurological status  - Encourage and assist patient to increase activity and self care with guidance from PT/OT  - Encourage visually impaired, hearing impaired and aphasic patients to use assistive/communication devices  Outcome: Progressing     Problem: NEUROLOGICAL - ADULT  Goal: Achieves stable or improved neurological status  Description: INTERVENTIONS  - Assess for and report changes in neurological status  - Initiate measures to prevent increased intracranial pressure  - Maintain blood pressure and fluid volume within ordered parameters to optimize cerebral perfusion and minimize risk of hemorrhage  - Monitor temperature, glucose, and sodium. Initiate appropriate interventions as ordered  Outcome: Progressing  Goal: Achieves maximal functionality and self care  Description: INTERVENTIONS  - Monitor swallowing and airway patency with patient fatigue and changes in neurological status  - Encourage and assist patient to increase activity and self care with guidance from PT/OT  - Encourage visually impaired, hearing impaired and aphasic patients to use assistive/communication devices  Outcome: Progressing

## 2024-02-19 NOTE — PROCEDURES
EEG REPORT;    Reason for Examination: Encephalopathy    Technical Summary:   18 Channels of EEG and 1 Channel of EKG was performed utilizing internation 10/20 method.        Background Activity:   The background activity consisted of 9-10 Hz waveforms, reactive to eye opening/ external stimulation.    Activation:    Hyperventilation:   Not Performed.    Photic Stimulation:  Driving response seen.No       Sleep:  Stage I sleep seen.     Impression:  This is a Normal EEG. No focal, lateralized or generalized epileptiform activity seen.       Salomón Tompkins MD  [facility].

## 2024-02-19 NOTE — PROGRESS NOTES
[facility]  [facility] Neurology Progress Note    Shalonda Batista Patient Status:  Inpatient    1960 MRN [id number]   Location [facility] 3NE-A Attending Som Simpson DO   Hosp Day # 3 PCP Coco Du DO       Chief Complaint: AMS, recurrent falls      Subjective:  Shalonda Batista is a(n) 63 year old female with history of recurrent falls, peripheral neuropathy, anxiety/ panic disorder, B 12 deficiency, SDH, and memory loss who presented to the ED with confusion and recurrent falls.       Current complaints: Patient verbalizes that she feels back to herself. She remembers her hallucinations from yesterday and states she may still have some intermittently, but nothing as severe as yesterday morning. She feels nearly at her baseline today.       Objective/Physical Exam:    Vital Signs:  Blood pressure 130/75, pulse 55, temperature 98.2 °F (36.8 °C), temperature source Oral, resp. rate 18, height 69\", weight 228 lb (103.4 kg), SpO2 97%, not currently breastfeeding.    GENERAL:  Patient is a 63 year old female in no acute distress.  PSYCH: Pleasant  HEENT:  Normocephalic, atraumatic  NECK: Symmetrical, atraumatic  LUNGS: Regular rate, no accessory muscle use  HEART: Regular rate and rhythm.  ABD: Soft, non tender  SKIN: Warm, dry, no rashes  EXTREMITIES: Symmetrical     NEUROLOGICAL:   Mental status: Oriented to person, place, time, and situation-recalls her hallucinations  Speech: Fluent, no dysarthria  Memory and comprehension: Intact- forgetful  Cranial Nerves: VFF, PERRL 3mm brisk, EOMI, no nystagmus, facial sensation intact, face symmetric, tongue midline, shoulder shrug equal, remainder CN intact  Motor: No drift, no focal arm or leg weakness   Sensory: Intact to light touch- reports baseline peripheral neuropathy  Coordination: FTN intact  Gait: Deferred    Imaging/Diagnostic:  MRI BRAIN (CPT=70551)    Result Date: 2024  CONCLUSION:  There is no MR evidence for acute intracranial  infarction.  Mild diffuse cerebral and cerebellar atrophy with chronic microvascular ischemic changes of aging.   LOCATION:  [facility]   Dictated by (CST): Shyla Caldwell MD on 2/18/2024 at 1:20 PM     Finalized by (CST): Shyla Caldwell MD on 2/18/2024 at 1:26 PM       XR CHEST PA + LAT CHEST (CPT=71046)    Result Date: 2/17/2024  CONCLUSION:  Small focal airspace opacity within the lateral lingula may represent a focus of pneumonia or atelectasis.   LOCATION:  [facility]   Dictated by (CST): Reji Garner MD on 2/17/2024 at 11:26 AM     Finalized by (CST): Reji Garner MD on 2/17/2024 at 11:27 AM       CTA BRAIN + CTA CAROTIDS (CPT=70496/62920)    Result Date: 2/16/2024  CONCLUSION:  1. No large vessel occlusion, hemodynamically significant stenosis, dissection or aneurysm of the major arterial vasculature of the head or neck.  2. No acute intracranial process.  3. Subcentimeter hypodense nodules of the left thyroid lobe, best assessed with thyroid ultrasound.    LOCATION:  [facility]   Dictated by (CST): Jose Luis Garcia MD on 2/16/2024 at 7:18 PM     Finalized by (CST): Jose Luis Garcia MD on 2/16/2024 at 7:27 PM       XR SHOULDER, COMPLETE (MIN 2 VIEWS), LEFT (CPT=73030)    Result Date: 2/16/2024  CONCLUSION:  There is AC joint hypertrophy with lateral downsloping of the acromion.  Scapular view is rotated limiting evaluation.  No definite shoulder dislocation or acute fracture.  There is high-riding humeral head in relation the glenoid concerning for possible impingement.   LOCATION:  [id number]   Dictated by (CST): Shyla Caldwell MD on 2/16/2024 at 2:51 PM     Finalized by (CST): Shyla Caldwell MD on 2/16/2024 at 2:54 PM       XR CHEST AP PORTABLE  (CPT=71045)    Result Date: 2/16/2024  CONCLUSION:  Focal infiltrate in the left lung base.  Mild increase in the cardiac silhouette pulmonary vascularity upper limits of  Increased interstitial markings throughout unchanged.  Postsurgical changes right shoulder.   LOCATION:  [id number]       Dictated by (CST): Shyla Caldwell MD on 2/16/2024 at 2:49 PM     Finalized by (CST): Shyla Caldwell MD on 2/16/2024 at 2:51 PM       Principal Problem:    Altered mental status, unspecified altered mental status type  Active Problems:    Recurrent falls    Gait abnormality    Memory loss         Assessment/Plan:  Recurrent falls in the setting of arthritis and peripheral neuropathy. AMS potentially in setting of B 12 deficiency?   -MRI brain negative for intracranial abnormalities, cerebral/cerebellar atrophy  -CTA head and neck no ICAD  -B 12 injections daily 1,000 mcg initiated 2/18/24- injection weekly after discharge  -Neuropsych consult pending today  -EMG recommended as outpatient  -Dr. Tompkins to follow with additional recommendations if indicated      NICOLE Rolon  [facility]  2/19/2024  8:21 AM  Spectra [id number]    Is this a shared or split note between Advanced Practice Provider and Physician? Yes       Impression/plan/MDM:  Patient seen and examined personally.  Investigations reviewed.    Encephalopathy complicated by cognitive decline.  Currently stable.  Advise EEG.  Family wanting inpatient neuropsych testing and PET scan.  Advised that we would try and get the neuropsych testing.  If need be PET scan can be scheduled as outpatient preferably after patient is seen by dementia specialist.  Sister understanding of the situation.  Will follow

## 2024-02-19 NOTE — OCCUPATIONAL THERAPY NOTE
OCCUPATIONAL THERAPY EVALUATION - INPATIENT    Room Number: 3618/3618-A  Evaluation Date: 2/19/2024     Type of Evaluation: Initial  Presenting Problem: AMS    Physician Order: IP Consult to Occupational Therapy  Reason for Therapy:  ADL/IADL Dysfunction and Discharge Planning    Patient admitted from home with dizziness and a fall.  MRI brain was negative for an acute event.  Per EMR, the patient takes multiple psychiatric medications and family has reported that patient lopez had hallucinations during this admission. Patient is to have neuropsychological testing as an inpatient.    OCCUPATIONAL THERAPY ASSESSMENT   Patient is a 63 year old female admitted 2/16/2024 for AMS.  Patient is currently functioning at baseline with toileting, lower body dressing, grooming, eating, bed mobility, and transfers.  Prior to admission, patient's baseline is modified independent with basic self care and mobility.  Patient met all OT goals at supervision to independent level.  Patient reports no further questions/concerns at this time.     Per EMR, patient to have neuropsychological testing while admitted. Patient will benefit from frequent mobilization with nursing staff to prevent deconditioning. Patient may benefit from HHOT safety evaluation. DC from occupational therapy. Recommend patient have supervision at home and assistance with medication management.      WEIGHT BEARING RESTRICTION  Weight Bearing Restriction: None                Recommendations for nursing staff:   Transfers: 1 person, RW, gait belt ( requested RN to obtain RW for room )  Toileting location: Toilet    EVALUATION SESSION:  Patient at start of session: seated in recliner, patient's sister present  FUNCTIONAL TRANSFER ASSESSMENT  Sit to Stand: Chair  Chair: Supervision    BED MOBILITY  Sit to Supine (OT): Independent    BALANCE ASSESSMENT  Static Sitting: Independent  Sitting Bilateral: Supervision  Static Standing: Independent  Standing Bilateral:  Supervision    FUNCTIONAL ADL ASSESSMENT  LB Dressing Seated: Independent      ACTIVITY TOLERANCE: stable ; SBP slightly elevated 150           BP:  ()  BP Location: Left arm  BP Method: Automatic  Patient Position: Sitting    O2 SATURATIONS       COGNITION  Arousal/Alertness:  appropriate responses to stimuli  Attention Span:  appears intact  Orientation Level:  oriented x4  Memory:  some errors noted, not formally tested; able to describe why she is here and knew the name of her orthopedic surgeon for upcoming procedure  Following Commands:  follows one step commands without difficulty  Initiation: appears intact  Awareness of Errors:  decreased awareness of errors   COGNITION ASSESSMENTS       Upper Extremity:   ROM: decreased L shoulder AROM; otherwise WFL  Strength: is within functional limits L shoulder NT; otherwise WFL 4+/5   Coordination:  Gross motor: wfl  Fine motor: wfl  Sensation: denied numbness or tingling    EDUCATION PROVIDED  Patient: Role of Occupational Therapy; Plan of Care; Discharge Recommendations  Patient's Response to Education: Verbalized Understanding    Equipment used: rw, gait belt  Demonstrates functional use    Therapist comments: Patient was able to doff and don sock while seated in chair using figure four position. She completed functional mobility with use of RW for approximately 100 feet with good tolerance. Complained of feeling a little shaky near end of walk. HR 72, BP noted to be slightly higher than earlier readings at 150 systolic after activity.     Patient End of Session: All patient questions and concerns addressed;Call light within reach;RN aware of session/findings;Needs met;Family present;In bed    OCCUPATIONAL PROFILE    HOME SITUATION  Type of Home: [facility]  Home Layout: Two level;Bed/bath upstairs  Lives With: Daughter;Family       Shower/Tub and Equipment: Walk-in shower;Grab bar;Shower chair          Hand Dominance: Right  Drives: Other (Comment) (per PT  oral, no driving; patient reported she can drive but has no car right now)  Patient Regularly Uses: Glasses    Prior Level of Function: Lives in a two level townhouse with daughter and grandchildren . Does not drive. Is supposed to have L TSA in two weeks at [facility].    SUBJECTIVE  Pleasant, participatory    PAIN ASSESSMENT  Rating: Other (Comment)  Location: L shoulder  Management Techniques: Repositioning;Relaxation    OBJECTIVE  Precautions: Bed/chair alarm  Fall Risk: Standard fall risk    WEIGHT BEARING RESTRICTION  Weight Bearing Restriction: None                AM-PAC ‘6-Clicks’ Inpatient Daily Activity Short Form  -   Putting on and taking off regular lower body clothing?: A Little  -   Bathing (including washing, rinsing, drying)?: A Little  -   Toileting, which includes using toilet, bedpan or urinal? : A Little  -   Putting on and taking off regular upper body clothing?: None  -   Taking care of personal grooming such as brushing teeth?: None  -   Eating meals?: None    AM-PAC Score:  Score: 21  Approx Degree of Impairment: 32.79%  Standardized Score (AM-PAC Scale): 44.27      ADDITIONAL TESTS     NEUROLOGICAL FINDINGS  Coordination - Finger Opposition: Symmetrical       PLAN   Patient has been evaluated and presents with no skilled Occupational Therapy needs at this time.  Patient discharged from Occupational Therapy services.  Please re-order if a new functional limitation presents during this admission.      Patient Evaluation Complexity Level:   Occupational Profile/Medical History LOW - Brief history including review of medical or therapy records    Specific performance deficits impacting engagement in ADL/IADL LOW  1 - 3 performance deficits    Client Assessment/Performance Deficits MODERATE - Comorbidities and min to mod modifications of tasks    Clinical Decision Making LOW - Analysis of occupational profile, problem-focused assessments, limited treatment options    Overall Complexity LOW      OT Session Time: 20 minutes  Therapeutic Activity: 15 minutes

## 2024-02-19 NOTE — CONSULTS
[facility]  Report of Neuropsychology Consultation    Shalonda Batista Patient Status:  Inpatient    1960 MRN [id number]   Location [facility] 3NE-A Attending Helio Davila DO   Hosp Day # 3 PCP Coco Du DO         History:    Shalonda Batista is a a(n) 63 year old female with PMH as listed below who presents with dizziness and fall. Recently has been having frequent falls. She had been seen on  and was had a small subdural and was admitted for observation.  Patient was seen approximately on  for UTI and then on February 15 she complained of generalized weakness tiredness., At that time she had a CT of the brain and small SDH resolved.  Yesterday at home she says she was walking just turned and fell. She did not pass out. She says she has been intermittently having some dizziness which she describes as lightheadedness, dizziness. She also complained of some times word finding difficulty.  States her knees are feeling weak, has both knee replacement done in  and knees have give out on few occasion leading to fall.   Family at bedside who reports in addition to above they have noticed changes in her memory and cognition. At baseline has chronic depression and on multiple psych meds but no changes in dose recently.  Past Medical History:   Diagnosis Date    Acute subdural hematoma (HCC) 2024    per pt currently resolved    Anxiety state     Back pain     Back problem     Depression     Diabetes (HCC)     High blood pressure     High cholesterol     Hypokalemia     Hypomagnesemia     Insomnia     Neuropathy     Osteoarthritis     Other and unspecified hyperlipidemia     Unspecified essential hypertension     Visual impairment     glasses     Past Surgical History:   Procedure Laterality Date    ANESTH,SHOULDER REPLACEMENT      right    ARTHROTOMY,OPEN REPAIR MENISCUS Left 2020    BACK SURGERY      laminectomy L4-5-S1     CHOLECYSTECTOMY      EXCIS LUMBAR DISK,ONE LEVEL  2015    redo L5-S1 discectomy    HYSTERECTOMY  2004    partial    OTHER      AVM malformation with coil put in place right side of chest     SPINE SURGERY PROCEDURE UNLISTED      TOTAL KNEE REPLACEMENT Bilateral 05/2022    R Knee Replacement     The patient did not indorse personal psychiatric inpatient treatment history  FAMILY HX  Family History   Problem Relation Age of Onset    Cancer Father 79        Lung cancer    Cancer Son     Other (Other) Son         MVA    Cancer Maternal Grandmother         Skin cancer    Other (Other) Maternal Grandfather         TB    Heart Disorder Paternal Grandmother 75        Heart attack    Psychiatric Sister         Depression    Breast Cancer Sister 65        sister at age of 65    Heart Disorder Brother 56        Triple bypass    Hypertension Brother         SOCIAL HISTORY   reports that she quit smoking about 23 years ago. Her smoking use included cigarettes. She has a 10 pack-year smoking history. She has never been exposed to tobacco smoke. She has never used smokeless tobacco. She reports that she does not drink alcohol and does not use drugs. No specific psychiatric history for self or family reported.       Allergies:  Allergies   Allergen Reactions    Oxycodone NAUSEA ONLY, DIZZINESS and FACE FLUSHING     Nausea, dizziness         Medications:    Current Facility-Administered Medications:     ALPRAZolam (Xanax) tab 0.75 mg, 0.75 mg, Oral, Nightly PRN    cyanocobalamin (Vitamin B12) 1000 MCG/ML injection 1,000 mcg, 1,000 mcg, Intramuscular, Daily    fluticasone propionate (Flonase) 50 MCG/ACT nasal suspension 1 spray, 1 spray, Each Nare, BID    ARIPiprazole (Abilify) tab 5 mg, 5 mg, Oral, Nightly    atorvastatin (Lipitor) tab 40 mg, 40 mg, Oral, Nightly    lisinopril (Prinivil; Zestril) tab 40 mg, 40 mg, Oral, Daily    busPIRone (Buspar) tab 10 mg, 10 mg, Oral, BID    fenofibrate micronized (Lofibra) cap 134 mg, 134 mg,  Oral, Daily with breakfast    hydrOXYzine (Atarax) tab 50 mg, 50 mg, Oral, Nightly PRN    metoprolol succinate ER (Toprol XL) 24 hr tab 25 mg, 25 mg, Oral, Nightly    pregabalin (Lyrica) cap 50 mg, 50 mg, Oral, TID    traMADol (Ultram) tab 50 mg, 50 mg, Oral, Q6H PRN    traZODone (Desyrel) tab 150 mg, 150 mg, Oral, Nightly    venlafaxine ER (Effexor-XR) 24 hr cap 225 mg, 225 mg, Oral, Daily    acetaminophen (Tylenol) tab 650 mg, 650 mg, Oral, Q6H PRN    ondansetron (Zofran) 4 MG/2ML injection 4 mg, 4 mg, Intravenous, Q6H PRN      Laboratory Data:       Imaging Impressions  MRI BRAIN (CPT=70551)    Result Date: 2/18/2024  CONCLUSION:  There is no MR evidence for acute intracranial infarction.  Mild diffuse cerebral and cerebellar atrophy with chronic microvascular ischemic changes of aging.   LOCATION:  [facility]   Dictated by (CST): Shyla Caldwell MD on 2/18/2024 at 1:20 PM     Finalized by (CST): Shyla Caldwell MD on 2/18/2024 at 1:26 PM       CTA BRAIN + CTA CAROTIDS (CPT=70496/37242)    Result Date: 2/16/2024  CONCLUSION:  1. No large vessel occlusion, hemodynamically significant stenosis, dissection or aneurysm of the major arterial vasculature of the head or neck.  2. No acute intracranial process.  3. Subcentimeter hypodense nodules of the left thyroid lobe, best assessed with thyroid ultrasound.    LOCATION:  [facility]   Dictated by (CST): Jose Luis Garcia MD on 2/16/2024 at 7:18 PM     Finalized by (CST): Jose Luis Garcia MD on 2/16/2024 at 7:27 PM       CT BRAIN OR HEAD ([id number])    Result Date: 2/15/2024  CONCLUSION:  1. No acute intracranial hemorrhage or hydrocephalus. 2. Trace chronic small vessel ischemic disease. If there is clinical concern for acute ischemia/infarction, an MRI of the brain would be recommended for further evaluation.    LOCATION:  [place]   Dictated by (CST): Stromberg, LeRoy, MD on 2/15/2024 at 3:26 PM     Finalized by (CST): Stromberg, LeRoy, MD on 2/15/2024 at 3:28 PM       CT BRAIN OR HEAD  ([id number])    Result Date: 2/12/2024  CONCLUSION: 1. No acute intracranial findings 2. Chronic microvascular ischemic changes    LOCATION:  [facility]   Dictated by (CST): Jeni Celeste MD on 2/12/2024 at 10:39 PM     Finalized by (CST): Jeni Celeste MD on 2/12/2024 at 10:40 PM       CT BRAIN OR HEAD ([id number])    Result Date: 1/23/2024  CONCLUSION:   1. There is a 1 mm thick subdural hemorrhage at the anterior falx, improved since 1/22/2024.  No new region of intracranial hemorrhage is identified.  2. Trace age indeterminate small vessel ischemic disease. If there is clinical concern for acute ischemia/infarction, an MRI of the brain would be recommended for further evaluation.   LOCATION:  [facility]   Dictated by (CST): Stromberg, LeRoy, MD on 1/23/2024 at 9:00 AM     Finalized by (CST): Stromberg, LeRoy, MD on 1/23/2024 at 9:04 AM       CT BRAIN OR HEAD ([id number])    Result Date: 1/22/2024  CONCLUSION:  1. Subdural hematoma along the falx  This report was communicated by telephone to Dr. Aguilar at the dictation time shown below.   LOCATION:  [id number]   Dictated by (CST): Duke Mandujano MD on 1/22/2024 at 3:44 PM     Finalized by (CST): Duke Mandujano MD on 1/22/2024 at 3:48 PM         Reason for Referral     The patient was referred for a neuropsychological evaluation to help establish a baseline level of cognitive functioning.  The patient's had increased confusion and falls.  When discussing with her Sister Lizbeth it seems as if some of the symptomology has been present for greater than 6 months.  She does report that there has been fluctuation within her cognitive abilities throughout the day.  She recently had a visual hallucination in which she saw her nephew vacuuming her room.  There is no reported significant tremors or Parkinson type symptoms they have noticed some restless leg syndromes.    Overall were looking for clarity for differential diagnosis and treatment planning.    Recommendations    Patient had just began  her EEG.  I was unable to complete a neuropsychological evaluation within the hospital environment.    The family reports they tried to make an outpatient neuropsychological evaluation but they are aware that the wait is several months.  The family agreed to follow-up at my office and we will make emergency compensation and get them into the clinic within the next 10 to 14 days.  They will follow-up with my office tomorrow.    Given there is some variability in cognitive process, qualities of restless leg syndrome, recent falls, and visual hallucinations part of the differential diagnosis will include Lewy body disease.  This was discussed with her Sister Lizbeth.  Additionally we have additional measures within the clinic versus bedside which allows greater sensitivity for understanding cognitive strengths and weaknesses.  Additionally this will allow for even further medical workups and stabilization to ensure appropriate baseline.    Thank you for considering [facility] neuropsychology and providing care for your patients. I do apologize for the overlooked dictation errors as this was completed with [facility] naturally speaking. If there is additional questions please feel free to contact me at [phone].  And family is welcome to contact me to review the results.

## 2024-02-20 NOTE — PROGRESS NOTES
NURSING DISCHARGE NOTE    Discharged Home via Wheelchair.  Accompanied by Family member  Belongings Taken by patient/family.    AVS printed and reviewed with patient and patient's family  PIV removed  Need for follow up reinforced. Pt discharged in stable condition.

## 2024-02-20 NOTE — DISCHARGE SUMMARY
[facility] Discharge Summary    Patient ID  Shalonda Batista  [id number]  63 year old  5/8/1960    Admit date: 2/16/2024    Discharge date: 2/19/2024  6:52 PM      Attending: No att. providers found     Primary Care Physician: Coco Du DO      Reason for admission: falls, confusion    Discharge condition: stable    Disposition: home    Important follow up:  -PCP within 7 d  -specialists:      -labs:    -radiology:      Additional patient instructions       Discharge med list     Medication List        CHANGE how you take these medications      venlafaxine ER 75 MG Cp24  Commonly known as: Effexor-XR  Take 3 capsules (225 mg total) by mouth daily.  What changed: when to take this            CONTINUE taking these medications      ALPRAZolam 0.5 MG Tabs  Commonly known as: Xanax     ARIPiprazole 5 MG Tabs  Commonly known as: Abilify     atorvastatin 40 MG Tabs  Commonly known as: Lipitor  Take 1 tablet (40 mg total) by mouth nightly.     Benazepril HCl 40 MG Tabs  Commonly known as: LOTENSIN     busPIRone 10 MG Tabs  Commonly known as: Buspar     cyanocobalamin 1000 MCG Tabs  Commonly known as: Vitamin B12     Fenofibrate 160 MG Tabs     hydrOXYzine 50 MG Tabs  Commonly known as: Atarax  Take 1 tablet (50 mg total) by mouth nightly as needed (insomnia).     metoprolol succinate ER 25 MG Tb24  Commonly known as: Toprol XL     pregabalin 50 MG Caps  Commonly known as: Lyrica  Take 1 capsule (50 mg total) by mouth 3 (three) times daily.     traMADol 50 MG Tabs  Commonly known as: Ultram     traZODone 150 MG Tabs  Commonly known as: Desyrel  Take 1 tablet (150 mg total) by mouth nightly.            STOP taking these medications      cephalexin 500 MG Caps  Commonly known as: Keflex              Discharge Diagnoses:  -confusion   -recent traumatic SDH from fall (resolved)  -repeated falls     -HTN  -HLD  -DM 2  -GERD  -thyroid nodule seen on CT neck - outpt US thyroid rec, dw pt / daughter  -L rotator cuff tear -  plan for surgery later this month    Consults:  IP CONSULT TO NEUROLOGY  IP CONSULT TO HOSPITALIST  IP CONSULT TO SOCIAL WORK  IP CONSULT TO SOCIAL WORK  IP CONSULT TO NEUROPSYCHOLOGY    Radiology:  MRI BRAIN (CPT=70551)    Result Date: 2/18/2024  PROCEDURE:  MRI BRAIN (CPT=70551)  COMPARISON:  [facility] , CT, CTA BRAIN + CTA CAROTIDS (CPT=70496/87203), 2/16/2024, 6:39 PM.  INDICATIONS:  cognitive impairment and gait balance problem  TECHNIQUE:  MRI of the brain was performed with multi-planar T1, T2-weighted images with FLAIR sequences and diffusion weighted images without infusion.  PATIENT STATED HISTORY: (As transcribed by Technologist)  Patient states she fell 3 weeks ago hitting head. Cognitive impairment and gait balance problem.    FINDINGS:  INTRACRANIAL:  Diffuse cerebral and cerebellar atrophy.  There is no restricted diffusion to suggest acute intracranial infarction.  A few scattered foci of increased FLAIR and T2 signal in the periventricular and subcortical white matter both cerebral hemispheres consistent with mild chronic small vessel ischemic changes of aging.  No midline shift.  No mass effect.  No susceptibility artifact to suggest hemorrhage.  VENTRICLES/SULCI:   No hydrocephalus.  SINUSES/ORBITS:       The visualized paranasal sinuses are clear.  The orbits are unremarkable. MASTOIDS:      The mastoids are clear.            CONCLUSION:  There is no MR evidence for acute intracranial infarction.  Mild diffuse cerebral and cerebellar atrophy with chronic microvascular ischemic changes of aging.   LOCATION:  [facility]   Dictated by (CST): Shyla Caldwell MD on 2/18/2024 at 1:20 PM     Finalized by (CST): Shyla Caldwell MD on 2/18/2024 at 1:26 PM       XR CHEST PA + LAT CHEST (CPT=71046)    Result Date: 2/17/2024  PROCEDURE:  XR CHEST PA + LAT CHEST (CPT=71046)  INDICATIONS:  possible pna on imaging  COMPARISON:  [facility], XR, XR CHEST AP PORTABLE  (CPT=71045), 2/12/2024, 10:43 PM.  [facility] , XR, XR  CHEST AP PORTABLE  (CPT=71045), 2/16/2024, 2:18 PM.  TECHNIQUE:  PA and lateral chest radiographs were obtained.  PATIENT STATED HISTORY: (As transcribed by Technologist)  Patient states chest congestion today. Denies cough.    FINDINGS:  LUNGS:  There is a calcified granuloma within the left lower lobe.  There is coils within the right middle lobe from prior embolization.  The small focal airspace opacity in the lingula is stable may represent an area of atelectasis or pneumonia. CARDIAC:  Normal size cardiac silhouette. MEDIASTINUM:  Normal. PLEURA:  Normal.  No pleural effusions. BONES:  Normal for age.            CONCLUSION:  Small focal airspace opacity within the lateral lingula may represent a focus of pneumonia or atelectasis.   LOCATION:  [facility]   Dictated by (CST): Reji Garner MD on 2/17/2024 at 11:26 AM     Finalized by (CST): Reji Garner MD on 2/17/2024 at 11:27 AM       CTA BRAIN + CTA CAROTIDS (CPT=70496/56786)    Result Date: 2/16/2024  PROCEDURE:  CTA BRAIN + CTA CAROTIDS (CPT=70496/51241)  COMPARISON:  [facility] , CT, CT BRAIN OR HEAD ([id number]), 2/15/2024, 3:14 PM.  INDICATIONS:  fall, landed on left shoulder. fatigue & dizzy. No LOC. not on a blood thinner. seen here yesterday for stroke like symptoms  TECHNIQUE:  CT angiography of the head and neck were obtained with non-ionic contrast.  3D volume rendering images were obtained by the technologist as well as the radiologist on an independent workstation.  Multiplanar 3D reformatted imaging including multiplanar MIP images were obtained.  Curved planar reformats were performed through the carotid and vertebral arteries. All measurements obtained in this exam were performed using NASCET criteria.  Dose reduction techniques were used. Dose information is transmitted to the ACR (American College of Radiology) NRDR (National Radiology Data Registry) which includes the Dose Index Registry.  PATIENT STATED HISTORY:(As transcribed by Technologist)  Patient  fell and landed on left side, altered mental status, fatigue.   CONTRAST USED:  75cc of Isovue 370  FINDINGS:   HEAD:  No intracranial hemorrhage, mass effect, or acute large vessel transcortical infarct.  Diffuse sulcal prominence concordant with age.  Gray-white differentiation is preserved.  No hydrocephalus.  Basilar cisterns are patent.  Minimal intracranial atherosclerosis.  No pathologic parenchymal or meningeal enhancement.  Paranasal sinuses and mastoid air cells are clear.  No depressed calvarial fracture.  No scalp hematoma.  ANGIOGRAM:  NECK:  Patency of the aortic arch and origins of the great vessels.  Patency of both vertebral arteries without significant stenosis.  Right common carotid artery is patent.  Calcifications and soft tissue plaque at the right carotid bifurcation and involving the proximal cervical internal carotid artery without evidence of hemodynamically significant stenosis (less than 50 percent per NASCET criteria).  Right cervical internal carotid artery is patent.  Right external carotid artery is patent.  Left common carotid artery is patent.  Calcifications and soft tissue plaque at the left carotid bifurcation and involving the proximal cervical internal carotid artery without evidence of hemodynamically significant stenosis (less than 50 percent per NASCET criteria).  Left cervical internal carotid artery is patent.  Left external carotid artery is patent.  BRAIN:  There is patency of the anterior circulation including the bilateral intracranial internal carotid arteries, and the A1 and M1 segments of the anterior and middle cerebral arteries.  There is patency of the posterior circulation including the bilateral distal vertebral arteries, basilar artery, and P1 segments of the posterior cerebral arteries.  There is no focal saccular aneurysm.  Please note CTA is less sensitive for aneurysms less than 3 mm in size.  INCIDENTAL:  Multiple subcentimeter hypodense nodules of the  left thyroid lobe noted.             CONCLUSION:  1. No large vessel occlusion, hemodynamically significant stenosis, dissection or aneurysm of the major arterial vasculature of the head or neck.  2. No acute intracranial process.  3. Subcentimeter hypodense nodules of the left thyroid lobe, best assessed with thyroid ultrasound.    LOCATION:  [facility]   Dictated by (CST): Jose Luis Garcia MD on 2/16/2024 at 7:18 PM     Finalized by (CST): Jose Luis Garcia MD on 2/16/2024 at 7:27 PM       XR SHOULDER, COMPLETE (MIN 2 VIEWS), LEFT (CPT=73030)    Result Date: 2/16/2024  PROCEDURE:  XR SHOULDER, COMPLETE (MIN 2 VIEWS), LEFT (CPT=73030)  TECHNIQUE:  Multiple views were obtained.  COMPARISON:  [facility], XR, XR CHEST AP PORTABLE  (CPT=71045), 2/12/2024, 10:43 PM.  [facility] , XR, XR CHEST AP PORTABLE  (CPT=71045), 12/03/2023, 5:37 PM.  [facility], XR, XR SHOULDER, COMPLETE (MIN 2 VIEWS), LEFT (CPT=73030), 7/27/2022, 8:05 PM.  [facility]  , XR, XR CHEST AP PORTABLE  (CPT=71045), 2/16/2024, 2:18 PM.  INDICATIONS:  fall, landed on left shoulder. fatigue & dizzy. No LOC. not on a blood thinner. seen here yesterday for stroke like symptoms  PATIENT STATED HISTORY: (As transcribed by Technologist)  Patient fell and landed on her left shoulder. Patient is due to have left shoulder surgery in two weeks to fix a torn rotator cuff.             CONCLUSION:  There is AC joint hypertrophy with lateral downsloping of the acromion.  Scapular view is rotated limiting evaluation.  No definite shoulder dislocation or acute fracture.  There is high-riding humeral head in relation the glenoid concerning for possible impingement.   LOCATION:  [id number]   Dictated by (CST): Shyla Caldwell MD on 2/16/2024 at 2:51 PM     Finalized by (CST): Shyla Caldwell MD on 2/16/2024 at 2:54 PM       XR CHEST AP PORTABLE  (CPT=71045)    Result Date: 2/16/2024  PROCEDURE:  XR CHEST AP PORTABLE  (CPT=71045)  TECHNIQUE:  AP chest radiograph was obtained.  COMPARISON:  [facility],  XR, XR CHEST AP PORTABLE  (CPT=71045), 2/12/2024, 10:43 PM.  INDICATIONS:  fall, landed on left shoulder. fatigue & dizzy. No LOC. not on a blood thinner. seen here yesterday for stroke like symptoms  PATIENT STATED HISTORY: (As transcribed by Technologist)  Patient fell and landed on her left shoulder.              CONCLUSION:  Focal infiltrate in the left lung base.  Mild increase in the cardiac silhouette pulmonary vascularity upper limits of  Increased interstitial markings throughout unchanged.  Postsurgical changes right shoulder.   LOCATION:  [id number]      Dictated by (CST): Shyla Caldwell MD on 2/16/2024 at 2:49 PM     Finalized by (CST): Shyla Caldwell MD on 2/16/2024 at 2:51 PM       CT BRAIN OR HEAD ([id number])    Result Date: 2/15/2024  PROCEDURE:  CT BRAIN OR HEAD ([id number])  COMPARISON:  [facility], CT, CT BRAIN OR HEAD ([id number]), 2/12/2024, 10:06 PM.  INDICATIONS:  AMS  TECHNIQUE:  Noncontrast CT scanning is performed through the brain. Dose reduction techniques were used. Dose information is transmitted to the ACR (American College of Radiology) NRDR (National Radiology Data Registry) which includes the Dose Index Registry.  PATIENT STATED HISTORY: (As transcribed by Technologist)  Patient with altered mental status.    FINDINGS:  Ventricles and sulci are within normal limits.  Trace chronic small vessel ischemic disease.  There is no midline shift or mass-effect.  The basal cisterns are patent.  The gray-white matter differentiation is intact.  There is no acute intracranial hemorrhage or extra-axial fluid collection.   There is no evident fracture.  The visualized paranasal sinuses and mastoid air cells are unremarkable.            CONCLUSION:  1. No acute intracranial hemorrhage or hydrocephalus. 2. Trace chronic small vessel ischemic disease. If there is clinical concern for acute ischemia/infarction, an MRI of the brain would be recommended for further evaluation.    LOCATION:  [facility]   Dictated by  (CST): Stromberg, LeRoy, MD on 2/15/2024 at 3:26 PM     Finalized by (CST): Stromberg, LeRoy, MD on 2/15/2024 at 3:28 PM       XR CHEST AP PORTABLE  (CPT=71045)    Result Date: 2/12/2024  PROCEDURE:  XR CHEST AP PORTABLE  (CPT=71045)  TECHNIQUE:  AP chest radiograph was obtained.  COMPARISON:  [facility] , XR, XR CHEST AP PORTABLE  (CPT=71045), 12/03/2023, 5:37 PM.  [facility] , XR, XR CHEST AP PORTABLE  (CPT=71045), 6/07/2023, 2:32 PM.  INDICATIONS:  hypertension, last bp 186/110. stomach pain  PATIENT STATED HISTORY: (As transcribed by Technologist)  Pt c/o mid anterior stomach pain since 2/7/24, she denies any other chest complications. Pt has hx of coil in right lung and hx of gallbladder removal.    FINDINGS:  Cardiac size and pulmonary vasculature are within normal limits. No pleural effusions. No pneumothorax.  Calcified granuloma.  Right shoulder arthroplasty.             CONCLUSION:  No acute pulmonary findings.   LOCATION:  [facility]      Dictated by (CST): Jeni Celeste MD on 2/12/2024 at 10:54 PM     Finalized by (CST): Jeni Celeste MD on 2/12/2024 at 10:54 PM       CT BRAIN OR HEAD ([id number])    Result Date: 2/12/2024  PROCEDURE:  CT BRAIN OR HEAD ([id number])  COMPARISON:  [facility] , CT, CT BRAIN OR HEAD ([id number]), 1/23/2024, 8:38 AM.  [facility], CT, CT BRAIN OR HEAD ([id number]), 1/22/2024, 3:23 PM.  INDICATIONS:  hypertension, last bp 186/110. stomach pain  TECHNIQUE:  Noncontrast CT scanning is performed through the brain. Dose reduction techniques were used. Dose information is transmitted to the ACR (American College of Radiology) NRDR (National Radiology Data Registry) which includes the Dose Index Registry.  PATIENT STATED HISTORY: (As transcribed by Technologist)  hypertension and unable to sleep the past 3 days. patient stated she fell a few weeks ago and is feeling dizzy on occasion.    FINDINGS:  The ventricles are symmetric bilaterally. There are patchy areas of low attenuation in the periventricular and deep  white matter which are nonspecific but most consistent with small vessel ischemic changes. There is no evidence of hemorrhage, mass, midline shift, or extra-axial fluid collection.  The visualized portions of the orbits are normal.  The visualized paranasal sinuses show no significant sinus disease.. No evidence of depressed skull fracture.            CONCLUSION: 1. No acute intracranial findings 2. Chronic microvascular ischemic changes    LOCATION:  [facility]   Dictated by (CST): Jeni Celeste MD on 2/12/2024 at 10:39 PM     Finalized by (CST): Jeni Celeste MD on 2/12/2024 at 10:40 PM       CT BRAIN OR HEAD ([id number])    Result Date: 1/23/2024  PROCEDURE:  CT BRAIN OR HEAD (60902)  COMPARISON:  [facility], CT, CT BRAIN OR HEAD ([id number]), 1/22/2024, 3:23 PM.  INDICATIONS:  SDH interval scan to monitor  TECHNIQUE:  Noncontrast CT scanning is performed through the brain. Dose reduction techniques were used. Dose information is transmitted to the ACR (American College of Radiology) NRDR (National Radiology Data Registry) which includes the Dose Index Registry.  PATIENT STATED HISTORY: (As transcribed by Technologist)  Patient is here for interval scan to monitor SDH.    FINDINGS:  Ventricles and sulci are within normal limits.  Trace age indeterminate small vessel ischemic disease.  There is no midline shift or mass-effect.  The basal cisterns are patent.  The gray-white matter differentiation is intact.  There is a 1 mm thick subdural hemorrhage at the anterior falx, improved since 1/22/2024.  No new region of intracranial hemorrhage is identified.  There is no evident fracture.  The visualized paranasal sinuses and mastoid air cells are unremarkable.            CONCLUSION:   1. There is a 1 mm thick subdural hemorrhage at the anterior falx, improved since 1/22/2024.  No new region of intracranial hemorrhage is identified.  2. Trace age indeterminate small vessel ischemic disease. If there is clinical concern for acute  ischemia/infarction, an MRI of the brain would be recommended for further evaluation.   LOCATION:  [facility]   Dictated by (CST): Stromberg, LeRoy, MD on 1/23/2024 at 9:00 AM     Finalized by (CST): Stromberg, LeRoy, MD on 1/23/2024 at 9:04 AM       XR KNEE (1 OR 2 VIEWS), LEFT (CPT=73560)    Result Date: 1/22/2024  PROCEDURE:  XR KNEE (1 OR 2 VIEWS), LEFT (CPT=73560)  COMPARISON:  [facility] , XR, XR KNEE (1 OR 2 VIEWS), LEFT (CPT=73560), 12/01/2022, 2:35 PM.  INDICATIONS:  L knee pain after trip and fall- lac to posterior scalp. no loc or vomiting. no thinners. bleeding controlled  PATIENT STATED HISTORY: (As transcribed by Technologist)  Pt. with  left  knee pain after trip and fall- lac to posterior scalp.    FINDINGS:  There is anterior knee soft tissue swelling.  No acute displaced osseous fracture is identified.  No hardware fracture is evident.  No significant joint effusion.             CONCLUSION:  See above.   LOCATION:  [id number]   Dictated by (CST): Stromberg, LeRoy, MD on 1/22/2024 at 5:11 PM     Finalized by (CST): Stromberg, LeRoy, MD on 1/22/2024 at 5:12 PM       CT SPINE CERVICAL (CPT=72125)    Result Date: 1/22/2024  PROCEDURE:  CT SPINE CERVICAL (CPT=72125)  COMPARISON:  None.  INDICATIONS:  neck pain after fall with posterior head injury  TECHNIQUE:  Noncontrast CT scanning of the cervical spine is performed from the skull base through C7.  Multiplanar reconstructions are generated.  Dose reduction techniques were used. Dose information is transmitted to the ACR (American College of Radiology) NRDR (National Radiology Data Registry) which includes the Dose Index Registry.  PATIENT STATED HISTORY: (As transcribed by Technologist)  Pt states about 12pm she was leaning over putting her pants on when she lost her balance and fell straight back hitting her head on a tile floor. Pt denies LOC, N/V. Aprrox 1 inch abrasion noted to back of scalp. Bleeding controlled at this time. Pt denies blood thinner use.      FINDINGS:  CRANIOCERVICAL AREA:  Normal foramen magnum with no Chiari malformation. PARASPINAL AREA:  Normal with no visible mass.  BONES:  No fracture, pars defect, or osseous lesion.  CERVICAL DISC LEVELS: C2-C3:  No significant disc/facet abnormality, spinal stenosis, or foraminal stenosis. C3-C4:  Left greater than right facet joint hypertrophy results in severe left neural foraminal stenosis.  The right neural foramina and spinal canal are patent C4-C5:  No significant disc/facet abnormality, spinal stenosis, or foraminal stenosis. C5-C6:  Left greater than right facet joint hypertrophy results in mild-to-moderate bilateral neural foraminal stenosis.  The spinal canal is patent C6-C7:  Facet and uncovertebral joint hypertrophy results in mild-to-moderate bilateral neural foraminal stenosis.  Spinal canal is patent C7-T1:  No significant disc/facet abnormality, spinal stenosis, or foraminal stenosis.            CONCLUSION:  Multilevel degenerative changes.  No acute fracture in the cervical spine.    LOCATION:  [facility]   Dictated by (CST): Duke Mandujano MD on 1/22/2024 at 3:49 PM     Finalized by (CST): Duke Mandujano MD on 1/22/2024 at 3:52 PM       CT BRAIN OR HEAD ([id number])    Result Date: 1/22/2024  PROCEDURE:  CT BRAIN OR HEAD ([id number])  COMPARISON:  [facility], CT BRAIN OR HEAD ([id number]), 8/28/2018, 0:35 AM.  INDICATIONS:  trip and fall- lac to posterior scalp. no loc or vomiting. no thinners. bleeding controlled.  Head trauma.  TECHNIQUE:  Noncontrast CT scanning is performed through the brain. Dose reduction techniques were used. Dose information is transmitted to the ACR (American College of Radiology) NRDR (National Radiology Data Registry) which includes the Dose Index Registry.  PATIENT STATED HISTORY: (As transcribed by Technologist)  Pt states about 12pm she was leaning over putting her pants on when she lost her balance and fell straight back hitting her head on a tile floor. Pt denies LOC, N/V. Aprrox 1  inch abrasion noted to back of scalp. Bleeding controlled at this time. Pt denies blood thinner use.     FINDINGS:  Subtle parafalcine subdural hematoma is noted measuring up to 2 millimeters in thickness.  No significant mass effect is noted.  There is no midline shift.  No abnormal extra-axial collection is identified.  No large vascular distribution infarction is noted.  No intraparenchymal hemorrhage is identified.  No hydrocephalus.  Basal cisterns are patent.  No acute fracture is identified.  Mastoid air cells and paranasal sinuses are clear.            CONCLUSION:  1. Subdural hematoma along the falx  This report was communicated by telephone to Dr. Aguilar at the dictation time shown below.   LOCATION:  [facility]   Dictated by (CST): Duke Mandujano MD on 1/22/2024 at 3:44 PM     Finalized by (CST): Duke Mandujano MD on 1/22/2024 at 3:48 PM         Operative reports:      Hospital course:    Patient is a 63 year old female with PMH sig for htn, hld, dm2, gerd, recent fall and sdh, here for confusion and repeat falls     *confusion / repeated falls  -unclear etiology at this time  -recent SDH but this has reoslved on repeat imaging.   -no obvious infectious etiology. Possible infiltrate on CXR but she has no wbc, fevers, cough, pct neg. Repeat labs stable. Will get 2v CXR - atelectasis vs infiltrate. Suspect former  No urinary symptoms  -labs overall unremakrable. Check b12 / tsh. - b12 low normal, will given IM supplement, tsh normal  -check orthostatics - neg  -she has had lack of sleep - could be contributing to confusion. Increase home xanax to 0.75. already on hydroxyzine and trazadone   -mri brain neg     *chronic conditions  Home meds as able        Day of discharge exam:  Vitals:    02/19/24 1600   BP: 152/75   Pulse: 70   Resp: 18   Temp: 98.4 °F (36.9 °C)     Slept better with hydroxyzine      No acute distress, alert and oriented   Lungs clear  Heart regular  Abdomen benign    Total time coordinating care  32 min      Patient and/or family had opportunity to ask questions and expressed understanding and agreement with therapeutic plan as outlined         [facility]  [phone]  Answering Service: [phone]

## 2024-02-28 ENCOUNTER — ANESTHESIA EVENT (OUTPATIENT)
Dept: SURGERY | Facility: HOSPITAL | Age: 64
End: 2024-02-28
Payer: MEDICARE

## 2024-02-29 ENCOUNTER — APPOINTMENT (OUTPATIENT)
Dept: GENERAL RADIOLOGY | Facility: HOSPITAL | Age: 64
End: 2024-02-29
Attending: ORTHOPAEDIC SURGERY
Payer: MEDICARE

## 2024-02-29 ENCOUNTER — ANESTHESIA (OUTPATIENT)
Dept: SURGERY | Facility: HOSPITAL | Age: 64
End: 2024-02-29
Payer: MEDICARE

## 2024-02-29 ENCOUNTER — HOSPITAL ENCOUNTER (OUTPATIENT)
Facility: HOSPITAL | Age: 64
Discharge: HOME | End: 2024-03-01
Attending: ORTHOPAEDIC SURGERY | Admitting: ORTHOPAEDIC SURGERY
Payer: MEDICARE

## 2024-02-29 DIAGNOSIS — G89.18 ACUTE POSTOPERATIVE PAIN OF RIGHT SHOULDER: ICD-10-CM

## 2024-02-29 DIAGNOSIS — G57.93 NEUROPATHIC PAIN OF BOTH FEET: ICD-10-CM

## 2024-02-29 DIAGNOSIS — M25.511 ACUTE PAIN OF RIGHT SHOULDER: ICD-10-CM

## 2024-02-29 DIAGNOSIS — E53.8 B12 DEFICIENCY: ICD-10-CM

## 2024-02-29 DIAGNOSIS — M25.511 ACUTE POSTOPERATIVE PAIN OF RIGHT SHOULDER: ICD-10-CM

## 2024-02-29 DIAGNOSIS — G62.9 SENSORY NEUROPATHY: ICD-10-CM

## 2024-02-29 DIAGNOSIS — Z01.818 PRE-OP TESTING: Primary | ICD-10-CM

## 2024-02-29 LAB
GLUCOSE BLD-MCNC: 105 MG/DL (ref 70–99)
GLUCOSE BLD-MCNC: 115 MG/DL (ref 70–99)
GLUCOSE BLD-MCNC: 93 MG/DL (ref 70–99)

## 2024-02-29 PROCEDURE — 73030 X-RAY EXAM OF SHOULDER: CPT | Performed by: ORTHOPAEDIC SURGERY

## 2024-02-29 PROCEDURE — 73020 X-RAY EXAM OF SHOULDER: CPT | Performed by: ORTHOPAEDIC SURGERY

## 2024-02-29 PROCEDURE — 82962 GLUCOSE BLOOD TEST: CPT

## 2024-02-29 PROCEDURE — 0RRK00Z REPLACEMENT OF LEFT SHOULDER JOINT WITH REVERSE BALL AND SOCKET SYNTHETIC SUBSTITUTE, OPEN APPROACH: ICD-10-PCS | Performed by: ORTHOPAEDIC SURGERY

## 2024-02-29 PROCEDURE — 76942 ECHO GUIDE FOR BIOPSY: CPT | Performed by: STUDENT IN AN ORGANIZED HEALTH CARE EDUCATION/TRAINING PROGRAM

## 2024-02-29 DEVICE — INHANCE SHOULDER SYSTEM  MODULAR BASEPLATE SMALL Ø24MM CEMENTLESS
Type: IMPLANTABLE DEVICE | Site: SHOULDER | Status: FUNCTIONAL
Brand: INHANCE

## 2024-02-29 DEVICE — INHANCE SHOULDER SYSTEM GLENOSPHERE Ø32+4MM
Type: IMPLANTABLE DEVICE | Site: SHOULDER | Status: FUNCTIONAL
Brand: INHANCE

## 2024-02-29 DEVICE — INHANCE SHOULDER SYSTEM  REVERSE LINER RETENTIVE X-LINKED VITAMIN E PE Ø32+0MM
Type: IMPLANTABLE DEVICE | Site: SHOULDER | Status: FUNCTIONAL
Brand: INHANCE

## 2024-02-29 DEVICE — INHANCE SHOULDER SYSTEM  STANDARD STEM MEDIUM Ø36MM X 115MM
Type: IMPLANTABLE DEVICE | Site: SHOULDER | Status: FUNCTIONAL
Brand: INHANCE

## 2024-02-29 DEVICE — SMARTSET HV HIGH VISCOSITY BONE CEMENT 40G
Type: IMPLANTABLE DEVICE | Site: SHOULDER | Status: FUNCTIONAL
Brand: SMARTSET

## 2024-02-29 DEVICE — INHANCE SHOULDER SYSTEM  REVERSE HUMERAL SHELL MEDIUM Ø36+0MM
Type: IMPLANTABLE DEVICE | Site: SHOULDER | Status: FUNCTIONAL
Brand: INHANCE

## 2024-02-29 DEVICE — IMPLANTABLE DEVICE
Type: IMPLANTABLE DEVICE | Site: SHOULDER
Brand: QUIK-USE®

## 2024-02-29 DEVICE — INHANCE SHOULDER SYSTEM  CENTRAL SCREW Ø6.0 X 25MM
Type: IMPLANTABLE DEVICE | Site: SHOULDER | Status: FUNCTIONAL
Brand: INHANCE

## 2024-02-29 DEVICE — INHANCE SHOULDER SYSTEM  LOCKING SCREW 25MM
Type: IMPLANTABLE DEVICE | Site: SHOULDER | Status: FUNCTIONAL
Brand: INHANCE

## 2024-02-29 DEVICE — INHANCE SHOULDER SYSTEM  LOCKING SCREW 20MM
Type: IMPLANTABLE DEVICE | Site: SHOULDER | Status: FUNCTIONAL
Brand: INHANCE

## 2024-02-29 RX ORDER — ENEMA 19; 7 G/133ML; G/133ML
1 ENEMA RECTAL ONCE AS NEEDED
Status: DISCONTINUED | OUTPATIENT
Start: 2024-02-29 | End: 2024-03-01

## 2024-02-29 RX ORDER — FENOFIBRATE 134 MG/1
134 CAPSULE ORAL
Status: DISCONTINUED | OUTPATIENT
Start: 2024-03-01 | End: 2024-03-01

## 2024-02-29 RX ORDER — CEFAZOLIN SODIUM/WATER 2 G/20 ML
2 SYRINGE (ML) INTRAVENOUS EVERY 8 HOURS
Qty: 40 ML | Refills: 0 | Status: COMPLETED | OUTPATIENT
Start: 2024-02-29 | End: 2024-03-01

## 2024-02-29 RX ORDER — TRAMADOL HYDROCHLORIDE 50 MG/1
50 TABLET ORAL EVERY 6 HOURS PRN
Status: DISCONTINUED | OUTPATIENT
Start: 2024-02-29 | End: 2024-03-01

## 2024-02-29 RX ORDER — ACETAMINOPHEN 500 MG
1000 TABLET ORAL ONCE
Status: DISCONTINUED | OUTPATIENT
Start: 2024-02-29 | End: 2024-03-01

## 2024-02-29 RX ORDER — LABETALOL HYDROCHLORIDE 5 MG/ML
5 INJECTION, SOLUTION INTRAVENOUS EVERY 5 MIN PRN
Status: DISCONTINUED | OUTPATIENT
Start: 2024-02-29 | End: 2024-02-29 | Stop reason: HOSPADM

## 2024-02-29 RX ORDER — ACETAMINOPHEN 500 MG
1000 TABLET ORAL ONCE AS NEEDED
Status: DISCONTINUED | OUTPATIENT
Start: 2024-02-29 | End: 2024-02-29 | Stop reason: HOSPADM

## 2024-02-29 RX ORDER — LIDOCAINE HYDROCHLORIDE 10 MG/ML
INJECTION, SOLUTION EPIDURAL; INFILTRATION; INTRACAUDAL; PERINEURAL AS NEEDED
Status: DISCONTINUED | OUTPATIENT
Start: 2024-02-29 | End: 2024-02-29 | Stop reason: SURG

## 2024-02-29 RX ORDER — DOCUSATE SODIUM 100 MG/1
100 CAPSULE, LIQUID FILLED ORAL 2 TIMES DAILY
Status: DISCONTINUED | OUTPATIENT
Start: 2024-02-29 | End: 2024-03-01

## 2024-02-29 RX ORDER — NICOTINE POLACRILEX 4 MG
15 LOZENGE BUCCAL
Status: DISCONTINUED | OUTPATIENT
Start: 2024-02-29 | End: 2024-02-29 | Stop reason: HOSPADM

## 2024-02-29 RX ORDER — ONDANSETRON 2 MG/ML
INJECTION INTRAMUSCULAR; INTRAVENOUS
Status: COMPLETED
Start: 2024-02-29 | End: 2024-02-29

## 2024-02-29 RX ORDER — POLYETHYLENE GLYCOL 3350 17 G/17G
17 POWDER, FOR SOLUTION ORAL DAILY PRN
Status: DISCONTINUED | OUTPATIENT
Start: 2024-02-29 | End: 2024-03-01

## 2024-02-29 RX ORDER — DEXAMETHASONE SODIUM PHOSPHATE 4 MG/ML
VIAL (ML) INJECTION AS NEEDED
Status: DISCONTINUED | OUTPATIENT
Start: 2024-02-29 | End: 2024-02-29 | Stop reason: SURG

## 2024-02-29 RX ORDER — METOCLOPRAMIDE HYDROCHLORIDE 5 MG/ML
10 INJECTION INTRAMUSCULAR; INTRAVENOUS EVERY 8 HOURS PRN
Status: DISCONTINUED | OUTPATIENT
Start: 2024-02-29 | End: 2024-03-01

## 2024-02-29 RX ORDER — NICOTINE POLACRILEX 4 MG
15 LOZENGE BUCCAL
Status: DISCONTINUED | OUTPATIENT
Start: 2024-02-29 | End: 2024-03-01

## 2024-02-29 RX ORDER — SODIUM CHLORIDE, SODIUM LACTATE, POTASSIUM CHLORIDE, CALCIUM CHLORIDE 600; 310; 30; 20 MG/100ML; MG/100ML; MG/100ML; MG/100ML
INJECTION, SOLUTION INTRAVENOUS CONTINUOUS
Status: DISCONTINUED | OUTPATIENT
Start: 2024-02-29 | End: 2024-02-29 | Stop reason: HOSPADM

## 2024-02-29 RX ORDER — ATORVASTATIN CALCIUM 40 MG/1
40 TABLET, FILM COATED ORAL NIGHTLY
Status: DISCONTINUED | OUTPATIENT
Start: 2024-02-29 | End: 2024-03-01

## 2024-02-29 RX ORDER — IBUPROFEN 600 MG/1
600 TABLET ORAL 4 TIMES DAILY
Status: DISCONTINUED | OUTPATIENT
Start: 2024-02-29 | End: 2024-03-01

## 2024-02-29 RX ORDER — HYDROCODONE BITARTRATE AND ACETAMINOPHEN 5; 325 MG/1; MG/1
1 TABLET ORAL ONCE AS NEEDED
Status: DISCONTINUED | OUTPATIENT
Start: 2024-02-29 | End: 2024-02-29 | Stop reason: HOSPADM

## 2024-02-29 RX ORDER — DIPHENHYDRAMINE HYDROCHLORIDE 50 MG/ML
25 INJECTION INTRAMUSCULAR; INTRAVENOUS ONCE AS NEEDED
Status: ACTIVE | OUTPATIENT
Start: 2024-02-29 | End: 2024-02-29

## 2024-02-29 RX ORDER — MIDAZOLAM HYDROCHLORIDE 1 MG/ML
INJECTION INTRAMUSCULAR; INTRAVENOUS AS NEEDED
Status: DISCONTINUED | OUTPATIENT
Start: 2024-02-29 | End: 2024-02-29 | Stop reason: SURG

## 2024-02-29 RX ORDER — MIDAZOLAM HYDROCHLORIDE 1 MG/ML
1 INJECTION INTRAMUSCULAR; INTRAVENOUS EVERY 5 MIN PRN
Status: DISCONTINUED | OUTPATIENT
Start: 2024-02-29 | End: 2024-02-29 | Stop reason: HOSPADM

## 2024-02-29 RX ORDER — DIPHENHYDRAMINE HYDROCHLORIDE 50 MG/ML
12.5 INJECTION INTRAMUSCULAR; INTRAVENOUS AS NEEDED
Status: DISCONTINUED | OUTPATIENT
Start: 2024-02-29 | End: 2024-02-29 | Stop reason: HOSPADM

## 2024-02-29 RX ORDER — SENNOSIDES 8.6 MG
17.2 TABLET ORAL NIGHTLY
Status: DISCONTINUED | OUTPATIENT
Start: 2024-02-29 | End: 2024-03-01

## 2024-02-29 RX ORDER — EPHEDRINE SULFATE 50 MG/ML
INJECTION INTRAVENOUS AS NEEDED
Status: DISCONTINUED | OUTPATIENT
Start: 2024-02-29 | End: 2024-02-29 | Stop reason: SURG

## 2024-02-29 RX ORDER — ROPIVACAINE HYDROCHLORIDE 5 MG/ML
INJECTION, SOLUTION EPIDURAL; INFILTRATION; PERINEURAL AS NEEDED
Status: DISCONTINUED | OUTPATIENT
Start: 2024-02-29 | End: 2024-02-29 | Stop reason: SURG

## 2024-02-29 RX ORDER — SODIUM CHLORIDE, SODIUM LACTATE, POTASSIUM CHLORIDE, CALCIUM CHLORIDE 600; 310; 30; 20 MG/100ML; MG/100ML; MG/100ML; MG/100ML
INJECTION, SOLUTION INTRAVENOUS CONTINUOUS
Status: DISCONTINUED | OUTPATIENT
Start: 2024-02-29 | End: 2024-03-01

## 2024-02-29 RX ORDER — GLYCOPYRROLATE 0.2 MG/ML
INJECTION, SOLUTION INTRAMUSCULAR; INTRAVENOUS AS NEEDED
Status: DISCONTINUED | OUTPATIENT
Start: 2024-02-29 | End: 2024-02-29 | Stop reason: SURG

## 2024-02-29 RX ORDER — PREGABALIN 100 MG/1
100 CAPSULE ORAL NIGHTLY
Status: DISCONTINUED | OUTPATIENT
Start: 2024-02-29 | End: 2024-03-01

## 2024-02-29 RX ORDER — ASPIRIN 81 MG/1
81 TABLET ORAL 2 TIMES DAILY
Status: DISCONTINUED | OUTPATIENT
Start: 2024-02-29 | End: 2024-03-01

## 2024-02-29 RX ORDER — PREGABALIN 50 MG/1
50 CAPSULE ORAL DAILY
Status: DISCONTINUED | OUTPATIENT
Start: 2024-03-01 | End: 2024-03-01

## 2024-02-29 RX ORDER — CHOLECALCIFEROL (VITAMIN D3) 125 MCG
500 CAPSULE ORAL DAILY
Status: DISCONTINUED | OUTPATIENT
Start: 2024-02-29 | End: 2024-03-01

## 2024-02-29 RX ORDER — BISACODYL 10 MG
10 SUPPOSITORY, RECTAL RECTAL
Status: DISCONTINUED | OUTPATIENT
Start: 2024-02-29 | End: 2024-03-01

## 2024-02-29 RX ORDER — NICOTINE POLACRILEX 4 MG
30 LOZENGE BUCCAL
Status: DISCONTINUED | OUTPATIENT
Start: 2024-02-29 | End: 2024-03-01

## 2024-02-29 RX ORDER — HYDROXYZINE HYDROCHLORIDE 25 MG/1
50 TABLET, FILM COATED ORAL NIGHTLY PRN
Status: DISCONTINUED | OUTPATIENT
Start: 2024-02-29 | End: 2024-03-01

## 2024-02-29 RX ORDER — ONDANSETRON 2 MG/ML
INJECTION INTRAMUSCULAR; INTRAVENOUS AS NEEDED
Status: DISCONTINUED | OUTPATIENT
Start: 2024-02-29 | End: 2024-02-29 | Stop reason: SURG

## 2024-02-29 RX ORDER — ONDANSETRON 2 MG/ML
4 INJECTION INTRAMUSCULAR; INTRAVENOUS EVERY 6 HOURS PRN
Status: DISCONTINUED | OUTPATIENT
Start: 2024-02-29 | End: 2024-02-29 | Stop reason: HOSPADM

## 2024-02-29 RX ORDER — HYDROMORPHONE HYDROCHLORIDE 1 MG/ML
0.6 INJECTION, SOLUTION INTRAMUSCULAR; INTRAVENOUS; SUBCUTANEOUS EVERY 5 MIN PRN
Status: DISCONTINUED | OUTPATIENT
Start: 2024-02-29 | End: 2024-02-29 | Stop reason: HOSPADM

## 2024-02-29 RX ORDER — LISINOPRIL 40 MG/1
40 TABLET ORAL DAILY
Status: DISCONTINUED | OUTPATIENT
Start: 2024-03-01 | End: 2024-03-01

## 2024-02-29 RX ORDER — DEXTROSE MONOHYDRATE 25 G/50ML
50 INJECTION, SOLUTION INTRAVENOUS
Status: DISCONTINUED | OUTPATIENT
Start: 2024-02-29 | End: 2024-02-29 | Stop reason: HOSPADM

## 2024-02-29 RX ORDER — PREDNISONE 20 MG/1
20 TABLET ORAL DAILY
COMMUNITY
Start: 2024-02-27 | End: 2024-03-08

## 2024-02-29 RX ORDER — HYDROMORPHONE HYDROCHLORIDE 1 MG/ML
0.4 INJECTION, SOLUTION INTRAMUSCULAR; INTRAVENOUS; SUBCUTANEOUS EVERY 5 MIN PRN
Status: DISCONTINUED | OUTPATIENT
Start: 2024-02-29 | End: 2024-02-29 | Stop reason: HOSPADM

## 2024-02-29 RX ORDER — LABETALOL HYDROCHLORIDE 5 MG/ML
INJECTION, SOLUTION INTRAVENOUS AS NEEDED
Status: DISCONTINUED | OUTPATIENT
Start: 2024-02-29 | End: 2024-02-29 | Stop reason: SURG

## 2024-02-29 RX ORDER — MEPERIDINE HYDROCHLORIDE 25 MG/ML
12.5 INJECTION INTRAMUSCULAR; INTRAVENOUS; SUBCUTANEOUS AS NEEDED
Status: DISCONTINUED | OUTPATIENT
Start: 2024-02-29 | End: 2024-02-29 | Stop reason: HOSPADM

## 2024-02-29 RX ORDER — HYDROMORPHONE HYDROCHLORIDE 1 MG/ML
0.2 INJECTION, SOLUTION INTRAMUSCULAR; INTRAVENOUS; SUBCUTANEOUS EVERY 5 MIN PRN
Status: DISCONTINUED | OUTPATIENT
Start: 2024-02-29 | End: 2024-02-29 | Stop reason: HOSPADM

## 2024-02-29 RX ORDER — VENLAFAXINE HYDROCHLORIDE 75 MG/1
225 CAPSULE, EXTENDED RELEASE ORAL DAILY
Status: DISCONTINUED | OUTPATIENT
Start: 2024-03-01 | End: 2024-03-01

## 2024-02-29 RX ORDER — CEFAZOLIN SODIUM/WATER 2 G/20 ML
2 SYRINGE (ML) INTRAVENOUS ONCE
Status: COMPLETED | OUTPATIENT
Start: 2024-02-29 | End: 2024-02-29

## 2024-02-29 RX ORDER — METOCLOPRAMIDE HYDROCHLORIDE 5 MG/ML
10 INJECTION INTRAMUSCULAR; INTRAVENOUS EVERY 8 HOURS PRN
Status: DISCONTINUED | OUTPATIENT
Start: 2024-02-29 | End: 2024-02-29 | Stop reason: HOSPADM

## 2024-02-29 RX ORDER — BUPRENORPHINE HYDROCHLORIDE 0.32 MG/ML
INJECTION INTRAMUSCULAR; INTRAVENOUS AS NEEDED
Status: DISCONTINUED | OUTPATIENT
Start: 2024-02-29 | End: 2024-02-29 | Stop reason: SURG

## 2024-02-29 RX ORDER — ARIPIPRAZOLE 5 MG/1
5 TABLET ORAL NIGHTLY
Status: DISCONTINUED | OUTPATIENT
Start: 2024-02-29 | End: 2024-03-01

## 2024-02-29 RX ORDER — BUSPIRONE HYDROCHLORIDE 5 MG/1
10 TABLET ORAL 2 TIMES DAILY
Status: DISCONTINUED | OUTPATIENT
Start: 2024-02-29 | End: 2024-03-01

## 2024-02-29 RX ORDER — ALPRAZOLAM 0.5 MG/1
0.5 TABLET ORAL NIGHTLY PRN
Status: DISCONTINUED | OUTPATIENT
Start: 2024-02-29 | End: 2024-03-01

## 2024-02-29 RX ORDER — NALOXONE HYDROCHLORIDE 0.4 MG/ML
0.08 INJECTION, SOLUTION INTRAMUSCULAR; INTRAVENOUS; SUBCUTANEOUS AS NEEDED
Status: DISCONTINUED | OUTPATIENT
Start: 2024-02-29 | End: 2024-02-29 | Stop reason: HOSPADM

## 2024-02-29 RX ORDER — TRANEXAMIC ACID 10 MG/ML
INJECTION, SOLUTION INTRAVENOUS AS NEEDED
Status: DISCONTINUED | OUTPATIENT
Start: 2024-02-29 | End: 2024-02-29 | Stop reason: SURG

## 2024-02-29 RX ORDER — PREDNISONE 20 MG/1
20 TABLET ORAL DAILY
Status: DISCONTINUED | OUTPATIENT
Start: 2024-02-29 | End: 2024-03-01

## 2024-02-29 RX ORDER — NEOSTIGMINE METHYLSULFATE 1 MG/ML
INJECTION, SOLUTION INTRAVENOUS AS NEEDED
Status: DISCONTINUED | OUTPATIENT
Start: 2024-02-29 | End: 2024-02-29 | Stop reason: SURG

## 2024-02-29 RX ORDER — HYDROCODONE BITARTRATE AND ACETAMINOPHEN 5; 325 MG/1; MG/1
2 TABLET ORAL ONCE AS NEEDED
Status: DISCONTINUED | OUTPATIENT
Start: 2024-02-29 | End: 2024-02-29 | Stop reason: HOSPADM

## 2024-02-29 RX ORDER — DEXAMETHASONE SODIUM PHOSPHATE 10 MG/ML
INJECTION, SOLUTION INTRAMUSCULAR; INTRAVENOUS AS NEEDED
Status: DISCONTINUED | OUTPATIENT
Start: 2024-02-29 | End: 2024-02-29 | Stop reason: SURG

## 2024-02-29 RX ORDER — PHENYLEPHRINE HCL 10 MG/ML
VIAL (ML) INJECTION AS NEEDED
Status: DISCONTINUED | OUTPATIENT
Start: 2024-02-29 | End: 2024-02-29 | Stop reason: SURG

## 2024-02-29 RX ORDER — NICOTINE POLACRILEX 4 MG
30 LOZENGE BUCCAL
Status: DISCONTINUED | OUTPATIENT
Start: 2024-02-29 | End: 2024-02-29 | Stop reason: HOSPADM

## 2024-02-29 RX ORDER — METOPROLOL SUCCINATE 25 MG/1
25 TABLET, EXTENDED RELEASE ORAL NIGHTLY
Status: DISCONTINUED | OUTPATIENT
Start: 2024-02-29 | End: 2024-03-01

## 2024-02-29 RX ORDER — ROCURONIUM BROMIDE 10 MG/ML
INJECTION, SOLUTION INTRAVENOUS AS NEEDED
Status: DISCONTINUED | OUTPATIENT
Start: 2024-02-29 | End: 2024-02-29 | Stop reason: SURG

## 2024-02-29 RX ORDER — ONDANSETRON 2 MG/ML
4 INJECTION INTRAMUSCULAR; INTRAVENOUS EVERY 6 HOURS PRN
Status: DISCONTINUED | OUTPATIENT
Start: 2024-02-29 | End: 2024-03-01

## 2024-02-29 RX ORDER — DEXTROSE MONOHYDRATE 25 G/50ML
50 INJECTION, SOLUTION INTRAVENOUS
Status: DISCONTINUED | OUTPATIENT
Start: 2024-02-29 | End: 2024-03-01

## 2024-02-29 RX ORDER — MELOXICAM 15 MG/1
15 TABLET ORAL DAILY
COMMUNITY

## 2024-02-29 RX ORDER — SCOLOPAMINE TRANSDERMAL SYSTEM 1 MG/1
1 PATCH, EXTENDED RELEASE TRANSDERMAL ONCE
Status: DISCONTINUED | OUTPATIENT
Start: 2024-02-29 | End: 2024-03-01

## 2024-02-29 RX ORDER — PREGABALIN 50 MG/1
50 CAPSULE ORAL 3 TIMES DAILY
Status: DISCONTINUED | OUTPATIENT
Start: 2024-02-29 | End: 2024-02-29

## 2024-02-29 RX ADMIN — DEXAMETHASONE SODIUM PHOSPHATE 2 MG: 10 INJECTION, SOLUTION INTRAMUSCULAR; INTRAVENOUS at 13:20:00

## 2024-02-29 RX ADMIN — ROCURONIUM BROMIDE 10 MG: 10 INJECTION, SOLUTION INTRAVENOUS at 14:45:00

## 2024-02-29 RX ADMIN — LIDOCAINE HYDROCHLORIDE 5 ML: 10 INJECTION, SOLUTION EPIDURAL; INFILTRATION; INTRACAUDAL; PERINEURAL at 13:22:00

## 2024-02-29 RX ADMIN — SODIUM CHLORIDE, SODIUM LACTATE, POTASSIUM CHLORIDE, CALCIUM CHLORIDE: 600; 310; 30; 20 INJECTION, SOLUTION INTRAVENOUS at 13:12:00

## 2024-02-29 RX ADMIN — PHENYLEPHRINE HCL 100 MCG: 10 MG/ML VIAL (ML) INJECTION at 13:32:00

## 2024-02-29 RX ADMIN — SODIUM CHLORIDE, SODIUM LACTATE, POTASSIUM CHLORIDE, CALCIUM CHLORIDE: 600; 310; 30; 20 INJECTION, SOLUTION INTRAVENOUS at 15:52:00

## 2024-02-29 RX ADMIN — ONDANSETRON 4 MG: 2 INJECTION INTRAMUSCULAR; INTRAVENOUS at 13:51:00

## 2024-02-29 RX ADMIN — NEOSTIGMINE METHYLSULFATE 3 MG: 1 INJECTION, SOLUTION INTRAVENOUS at 15:49:00

## 2024-02-29 RX ADMIN — ROCURONIUM BROMIDE 5 MG: 10 INJECTION, SOLUTION INTRAVENOUS at 15:15:00

## 2024-02-29 RX ADMIN — TRANEXAMIC ACID 1000 MG: 10 INJECTION, SOLUTION INTRAVENOUS at 13:30:00

## 2024-02-29 RX ADMIN — EPHEDRINE SULFATE 10 MG: 50 INJECTION INTRAVENOUS at 14:02:00

## 2024-02-29 RX ADMIN — CEFAZOLIN SODIUM/WATER 2 G: 2 G/20 ML SYRINGE (ML) INTRAVENOUS at 13:25:00

## 2024-02-29 RX ADMIN — PHENYLEPHRINE HCL 100 MCG: 10 MG/ML VIAL (ML) INJECTION at 13:34:00

## 2024-02-29 RX ADMIN — ROCURONIUM BROMIDE 50 MG: 10 INJECTION, SOLUTION INTRAVENOUS at 13:22:00

## 2024-02-29 RX ADMIN — PHENYLEPHRINE HCL 100 MCG: 10 MG/ML VIAL (ML) INJECTION at 13:36:00

## 2024-02-29 RX ADMIN — GLYCOPYRROLATE 0.4 MG: 0.2 INJECTION, SOLUTION INTRAMUSCULAR; INTRAVENOUS at 15:49:00

## 2024-02-29 RX ADMIN — ROPIVACAINE HYDROCHLORIDE 25 ML: 5 INJECTION, SOLUTION EPIDURAL; INFILTRATION; PERINEURAL at 13:20:00

## 2024-02-29 RX ADMIN — DEXAMETHASONE SODIUM PHOSPHATE 4 MG: 4 MG/ML VIAL (ML) INJECTION at 13:25:00

## 2024-02-29 RX ADMIN — BUPRENORPHINE HYDROCHLORIDE 150 MCG: 0.32 INJECTION INTRAMUSCULAR; INTRAVENOUS at 13:20:00

## 2024-02-29 RX ADMIN — PHENYLEPHRINE HCL 100 MCG: 10 MG/ML VIAL (ML) INJECTION at 13:52:00

## 2024-02-29 RX ADMIN — SODIUM CHLORIDE, SODIUM LACTATE, POTASSIUM CHLORIDE, CALCIUM CHLORIDE: 600; 310; 30; 20 INJECTION, SOLUTION INTRAVENOUS at 15:07:00

## 2024-02-29 RX ADMIN — LABETALOL HYDROCHLORIDE 5 MG: 5 INJECTION, SOLUTION INTRAVENOUS at 15:57:00

## 2024-02-29 RX ADMIN — LABETALOL HYDROCHLORIDE 5 MG: 5 INJECTION, SOLUTION INTRAVENOUS at 15:45:00

## 2024-02-29 RX ADMIN — MIDAZOLAM HYDROCHLORIDE 2 MG: 1 INJECTION INTRAMUSCULAR; INTRAVENOUS at 13:05:00

## 2024-02-29 RX ADMIN — EPHEDRINE SULFATE 10 MG: 50 INJECTION INTRAVENOUS at 13:55:00

## 2024-02-29 RX ADMIN — EPHEDRINE SULFATE 10 MG: 50 INJECTION INTRAVENOUS at 13:59:00

## 2024-02-29 NOTE — ANESTHESIA PROCEDURE NOTES
Airway  Date/Time: 2/29/2024 1:23 PM  Urgency: elective    Airway not difficult    General Information and Staff    Patient location during procedure: OR  Anesthesiologist: Tashi Bray MD  Performed: anesthesiologist   Performed by: Tashi Bray MD  Authorized by: Tashi Bray MD      Indications and Patient Condition  Indications for airway management: anesthesia  Sedation level: deep  Preoxygenated: yes  Patient position: sniffing  Mask difficulty assessment: 2 - vent by mask + OA or adjuvant +/- NMBA    Final Airway Details  Final airway type: endotracheal airway      Successful airway: ETT  Cuffed: yes   Successful intubation technique: direct laryngoscopy  Endotracheal tube insertion site: oral  Blade: Mack  Blade size: #3  ETT size (mm): 7.0    Cormack-Lehane Classification: grade I - full view of glottis  Placement verified by: capnometry   Measured from: lips  ETT to lips (cm): 22  Number of attempts at approach: 1

## 2024-02-29 NOTE — H&P
[facility]  History & Physical    Shalonda Batista Patient Status:  Outpatient in a Bed    1960 MRN [id number]   Location [facility] PERIOPERATIVE SERVICE Attending Jimi Garcia MD   Hosp Day # 0 PCP Coco Du DO     History of Present Illness:  Shalonda Batista is a(n) 63 year old female. L shoulder pain.  Pt with previous MRI and x-rays which showed cuff tear arthropathy.  She failed PT, NSAIDs, and injections.  She wished to proceed with surgery.    History:  Past Medical History:   Diagnosis Date    Acute subdural hematoma (HCC) 2024    per pt currently resolved    Anxiety state     Back pain     Back problem     Depression     Diabetes (HCC)     High blood pressure     High cholesterol     Hypokalemia     Hypomagnesemia     Insomnia     Neuropathy     Osteoarthritis     Other and unspecified hyperlipidemia     Unspecified essential hypertension     Visual impairment     glasses     Past Surgical History:   Procedure Laterality Date    ANESTH,SHOULDER REPLACEMENT      right    ARTHROTOMY,OPEN REPAIR MENISCUS Left 2020    BACK SURGERY      laminectomy L4-5-S1    CHOLECYSTECTOMY      EXCIS LUMBAR DISK,ONE LEVEL  2015    redo L5-S1 discectomy    HYSTERECTOMY  2004    partial    OTHER      AVM malformation with coil put in place right side of chest     SPINE SURGERY PROCEDURE UNLISTED      TOTAL KNEE REPLACEMENT Bilateral 2022    R Knee Replacement     Family History   Problem Relation Age of Onset    Cancer Father 79        Lung cancer    Cancer Son     Other (Other) Son         MVA    Cancer Maternal Grandmother         Skin cancer    Other (Other) Maternal Grandfather         TB    Heart Disorder Paternal Grandmother 75        Heart attack    Psychiatric Sister         Depression    Breast Cancer Sister 65        sister at age of 65    Heart Disorder Brother 56        Triple bypass    Hypertension Brother       reports that she quit smoking about 23 years ago. Her  smoking use included cigarettes. She has a 10 pack-year smoking history. She has never been exposed to tobacco smoke. She has never used smokeless tobacco. She reports that she does not drink alcohol and does not use drugs.    Allergies:  Allergies   Allergen Reactions    Oxycodone NAUSEA ONLY, DIZZINESS and FACE FLUSHING     Nausea, dizziness         Home Medications:  Medications Prior to Admission   Medication Sig Dispense Refill Last Dose    predniSONE 20 MG Oral Tab Take 1 tablet (20 mg total) by mouth daily.   not yet started    Meloxicam 15 MG Oral Tab Take 1 tablet (15 mg total) by mouth daily.   post op    cyanocobalamin 1000 MCG Oral Tab Take 0.5 tablets (500 mcg total) by mouth daily.   2/29/2024    traMADol 50 MG Oral Tab Take 1 tablet (50 mg total) by mouth every 6 (six) hours as needed for Pain.   2/28/2024    Benazepril HCl 40 MG Oral Tab Take 1 tablet (40 mg total) by mouth daily.   2/28/2024    metoprolol succinate ER 25 MG Oral Tablet 24 Hr Take 1 tablet (25 mg total) by mouth at bedtime.   2/28/2024 at 2000    Fenofibrate 160 MG Oral Tab Take 1 tablet (160 mg total) by mouth daily.   2/29/2024    ALPRAZolam 0.5 MG Oral Tab Take 1 tablet (0.5 mg total) by mouth nightly as needed for Sleep or Anxiety.   2/29/2024    busPIRone 10 MG Oral Tab Take 1 tablet (10 mg total) by mouth in the morning and 1 tablet (10 mg total) before bedtime.   2/28/2024 at 1900    atorvastatin 40 MG Oral Tab Take 1 tablet (40 mg total) by mouth nightly. 30 tablet 2 2/28/2024 at 1900    ARIPiprazole 5 MG Oral Tab Take 1 tablet (5 mg total) by mouth at bedtime.   2/28/2024 at 1900    TRAZODONE 150 MG Oral Tab Take 1 tablet (150 mg total) by mouth nightly. 90 tablet 0 2/28/2024 at 1900    pregabalin 50 MG Oral Cap Take 1 capsule (50 mg total) by mouth 3 (three) times daily. 90 capsule 1 2/28/2024 at 1900    venlafaxine 75 MG Oral Capsule SR 24 Hr Take 3 capsules (225 mg total) by mouth daily. 270 capsule 1 2/29/2024     hydrOXYzine 50 MG Oral Tab Take 1 tablet (50 mg total) by mouth nightly as needed (insomnia). 20 tablet 0 2/27/2024       Review of Systems:  A comprehensive review of systems was negative.    Physical Exam:  General: Alert, orientated x3.  Cooperative.  No apparent distress.  Vital Signs:  /75 (BP Location: Right arm)   Pulse 68   Temp 98.8 °F (37.1 °C) (Oral)   Resp 18   Ht 5' 9\" (1.753 m)   Wt 221 lb 3.2 oz (100.3 kg)   SpO2 98%   BMI 32.67 kg/m²   HEENT: Exam is unremarkable.  Without scleral icterus.  Mucous membranes are moist. Pupils are equal and round, reactive to light and accommodate.  Pupils are approximately 3mm and react to 2mm with reaction to light.  Oropharynx is clear.  Neck: No tenderness to palpitation.  Full range of motion to flexion and extension, lateral rotation and lateral flexion of cervical spine.  No JVD. Supple.   Lungs: Clear to auscultation bilaterally.  Cardiac: Regular rate and rhythm. No murmur.  Abdomen:  Soft, non-distended, non-tender, with no rebound or guarding.  No peritoneal signs. No ascites.  Liver is within normal limits.  Spleen is not palpable.    Extremities:  No lower extremity edema noted.  Without clubbing or cyanosis.  L shoulder with limited motion due to pain, NVI to fingers  Skin: Normal texture and turgor.  Lymphatic:  No palpable cervical lymphadenopathy.  Neurologic: Cranial nerves are grossly intact.  Motor strength and sensory examination is grossly normal.  No focal neurologic deficit.    Laboratory Data:  MRI and x-rays with cuff tear arthropathy of the shoulder    Impression and Plan:  Patient Active Problem List   Diagnosis    History of back surgery    Postlaminectomy syndrome, lumbar region    Chest pain    Essential hypertension    IFG (impaired fasting glucose)    Hypertriglyceridemia    Right total knee arthroplasty  Global 04/12/2020    Effusion of right knee    Osteonecrosis of right knee region (HCC)    Acute medial meniscal tear,  right, subsequent encounter - possible    Posterior knee pain, right    Right calf pain    Insufficiency fracture of medial femoral condyle (HCC)    Derangement of posterior horn of medial meniscus of right knee    Dizziness    Other closed nondisplaced fracture of proximal end of right humerus with routine healing, subsequent encounter    Hypokalemia    Recurrent falls    Acute pain of right shoulder    Right ear pain    Ceruminosis, right    Complex tear of medial meniscus of left knee as current injury, subsequent encounter    Orthopedic aftercare    Osteoarthritis of glenohumeral joint, right    Status post arthroscopic surgery of left knee    Numbness and tingling in right hand    Injury of right shoulder, initial encounter    Acute postoperative pain of right shoulder    Posterior tibial tendon dysfunction (PTTD) of left lower extremity    BMI 38.0-38.9,adult    Gastroesophageal reflux disease without esophagitis    Acute chest pain    Pedal edema    Peripheral polyneuropathy    Severe obesity (BMI 35.0-39.9) with comorbidity (HCC)    Fatty liver disease, nonalcoholic    Chronic insomnia    Anxiety    Panic disorder    Moderate episode of recurrent major depressive disorder (HCC)    Primary osteoarthritis of left knee    B12 deficiency    Unstable angina (HCC)    Hypomagnesemia    Diarrhea, unspecified type    Dehydration    Hypernatremia    Acute subdural hematoma (HCC)    Fall, initial encounter    Injury of head, initial encounter    Laceration of scalp, initial encounter    Altered mental status, unspecified altered mental status type    Gait abnormality    Memory loss       L shoulder cuff tear arthropathy    Pt failed non-op tx  Surgery offered  Due to arthritis and RTC tear, reverse TSA would be the best option to address the issues  Risk/benefit d/w patient inc stiffness, infection, instability, pain  She shows good understanding and wishes to proceed  To OR today for reverse TSA    Time spent on  counseling/coordination of care:  30 Minutes  Total time spent with patient:  30 Minutes    Jimi Garcia MD  2/29/2024  11:48 AM

## 2024-02-29 NOTE — BRIEF OP NOTE
Pre-Operative Diagnosis: LEFT ROTATOR CUFF TEAR ARTHROPATHY     Post-Operative Diagnosis: LEFT ROTATOR CUFF TEAR ARTHROPATHY      Procedure Performed:   LEFT REVERSE TOTAL SHOULDER ARTHROPLASTY    Surgeon(s) and Role:     * Jimi Garcia MD - Primary    Assistant(s):  PA: Valeria Noriega PA-C     Surgical Findings: c/w dx     Specimen: none     Estimated Blood Loss: Blood Output: 150 mL (2/29/2024  3:47 PM)      Dictation Number:       Jimi Garcia MD  2/29/2024  3:55 PM

## 2024-02-29 NOTE — ANESTHESIA PREPROCEDURE EVALUATION
PRE-OP EVALUATION    Patient Name: Shalonda Batista    Admit Diagnosis: LEFT ROTATOR CUFF TEAR ARTHROPATHY    Pre-op Diagnosis: LEFT ROTATOR CUFF TEAR ARTHROPATHY    LEFT REVERSE TOTAL SHOULDER ARTHROPLASTY    Anesthesia Procedure: LEFT REVERSE TOTAL SHOULDER ARTHROPLASTY (Left)    Surgeon(s) and Role:     * Jimi Garcia MD - Primary    Pre-op vitals reviewed.  Temp: 98.8 °F (37.1 °C)  Pulse: 68  Resp: 18  BP: 129/75  SpO2: 98 %  Body mass index is 32.67 kg/m².    Current medications reviewed.  Hospital Medications:   [MAR Hold] acetaminophen (Tylenol Extra Strength) tab 1,000 mg  1,000 mg Oral Once    [MAR Hold] scopolamine (Transderm-Scop) 1 MG/3DAYS patch 1 patch  1 patch Transdermal Once    lactated ringers infusion   Intravenous Continuous    ceFAZolin (Ancef) 2 g in 20mL IV syringe premix  2 g Intravenous Once       Outpatient Medications:     Medications Prior to Admission   Medication Sig Dispense Refill Last Dose    predniSONE 20 MG Oral Tab Take 1 tablet (20 mg total) by mouth daily.   not yet started    Meloxicam 15 MG Oral Tab Take 1 tablet (15 mg total) by mouth daily.   post op    cyanocobalamin 1000 MCG Oral Tab Take 0.5 tablets (500 mcg total) by mouth daily.   2/29/2024    traMADol 50 MG Oral Tab Take 1 tablet (50 mg total) by mouth every 6 (six) hours as needed for Pain.   2/28/2024    Benazepril HCl 40 MG Oral Tab Take 1 tablet (40 mg total) by mouth daily.   2/28/2024    metoprolol succinate ER 25 MG Oral Tablet 24 Hr Take 1 tablet (25 mg total) by mouth at bedtime.   2/28/2024 at 2000    Fenofibrate 160 MG Oral Tab Take 1 tablet (160 mg total) by mouth daily.   2/29/2024    ALPRAZolam 0.5 MG Oral Tab Take 1 tablet (0.5 mg total) by mouth nightly as needed for Sleep or Anxiety.   2/29/2024    busPIRone 10 MG Oral Tab Take 1 tablet (10 mg total) by mouth in the morning and 1 tablet (10 mg total) before bedtime.   2/28/2024 at 1900    atorvastatin 40 MG Oral Tab Take 1 tablet (40 mg total) by  mouth nightly. 30 tablet 2 2/28/2024 at 1900    ARIPiprazole 5 MG Oral Tab Take 1 tablet (5 mg total) by mouth at bedtime.   2/28/2024 at 1900    TRAZODONE 150 MG Oral Tab Take 1 tablet (150 mg total) by mouth nightly. 90 tablet 0 2/28/2024 at 1900    pregabalin 50 MG Oral Cap Take 1 capsule (50 mg total) by mouth 3 (three) times daily. 90 capsule 1 2/28/2024 at 1900    venlafaxine 75 MG Oral Capsule SR 24 Hr Take 3 capsules (225 mg total) by mouth daily. 270 capsule 1 2/29/2024    hydrOXYzine 50 MG Oral Tab Take 1 tablet (50 mg total) by mouth nightly as needed (insomnia). 20 tablet 0 2/27/2024       Allergies: Oxycodone      Anesthesia Evaluation    Patient summary reviewed.    Anesthetic Complications  (-) history of anesthetic complications         GI/Hepatic/Renal      (+) GERD          (+) liver disease                 Cardiovascular      ECG reviewed.  Exercise tolerance: good     MET: >4    (+) obesity  (+) hypertension   (+) hyperlipidemia                                  Endo/Other      (+) diabetes                     (+) arthritis       Pulmonary                           Neuro/Psych      (+) depression  (+) anxiety         (+) neuromuscular disease                     Past Surgical History:   Procedure Laterality Date    ANESTH,SHOULDER REPLACEMENT      right    ARTHROTOMY,OPEN REPAIR MENISCUS Left 01/13/2020    BACK SURGERY  2000/2001    laminectomy L4-5-S1    CHOLECYSTECTOMY      EXCIS LUMBAR DISK,ONE LEVEL  2015    redo L5-S1 discectomy    HYSTERECTOMY  2004    partial    OTHER      AVM malformation with coil put in place right side of chest     SPINE SURGERY PROCEDURE UNLISTED      TOTAL KNEE REPLACEMENT Bilateral 05/2022    R Knee Replacement     Social History     Socioeconomic History    Marital status:    Tobacco Use    Smoking status: Former     Packs/day: 1.00     Years: 10.00     Additional pack years: 0.00     Total pack years: 10.00     Types: Cigarettes     Quit date: 3/25/2000      Years since quittin.9     Passive exposure: Never    Smokeless tobacco: Never   Vaping Use    Vaping Use: Never used   Substance and Sexual Activity    Alcohol use: No     Comment: rare    Drug use: Never    Sexual activity: Not Currently     Partners: Male     History   Drug Use Unknown     Available pre-op labs reviewed.  Lab Results   Component Value Date    WBC 8.4 2024    RBC 4.42 2024    HGB 13.2 2024    HCT 39.5 2024    MCV 89.4 2024    MCH 29.9 2024    MCHC 33.4 2024    RDW 12.4 2024    .0 2024     Lab Results   Component Value Date     2024    K 3.9 2024     2024    CO2 28.0 2024    BUN 20 2024    CREATSERUM 0.95 2024     (H) 2024    CA 9.8 2024            Airway      Mallampati: II  Mouth opening: >3 FB  TM distance: > 6 cm   Cardiovascular    Cardiovascular exam normal.         Dental             Pulmonary                     Other findings              ASA: 3   Plan: general  NPO status verified and patient meets guidelines.    Post-procedure pain management plan discussed with surgeon and patient.  Surgeon requests: regional block  Comment: Options, risks and benefits of anesthesia as outlined in the anesthesia consent were reviewed with the patient. Risks and benefits of GA including sore throat, allergy, nausea, vomiting, dental trauma, pain management modalities were all discussed. Particularly the risk of dental trauma with weakened teeth or crowns, partials, fillings and any non natural teeth due to instrumentation of oral cavity and airway. Patient understands risks and verbally agreed to proceed. All questions answered.The consent was signed without further questions.      Plan/risks discussed with: patient                Present on Admission:  **None**

## 2024-02-29 NOTE — ANESTHESIA PROCEDURE NOTES
Regional Block    Date/Time: 2/29/2024 1:17 PM    Performed by: Tashi Bray MD  Authorized by: Tashi Bray MD      General Information and Staff    Start Time:  2/29/2024 1:17 PM  End Time:  2/29/2024 1:20 PM  Anesthesiologist:  Tashi Bray MD  Performed by:  Anesthesiologist  Patient Location:  OR    Block Placement: Pre Induction  Site Identification: real time ultrasound guided and image stored and retrievable    Block site/laterality marked before start: site marked  Reason for Block: at surgeon's request and post-op pain management    Preanesthetic Checklist: 2 patient identifers, IV checked, risks and benefits discussed, monitors and equipment checked, pre-op evaluation, timeout performed, anesthesia consent, sterile technique used, no prohibitive neurological deficits and no local skin infection at insertion site      Procedure Details    Patient Position:  Supine  Prep: ChloraPrep    Monitoring:  Cardiac monitor, continuous pulse ox and blood pressure cuff  Block Type:  Interscalene  Laterality:  Left  Injection Technique:  Single-shot    Needle    Needle Type:  Short-bevel and echogenic  Needle Gauge:  21 G  Needle Length:  100 mm  Needle Localization:  Ultrasound guidance  Reason for Ultrasound Use: appropriate spread of the medication was noted in real time and no ultrasound evidence of intravascular and/or intraneural injection            Assessment    Injection Assessment:  Good spread noted, negative resistance, negative aspiration for heme, incremental injection and low pressure  Heart Rate Change: No    - Patient tolerated block procedure well without evidence of immediate block related complications.     Medications  2/29/2024 1:17 PM      Additional Comments    Medication:  Ropivacaine 0.5% 25mL with 150 mcg buprenorphine and 2 mg PF dexamethasone

## 2024-02-29 NOTE — ANESTHESIA POSTPROCEDURE EVALUATION
[facility]    Shalonda Batista Patient Status:  Outpatient in a Bed   Age/Gender 63 year old female MRN [id number]   Location [facility] POST ANESTHESIA CARE UNIT Attending Jimi Garcia MD   Hosp Day # 0 PCP Coco Du DO       Anesthesia Post-op Note    LEFT REVERSE TOTAL SHOULDER ARTHROPLASTY    Procedure Summary       Date: 02/29/24 Room / Location:  MAIN OR 06 / [facility] MAIN OR    Anesthesia Start: 1312 Anesthesia Stop: 1610    Procedure: LEFT REVERSE TOTAL SHOULDER ARTHROPLASTY (Left: Shoulder) Diagnosis: (LEFT ROTATOR CUFF TEAR ARTHROPATHY)    Surgeons: Jimi Garcia MD Anesthesiologist: Tashi Bray MD    Anesthesia Type: general ASA Status: 3            Anesthesia Type: general    Vitals Value Taken Time   /76 02/29/24 1616   Temp 97.3 02/29/24 1618   Pulse 67 02/29/24 1618   Resp 21 02/29/24 1618   SpO2 96 % 02/29/24 1618   Vitals shown include unfiled device data.    Patient Location: PACU    Anesthesia Type: general and regional    Airway Patency: patent and extubated    Postop Pain Control: adequate    Mental Status: preanesthetic baseline    Nausea/Vomiting: none    Cardiopulmonary/Hydration status: stable euvolemic    Complications: no apparent anesthesia related complications    Postop vital signs: stable    Dental Exam: Unchanged from Preop    Patient to be discharged from PACU when criteria met.

## 2024-03-01 VITALS
BODY MASS INDEX: 32.73 KG/M2 | HEIGHT: 69 IN | OXYGEN SATURATION: 96 % | DIASTOLIC BLOOD PRESSURE: 54 MMHG | SYSTOLIC BLOOD PRESSURE: 123 MMHG | RESPIRATION RATE: 16 BRPM | HEART RATE: 75 BPM | WEIGHT: 221 LBS | TEMPERATURE: 98 F

## 2024-03-01 LAB
GLUCOSE BLD-MCNC: 127 MG/DL (ref 70–99)
GLUCOSE BLD-MCNC: 80 MG/DL (ref 70–99)
HCT VFR BLD AUTO: 33.2 %
HGB BLD-MCNC: 11.3 G/DL

## 2024-03-01 PROCEDURE — 97165 OT EVAL LOW COMPLEX 30 MIN: CPT

## 2024-03-01 PROCEDURE — 97110 THERAPEUTIC EXERCISES: CPT

## 2024-03-01 PROCEDURE — 82962 GLUCOSE BLOOD TEST: CPT

## 2024-03-01 PROCEDURE — 85018 HEMOGLOBIN: CPT | Performed by: ORTHOPAEDIC SURGERY

## 2024-03-01 PROCEDURE — 85014 HEMATOCRIT: CPT | Performed by: ORTHOPAEDIC SURGERY

## 2024-03-01 RX ORDER — HYDROCODONE BITARTRATE AND ACETAMINOPHEN 10; 325 MG/1; MG/1
1 TABLET ORAL EVERY 6 HOURS PRN
Qty: 20 TABLET | Refills: 0 | Status: SHIPPED | OUTPATIENT
Start: 2024-03-01

## 2024-03-01 RX ORDER — PREGABALIN 50 MG/1
50 CAPSULE ORAL 3 TIMES DAILY
Qty: 30 CAPSULE | Refills: 1 | Status: SHIPPED | OUTPATIENT
Start: 2024-03-01

## 2024-03-01 RX ORDER — PANTOPRAZOLE SODIUM 40 MG/1
40 TABLET, DELAYED RELEASE ORAL
Qty: 10 TABLET | Refills: 0 | Status: SHIPPED | OUTPATIENT
Start: 2024-03-01

## 2024-03-01 NOTE — OPERATIVE REPORT
[facility]    PATIENT'S NAME: AMBER COLLADO   ATTENDING PHYSICIAN: Jimi Garcia M.D.   OPERATING PHYSICIAN: Jimi Garcia M.D.   PATIENT ACCOUNT#:   [id number]    [facility] [address]  MEDICAL RECORD #:   [id number]       YOB: 1960  ADMISSION DATE:       02/29/2024      OPERATION DATE:  02/29/2024    OPERATIVE REPORT    PREOPERATIVE DIAGNOSIS:  Left shoulder cuff tear arthropathy.   POSTOPERATIVE DIAGNOSIS:  Left shoulder cuff tear arthropathy.   PROCEDURE:  Left shoulder reverse total shoulder arthroplasty with DePuy Inhance components, with a small metaglene baseplate, a size 32 +4 glenosphere, a 32 mm +0 retentive poly tray, a medium standard stem was cemented.    ASSISTANT:  Valeria Noriega PA-C, whose duties included helping to position the limb, to hold retractors, and to assist in implantation of the prosthesis.    ANESTHESIA:  General, plus interscalene block.      ESTIMATED BLOOD LOSS:  150 mL.    SPECIMENS:  None.    COMPLICATIONS:  None.    DISPOSITION:  Fair condition to the recovery room.     PLAN:  The patient can begin immediate range of motion exercises.    INDICATIONS:  The patient is a 63-year-old female with a history of left shoulder rotator cuff arthropathy.  She had difficulty despite anti-inflammatories, therapy, and cortisone injection.  She was, therefore, offered surgical intervention.  The risks and benefits of procedure were discussed in detail with the patient including risk of stiffness, chronic pain, skin healing problems, instability, and infection.  She shows good understanding of issues and wished to proceed with surgery.    FINDINGS:  Patient with a cuff tear arthropathy.    OPERATIVE TECHNIQUE:  On the date of operation, I saw the patient in the holding room and initialed the surgical site.  The patient was taken to the operating room where she was placed in supine position on the OR table.  She was given a preop dose of antibiotics and a  preop dose of TXA.  An Interscalene block was performed.  The patient was placed in a modified beach chair position.  The left shoulder was prepped and draped in standard surgical fashion.  A surgical time-out was taken during which the correct patient, surgical site, and procedure were verified.  An anterior approach to the shoulder was performed.  Dissection was carried down through soft tissue, and full-thickness skin flaps were raised.  The interval between the deltoid and pectoralis major was identified.  The cephalic vein was identified and was preserved throughout the case.  There were small crossing veins which were ligated with 3-0 Vicryl suture.  The deltoid was then elevated and the subdeltoid bursa was released.  Dissection was then carried out deep to the conjoined tendon in a blunt fashion, and the axillary nerve was identified and was preserved throughout the case.  The patient had a previous rupture of the long head of the biceps and the bicipital groove was then opened, and the subscapularis was then released by peeling it off the lesser tuberosity.  The vessels at the inferior border of the subscapularis were cauterized.  Once the subscapularis and anterior capsule were released, the shoulder was dislocated.  The cutting guide was then placed and the cut was performed in 20 degrees of retroversion.  A size medium humerus guide was then placed.  Central wire was placed, and the Blaze osteotome was used to prepare the proximal humerus.  This was left in place, and the shoulder was then reduced.    The glenoid was then prepared.  The remnants of the labrum were then sharply excised, and the inferior and posterior capsules were released to expose the labrum.  A size small baseplate was found to be a good fit.  The guide was placed in the central portion of the inferior area of the glenoid.  The central hole was then drilled going bicortically.  This was measured at 25 mm depth.  The reamer was then  used to ream down to subchondral bone.  The baseplate was then press-fit into place.  A central screw was placed, and then 3 peripheral locking screws were placed to secure the baseplate.  The handheld reamer was used to clear the soft tissue from the edge of the plate.  The glenosphere was then press-fit onto the baseplate and the central thread was then used to secure the glenosphere.  Once this was stabilized, the shoulder was dislocated, and the trial was then placed using the guide for the lateral portion of the trial component.  An attempt at press-fitting was performed, but the patient had poor bone stock.  Decision was made to proceed with cementing.  The cement was then mixed, and then once it was partially thickened, it was wrapped around the stem.  This was then placed into the canal.  Once the excess cement had been removed and the cement had fully cured, the humeral plate was then press-fit onto the stem.  A 32 +0 retentive poly was found to be a good fit.  This was then press-fit onto the tray, and the shoulder was reduced.  Good motion was noted, and good stability was noted as well.  The wound was copiously irrigated, and the skin flaps were closed with 2-0 Vicryl in the subcutaneous tissue, followed by 4-0 subcuticular Monocryl stitch.  Sterile bulky dressings were applied.  The patient was awakened from anesthesia and was taken to the recovery room in fair condition.  She will be admitted for postoperative observation but can begin range of motion exercises postop day 1.      Dictated By Jimi Garcia M.D.  d: 02/29/2024 16:03:24  t: 02/29/2024 18:33:13  [facility] [id number]  /

## 2024-03-01 NOTE — PLAN OF CARE
Patient A&O X4 on RA- 2L O2 post-op. VSS, /IS. SCDs, ASA. Voiding freely via bathroom, [facility] 2/28. Surgical incision to left shoulder covered with microfoam tape, c/d/i. Numbness still present but slowly improving. Pain controlled with PO medication. Gel ice as needed. OT to eval. Sling in place to left arm. Up standby assist w/ cane. Reminded to use call light. Plan to dc home when cleared.

## 2024-03-01 NOTE — OCCUPATIONAL THERAPY NOTE
OCCUPATIONAL THERAPY EVALUATION - INPATIENT    Room Number: 384/384-A  Evaluation Date: 3/1/2024     Type of Evaluation: Initial  Presenting Problem: S/p left reverse TSA    Physician Order: IP Consult to Occupational Therapy  Reason for Therapy:  ADL/IADL Dysfunction and Discharge Planning      OCCUPATIONAL THERAPY ASSESSMENT   Patient is a 63 year old female admitted on 2/29/2024 from home for elective surgery. Pt is s/p left reverse TSA with Dr. Garcia. Co-Morbidities : L shoulder cuff tear, arthropathy  htn, hld, dm2, gerd, recent fall and sdh. Patient participated in all necessary education and has met all OT goals at Super[facility] to MOD I level, except MIN A for UB/LB dressing which family can provide.  Patient reports no further questions/concerns at this time.     Recent admissions:  2/16-2/19/24 for falls, confusion --> Dc'd home  1/22-1/23/24 for fall and SDH --> Dc'd home    WEIGHT BEARING RESTRICTION  Weight Bearing Restriction: L lower extremity           L Lower Extremity: Weight Bearing as Tolerated (in sling except for exercises and ADLs)    Recommendations for nursing staff:   Transfers: SBA with cane  Toileting location: Toilet    EVALUATION SESSION:  Patient at start of session: semi-supine  FUNCTIONAL TRANSFER ASSESSMENT  Sit to Stand: Edge of Bed; Chair  Edge of Bed: Modified Independent  Chair: Modified Independent  Toilet Transfer: Modified Independent    BED MOBILITY  Supine to Sit : Stand-by Assist  Scooting: IND    BALANCE ASSESSMENT  Static Sitting: Independent  Sitting Bilateral: Independent  Static Standing: Modified Independent  Standing Bilateral: Modified Independent    FUNCTIONAL ADL ASSESSMENT  Grooming Standing: Modified Independent  UB Dressing Seated: Minimal Assist  LB Dressing Seated: Stand-by Assist  LB Dressing Standing: Minimal Assist  Toileting Seated: Modified Independent  Toileting Standing: Minimal Assist (to manage underwear/pants up around posterior left  side)      ACTIVITY TOLERANCE: Pt on room air and denies SOB, dizziness or lightheadedness throughout session. No significant change in vitals noted.                          O2 SATURATIONS       COGNITION  Arousal/Alertness:  appropriate responses to stimuli  Orientation Level:  oriented x4  Following Commands:  follows all commands and directions without difficulty  Safety Judgement:  good awareness of safety precautions  COGNITION ASSESSMENTS       Upper Extremity:   ROM/strength: RUE within functional limits; LUE impaired, tolerates therex as prescribed by Dr. Garcia  Coordination:  Gross motor: wfl except L shoulder   Fine motor: wfl    EDUCATION PROVIDED  Patient: Role of Occupational Therapy; Plan of Care; Adaptive Equipment Recommendations; DME Recommendations; Functional Transfer Techniques; Fall Prevention; Weight Bear Status; Surgical Precautions; Posture/Positioning; Energy Conservation; Compensatory ADL Techniques; Proper Body Mechanics; UE HEP for ROM  Patient's Response to Education: Returned Demonstration; Verbalized Understanding    Equipment used: cane  Demonstrates functional use    Therapist comments: Issued written handout for LUE AROM exercises per Dr. Garcia's protocol and Pt completed 1 set x 10 reps each. Pt able to don/doff sling following education. Pt familiar with some education re: UB/LB dressing d/t history of R TSA.     Patient End of Session: Up in chair;Needs met;Call light within reach;RN aware of session/findings;All patient questions and concerns addressed;Ice applied    OCCUPATIONAL PROFILE    HOME SITUATION  Type of Home: [facility]  Home Layout: Two level  Lives With: Daughter;Family    Toilet and Equipment: Standard height toilet  Shower/Tub and Equipment: Walk-in shower;Grab bar;Shower chair  Other Equipment:  (cane, walkers)          Drives: No       Prior Level of Function: Pt lives with her daughter and grown grandchildren in 2 level [facility]. Pt is typically mod I with  all aspects of mobility and self-cares with use of cane or walker. Pt reports ~4 falls in the last 6 months, however PT note from recent admission indicated 10-15 falls in last 6 months.     SUBJECTIVE  Pt is pleasant and motivated to participate in therapy.     PAIN ASSESSMENT  Rating: 3  Location: LUE  Management Techniques: Body mechanics;Relaxation;Repositioning    OBJECTIVE     Fall Risk: Standard fall risk    WEIGHT BEARING RESTRICTION  Weight Bearing Restriction: L lower extremity           L Lower Extremity: Weight Bearing as Tolerated (in sling except for exercises and ADLs)    AM-PAC ‘6-Clicks’ Inpatient Daily Activity Short Form  -   Putting on and taking off regular lower body clothing?: A Little  -   Bathing (including washing, rinsing, drying)?: A Little  -   Toileting, which includes using toilet, bedpan or urinal? : A Little  -   Putting on and taking off regular upper body clothing?: A Little  -   Taking care of personal grooming such as brushing teeth?: None  -   Eating meals?: None    AM-PAC Score:  Score: 20  Approx Degree of Impairment: 38.32%  Standardized Score (AM-PAC Scale): 42.03      ADDITIONAL TESTS     NEUROLOGICAL FINDINGS        PLAN   Patient has been evaluated and presents with no skilled Occupational Therapy needs at this time.  Patient discharged from Occupational Therapy services.  Please re-order if a new functional limitation presents during this admission.      Patient Evaluation Complexity Level:   Occupational Profile/Medical History LOW - Brief history including review of medical or therapy records    Specific performance deficits impacting engagement in ADL/IADL MODERATE  3 - 5 performance deficits   Client Assessment/Performance Deficits MODERATE - Comorbidities and min to mod modifications of tasks    Clinical Decision Making MODERATE - Analysis of occupational profile, detailed assessments, several treatment options    Overall Complexity LOW     OT Session Time: 30  minutes  Self-Care Home Management: 10 minutes  Therapeutic Exercise: 10 minutes

## 2024-03-01 NOTE — CONSULTS
[facility] H&P/Consult note       CC: post op med management  Asked to see pt by Dr Garcia     PCP: Coco Du DO    History of Present Illness: Patient is a 63 year old female with PMH sig for htn, hld, dm2, gerd, here for scheduled shoulder surgery    Post op pain is controlled. No n/v. Felt dizzy first time she got up but none since then. Asking when she could go home    PMH  Past Medical History:   Diagnosis Date    Acute subdural hematoma (HCC) 01/2024    per pt currently resolved    Anxiety state     Back pain     Back problem     Depression     Diabetes (HCC)     High blood pressure     High cholesterol     Hypokalemia     Hypomagnesemia     Insomnia     Neuropathy     Osteoarthritis     Other and unspecified hyperlipidemia     Unspecified essential hypertension     Visual impairment     glasses        PSH  Past Surgical History:   Procedure Laterality Date    ANESTH,SHOULDER REPLACEMENT      right    ARTHROTOMY,OPEN REPAIR MENISCUS Left 01/13/2020    BACK SURGERY  2000/2001    laminectomy L4-5-S1    CHOLECYSTECTOMY      EXCIS LUMBAR DISK,ONE LEVEL  2015    redo L5-S1 discectomy    HYSTERECTOMY  2004    partial    OTHER      AVM malformation with coil put in place right side of chest     SPINE SURGERY PROCEDURE UNLISTED      TOTAL KNEE REPLACEMENT Bilateral 05/2022    R Knee Replacement        ALL:  Allergies   Allergen Reactions    Oxycodone NAUSEA ONLY, DIZZINESS and FACE FLUSHING     Nausea, dizziness          Home Medications:  Outpatient Medications Marked as Taking for the 2/29/24 encounter (Hospital Encounter)   Medication Sig Dispense Refill    HYDROcodone-acetaminophen (NORCO)  MG Oral Tab Take 1 tablet by mouth every 6 (six) hours as needed for Pain. 20 tablet 0    predniSONE 20 MG Oral Tab Take 1 tablet (20 mg total) by mouth daily.      Meloxicam 15 MG Oral Tab Take 1 tablet (15 mg total) by mouth daily.      cyanocobalamin 1000 MCG Oral Tab Take 0.5 tablets (500 mcg total) by mouth  daily.      traMADol 50 MG Oral Tab Take 1 tablet (50 mg total) by mouth every 6 (six) hours as needed for Pain.      Benazepril HCl 40 MG Oral Tab Take 1 tablet (40 mg total) by mouth daily.      metoprolol succinate ER 25 MG Oral Tablet 24 Hr Take 1 tablet (25 mg total) by mouth at bedtime.      Fenofibrate 160 MG Oral Tab Take 1 tablet (160 mg total) by mouth daily.      ALPRAZolam 0.5 MG Oral Tab Take 1 tablet (0.5 mg total) by mouth nightly as needed for Sleep or Anxiety.      busPIRone 10 MG Oral Tab Take 1 tablet (10 mg total) by mouth in the morning and 1 tablet (10 mg total) before bedtime.      atorvastatin 40 MG Oral Tab Take 1 tablet (40 mg total) by mouth nightly. 30 tablet 2    ARIPiprazole 5 MG Oral Tab Take 1 tablet (5 mg total) by mouth at bedtime.      TRAZODONE 150 MG Oral Tab Take 1 tablet (150 mg total) by mouth nightly. 90 tablet 0    pregabalin 50 MG Oral Cap Take 1 capsule (50 mg total) by mouth 3 (three) times daily. (Patient taking differently: Take 1 capsule (50 mg total) by mouth 2 (two) times daily. 50MG IN AM AND 100MG PM) 90 capsule 1    venlafaxine 75 MG Oral Capsule SR 24 Hr Take 3 capsules (225 mg total) by mouth daily. 270 capsule 1         Soc Hx  Social History     Tobacco Use    Smoking status: Former     Packs/day: 1.00     Years: 10.00     Additional pack years: 0.00     Total pack years: 10.00     Types: Cigarettes     Quit date: 3/25/2000     Years since quittin.9     Passive exposure: Never    Smokeless tobacco: Never   Substance Use Topics    Alcohol use: No     Comment: rare        Fam Hx  Family History   Problem Relation Age of Onset    Cancer Father 79        Lung cancer    Cancer Son     Other (Other) Son         MVA    Cancer Maternal Grandmother         Skin cancer    Other (Other) Maternal Grandfather         TB    Heart Disorder Paternal Grandmother 75        Heart attack    Psychiatric Sister         Depression    Breast Cancer Sister 65        sister at age  of 65    Heart Disorder Brother 56        Triple bypass    Hypertension Brother        Review of Systems  Comprehensive ROS reviewed and negative except for what's stated above.         OBJECTIVE:  /70 (BP Location: Right arm)   Pulse 85   Temp 99.3 °F (37.4 °C) (Oral)   Resp 16   Ht 5' 9\" (1.753 m)   Wt 221 lb (100.2 kg)   SpO2 96%   BMI 32.64 kg/m²   General:  Alert, no distress, appears stated age.   Head:  Normocephalic,    Eyes:  Sclera anicteric, No conjunctival pallor, EOMs intact.    Throat: MMM   Neck: Supple    Lungs:   Clear to auscultation bilaterally. Normal effort   Chest wall:  No tenderness or deformity.   Heart:  Regular rate and rhythm    Abdomen:   Soft, NT/ND    Extremities: Atraumatic,  left shoulder in sling   Skin: No visible rashes or lesions.    Neurologic: Normal strength, no focal deficit appreciated     Diagnostic Data:    CBC/Chem  Recent Labs   Lab 03/01/24  0554   HGB 11.3*       No results for input(s): \"NA\", \"K\", \"CL\", \"CO2\", \"BUN\", \"CREATSERUM\", \"GLU\", \"CA\", \"CAION\", \"MG\", \"PHOS\" in the last 168 hours.    No results for input(s): \"ALT\", \"AST\", \"ALB\", \"AMYLASE\", \"LIPASE\", \"LDH\" in the last 168 hours.    Invalid input(s): \"ALPHOS\", \"TBIL\", \"DBIL\", \"TPROT\"    No results for input(s): \"TROP\" in the last 168 hours.         Radiology: XR SHOULDER, (1 VIEW), LEFT (CPT=73020)    Result Date: 2/29/2024  PROCEDURE:  XR SHOULDER, (1 VIEW), LEFT (CPT=73020)  TECHNIQUE:  AP view of the shoulder was obtained.  COMPARISON:  [facility] , XR, XR SHOULDER, COMPLETE (MIN 2 VIEWS), LEFT (CPT=73030), 2/16/2024, 2:19 PM.  INDICATIONS:  Left shoulder arthroplasty  PATIENT STATED HISTORY: (As transcribed by Technologist)  Patient offered no additional history at this time.    FINDINGS:  OTHER:  Single-view of the left shoulder demonstrates recent postsurgical changes of a shoulder arthroplasty.  Alignment appears within normal limits.  Osseous structures are intact.            CONCLUSION:  1.  Status post left shoulder arthroplasty.   LOCATION:  [facility]   Dictated by (CST): Chantal Matta MD on 2/29/2024 at 4:50 PM     Finalized by (CST): Chantal Matta MD on 2/29/2024 at 4:51 PM       MRI BRAIN (CPT=70551)    Result Date: 2/18/2024  PROCEDURE:  MRI BRAIN (CPT=70551)  COMPARISON:  [facility] , CT, CTA BRAIN + CTA CAROTIDS (CPT=70496/73916), 2/16/2024, 6:39 PM.  INDICATIONS:  cognitive impairment and gait balance problem  TECHNIQUE:  MRI of the brain was performed with multi-planar T1, T2-weighted images with FLAIR sequences and diffusion weighted images without infusion.  PATIENT STATED HISTORY: (As transcribed by Technologist)  Patient states she fell 3 weeks ago hitting head. Cognitive impairment and gait balance problem.    FINDINGS:  INTRACRANIAL:  Diffuse cerebral and cerebellar atrophy.  There is no restricted diffusion to suggest acute intracranial infarction.  A few scattered foci of increased FLAIR and T2 signal in the periventricular and subcortical white matter both cerebral hemispheres consistent with mild chronic small vessel ischemic changes of aging.  No midline shift.  No mass effect.  No susceptibility artifact to suggest hemorrhage.  VENTRICLES/SULCI:   No hydrocephalus.  SINUSES/ORBITS:       The visualized paranasal sinuses are clear.  The orbits are unremarkable. MASTOIDS:      The mastoids are clear.            CONCLUSION:  There is no MR evidence for acute intracranial infarction.  Mild diffuse cerebral and cerebellar atrophy with chronic microvascular ischemic changes of aging.   LOCATION:  [facility]   Dictated by (CST): Shyla Caldwell MD on 2/18/2024 at 1:20 PM     Finalized by (CST): Shyla Caldwell MD on 2/18/2024 at 1:26 PM       XR CHEST PA + LAT CHEST (CPT=71046)    Result Date: 2/17/2024  PROCEDURE:  XR CHEST PA + LAT CHEST (CPT=71046)  INDICATIONS:  possible pna on imaging  COMPARISON:  [facility], XR, XR CHEST AP PORTABLE  (CPT=71045), 2/12/2024, 10:43 PM.  [facility] , XR, XR CHEST AP  PORTABLE  (CPT=71045), 2/16/2024, 2:18 PM.  TECHNIQUE:  PA and lateral chest radiographs were obtained.  PATIENT STATED HISTORY: (As transcribed by Technologist)  Patient states chest congestion today. Denies cough.    FINDINGS:  LUNGS:  There is a calcified granuloma within the left lower lobe.  There is coils within the right middle lobe from prior embolization.  The small focal airspace opacity in the lingula is stable may represent an area of atelectasis or pneumonia. CARDIAC:  Normal size cardiac silhouette. MEDIASTINUM:  Normal. PLEURA:  Normal.  No pleural effusions. BONES:  Normal for age.            CONCLUSION:  Small focal airspace opacity within the lateral lingula may represent a focus of pneumonia or atelectasis.   LOCATION:  [facility]   Dictated by (CST): Reji Garner MD on 2/17/2024 at 11:26 AM     Finalized by (CST): Reji Garner MD on 2/17/2024 at 11:27 AM       CTA BRAIN + CTA CAROTIDS (CPT=70496/84168)    Result Date: 2/16/2024  PROCEDURE:  CTA BRAIN + CTA CAROTIDS (CPT=70496/66177)  COMPARISON:  [facility] , CT, CT BRAIN OR HEAD (20389), 2/15/2024, 3:14 PM.  INDICATIONS:  fall, landed on left shoulder. fatigue & dizzy. No LOC. not on a blood thinner. seen here yesterday for stroke like symptoms  TECHNIQUE:  CT angiography of the head and neck were obtained with non-ionic contrast.  3D volume rendering images were obtained by the technologist as well as the radiologist on an independent workstation.  Multiplanar 3D reformatted imaging including multiplanar MIP images were obtained.  Curved planar reformats were performed through the carotid and vertebral arteries. All measurements obtained in this exam were performed using NASCET criteria.  Dose reduction techniques were used. Dose information is transmitted to the ACR (American College of Radiology) NRDR (National Radiology Data Registry) which includes the Dose Index Registry.  PATIENT STATED HISTORY:(As transcribed by Technologist)  Patient fell and  landed on left side, altered mental status, fatigue.   CONTRAST USED:  75cc of Isovue 370  FINDINGS:   HEAD:  No intracranial hemorrhage, mass effect, or acute large vessel transcortical infarct.  Diffuse sulcal prominence concordant with age.  Gray-white differentiation is preserved.  No hydrocephalus.  Basilar cisterns are patent.  Minimal intracranial atherosclerosis.  No pathologic parenchymal or meningeal enhancement.  Paranasal sinuses and mastoid air cells are clear.  No depressed calvarial fracture.  No scalp hematoma.  ANGIOGRAM:  NECK:  Patency of the aortic arch and origins of the great vessels.  Patency of both vertebral arteries without significant stenosis.  Right common carotid artery is patent.  Calcifications and soft tissue plaque at the right carotid bifurcation and involving the proximal cervical internal carotid artery without evidence of hemodynamically significant stenosis (less than 50 percent per NASCET criteria).  Right cervical internal carotid artery is patent.  Right external carotid artery is patent.  Left common carotid artery is patent.  Calcifications and soft tissue plaque at the left carotid bifurcation and involving the proximal cervical internal carotid artery without evidence of hemodynamically significant stenosis (less than 50 percent per NASCET criteria).  Left cervical internal carotid artery is patent.  Left external carotid artery is patent.  BRAIN:  There is patency of the anterior circulation including the bilateral intracranial internal carotid arteries, and the A1 and M1 segments of the anterior and middle cerebral arteries.  There is patency of the posterior circulation including the bilateral distal vertebral arteries, basilar artery, and P1 segments of the posterior cerebral arteries.  There is no focal saccular aneurysm.  Please note CTA is less sensitive for aneurysms less than 3 mm in size.  INCIDENTAL:  Multiple subcentimeter hypodense nodules of the left  thyroid lobe noted.             CONCLUSION:  1. No large vessel occlusion, hemodynamically significant stenosis, dissection or aneurysm of the major arterial vasculature of the head or neck.  2. No acute intracranial process.  3. Subcentimeter hypodense nodules of the left thyroid lobe, best assessed with thyroid ultrasound.    LOCATION:  [facility]   Dictated by (CST): Jose Luis Garcia MD on 2/16/2024 at 7:18 PM     Finalized by (CST): Jose Luis Garcia MD on 2/16/2024 at 7:27 PM       XR SHOULDER, COMPLETE (MIN 2 VIEWS), LEFT (CPT=73030)    Result Date: 2/16/2024  PROCEDURE:  XR SHOULDER, COMPLETE (MIN 2 VIEWS), LEFT (CPT=73030)  TECHNIQUE:  Multiple views were obtained.  COMPARISON:  [facility], XR, XR CHEST AP PORTABLE  (CPT=71045), 2/12/2024, 10:43 PM.  [facility] , XR, XR CHEST AP PORTABLE  (CPT=71045), 12/03/2023, 5:37 PM.  [facility], XR, XR SHOULDER, COMPLETE (MIN 2 VIEWS), LEFT (CPT=73030), 7/27/2022, 8:05 PM.  [facility]  , XR, XR CHEST AP PORTABLE  (CPT=71045), 2/16/2024, 2:18 PM.  INDICATIONS:  fall, landed on left shoulder. fatigue & dizzy. No LOC. not on a blood thinner. seen here yesterday for stroke like symptoms  PATIENT STATED HISTORY: (As transcribed by Technologist)  Patient fell and landed on her left shoulder. Patient is due to have left shoulder surgery in two weeks to fix a torn rotator cuff.             CONCLUSION:  There is AC joint hypertrophy with lateral downsloping of the acromion.  Scapular view is rotated limiting evaluation.  No definite shoulder dislocation or acute fracture.  There is high-riding humeral head in relation the glenoid concerning for possible impingement.   LOCATION:  [id number]   Dictated by (CST): Shyla Caldwell MD on 2/16/2024 at 2:51 PM     Finalized by (CST): Shyla Caldwell MD on 2/16/2024 at 2:54 PM       XR CHEST AP PORTABLE  (CPT=71045)    Result Date: 2/16/2024  PROCEDURE:  XR CHEST AP PORTABLE  (CPT=71045)  TECHNIQUE:  AP chest radiograph was obtained.  COMPARISON:  [facility], XR,  XR CHEST AP PORTABLE  (CPT=71045), 2/12/2024, 10:43 PM.  INDICATIONS:  fall, landed on left shoulder. fatigue & dizzy. No LOC. not on a blood thinner. seen here yesterday for stroke like symptoms  PATIENT STATED HISTORY: (As transcribed by Technologist)  Patient fell and landed on her left shoulder.              CONCLUSION:  Focal infiltrate in the left lung base.  Mild increase in the cardiac silhouette pulmonary vascularity upper limits of  Increased interstitial markings throughout unchanged.  Postsurgical changes right shoulder.   LOCATION:  [id number]      Dictated by (CST): Shyla Caldwell MD on 2/16/2024 at 2:49 PM     Finalized by (CST): Shyla Caldwell MD on 2/16/2024 at 2:51 PM       CT BRAIN OR HEAD (46561)    Result Date: 2/15/2024  PROCEDURE:  CT BRAIN OR HEAD (15333)  COMPARISON:  [facility], CT, CT BRAIN OR HEAD ([id number]), 2/12/2024, 10:06 PM.  INDICATIONS:  AMS  TECHNIQUE:  Noncontrast CT scanning is performed through the brain. Dose reduction techniques were used. Dose information is transmitted to the ACR (American College of Radiology) NRDR (National Radiology Data Registry) which includes the Dose Index Registry.  PATIENT STATED HISTORY: (As transcribed by Technologist)  Patient with altered mental status.    FINDINGS:  Ventricles and sulci are within normal limits.  Trace chronic small vessel ischemic disease.  There is no midline shift or mass-effect.  The basal cisterns are patent.  The gray-white matter differentiation is intact.  There is no acute intracranial hemorrhage or extra-axial fluid collection.   There is no evident fracture.  The visualized paranasal sinuses and mastoid air cells are unremarkable.            CONCLUSION:  1. No acute intracranial hemorrhage or hydrocephalus. 2. Trace chronic small vessel ischemic disease. If there is clinical concern for acute ischemia/infarction, an MRI of the brain would be recommended for further evaluation.    LOCATION:  [facility]   Dictated by (CST):  Stromberg, LeRoy, MD on 2/15/2024 at 3:26 PM     Finalized by (CST): Stromberg, LeRoy, MD on 2/15/2024 at 3:28 PM       XR CHEST AP PORTABLE  (CPT=71045)    Result Date: 2/12/2024  PROCEDURE:  XR CHEST AP PORTABLE  (CPT=71045)  TECHNIQUE:  AP chest radiograph was obtained.  COMPARISON:  [facility] , XR, XR CHEST AP PORTABLE  (CPT=71045), 12/03/2023, 5:37 PM.  [facility] , XR, XR CHEST AP PORTABLE  (CPT=71045), 6/07/2023, 2:32 PM.  INDICATIONS:  hypertension, last bp 186/110. stomach pain  PATIENT STATED HISTORY: (As transcribed by Technologist)  Pt c/o mid anterior stomach pain since 2/7/24, she denies any other chest complications. Pt has hx of coil in right lung and hx of gallbladder removal.    FINDINGS:  Cardiac size and pulmonary vasculature are within normal limits. No pleural effusions. No pneumothorax.  Calcified granuloma.  Right shoulder arthroplasty.             CONCLUSION:  No acute pulmonary findings.   LOCATION:  [facility]      Dictated by (CST): Jeni Celeste MD on 2/12/2024 at 10:54 PM     Finalized by (CST): Jeni Celeste MD on 2/12/2024 at 10:54 PM       CT BRAIN OR HEAD ([id number])    Result Date: 2/12/2024  PROCEDURE:  CT BRAIN OR HEAD (50667)  COMPARISON:  [facility] , CT, CT BRAIN OR HEAD ([id number]), 1/23/2024, 8:38 AM.  [facility], CT, CT BRAIN OR HEAD (02632), 1/22/2024, 3:23 PM.  INDICATIONS:  hypertension, last bp 186/110. stomach pain  TECHNIQUE:  Noncontrast CT scanning is performed through the brain. Dose reduction techniques were used. Dose information is transmitted to the ACR (American College of Radiology) NRDR (National Radiology Data Registry) which includes the Dose Index Registry.  PATIENT STATED HISTORY: (As transcribed by Technologist)  hypertension and unable to sleep the past 3 days. patient stated she fell a few weeks ago and is feeling dizzy on occasion.    FINDINGS:  The ventricles are symmetric bilaterally. There are patchy areas of low attenuation in the periventricular and deep white  matter which are nonspecific but most consistent with small vessel ischemic changes. There is no evidence of hemorrhage, mass, midline shift, or extra-axial fluid collection.  The visualized portions of the orbits are normal.  The visualized paranasal sinuses show no significant sinus disease.. No evidence of depressed skull fracture.            CONCLUSION: 1. No acute intracranial findings 2. Chronic microvascular ischemic changes    LOCATION:  [facility]   Dictated by (CST): Jeni Celeste MD on 2/12/2024 at 10:39 PM     Finalized by (CST): Jeni Celeste MD on 2/12/2024 at 10:40 PM          ASSESSMENT / PLAN:     Patient is a 63 year old female with PMH sig for htn, hld, dm2, gerd, recent fall and sdh, here for schedule TSA    Impression    Sp reverse TSA 02/29/24  Post op pain    -HTN  -HLD  -DM 2  -GERD    Plan    *sp TSA  -post op surgical management per ortho    *HTN  -controlled, cont home meds    *HLD  -statin    *Dm2  -home meds at dc    *gerd   -per chart  Will rx on ppi as pt is on steroids (recenlty rx by ortho ) and prn nsaids.       Dispo -if does well with PT OT can dc later today      Further recommendations pending patient's clinical course. [facility] hospitalist to continue to follow patient while in house    Patient and/or patient's family given opportunity to ask questions and note understanding and agreeing with therapeutic plan as outlined    [facility] Hospitalist  [phone]  Answering Service: [phone]

## 2024-03-01 NOTE — PROGRESS NOTES
[facility]  Progress Note    Shalonda Batista Patient Status:  Outpatient in a Bed    1960 MRN [id number]   Location [facility] 3SW-A Attending Jimi Garcia MD   Hosp Day # 0 PCP Coco Du DO     Subjective:  Shalonda Batista is a(n) 63 year old female. Block wearing off, pain controlled with PO meds    Objective/Physical Exam:  General: Alert, orientated x3.  Cooperative.  No apparent distress.  Vital Signs:  Blood pressure (!) 155/99, pulse 77, temperature 98.7 °F (37.1 °C), temperature source Oral, resp. rate 16, height 5' 9\" (1.753 m), weight 221 lb (100.2 kg), SpO2 91%, not currently breastfeeding.    Extremities:  No lower extremity edema noted.  Without clubbing or cyanosis.  L UE - moving fingers, sensation and motor function returning after block, dressings intact      Labs:         Assessment/Plan:  Patient Active Problem List   Diagnosis    History of back surgery    Postlaminectomy syndrome, lumbar region    Chest pain    Essential hypertension    IFG (impaired fasting glucose)    Hypertriglyceridemia    Right total knee arthroplasty  Global 2020    Effusion of right knee    Osteonecrosis of right knee region (HCC)    Acute medial meniscal tear, right, subsequent encounter - possible    Posterior knee pain, right    Right calf pain    Insufficiency fracture of medial femoral condyle (HCC)    Derangement of posterior horn of medial meniscus of right knee    Dizziness    Other closed nondisplaced fracture of proximal end of right humerus with routine healing, subsequent encounter    Hypokalemia    Recurrent falls    Acute pain of right shoulder    Right ear pain    Ceruminosis, right    Complex tear of medial meniscus of left knee as current injury, subsequent encounter    Orthopedic aftercare    Osteoarthritis of glenohumeral joint, right    Status post arthroscopic surgery of left knee    Numbness and tingling in right hand    Injury of right shoulder, initial encounter     Acute postoperative pain of right shoulder    Posterior tibial tendon dysfunction (PTTD) of left lower extremity    BMI 38.0-38.9,adult    Gastroesophageal reflux disease without esophagitis    Acute chest pain    Pedal edema    Peripheral polyneuropathy    Severe obesity (BMI 35.0-39.9) with comorbidity (HCC)    Fatty liver disease, nonalcoholic    Chronic insomnia    Anxiety    Panic disorder    Moderate episode of recurrent major depressive disorder (HCC)    Primary osteoarthritis of left knee    B12 deficiency    Unstable angina (HCC)    Hypomagnesemia    Diarrhea, unspecified type    Dehydration    Hypernatremia    Acute subdural hematoma (HCC)    Fall, initial encounter    Injury of head, initial encounter    Laceration of scalp, initial encounter    Altered mental status, unspecified altered mental status type    Gait abnormality    Memory loss       L shoulder cuff tear arthropathy    S/P reverse TSA  Pain control  PT/OT  OK for discharge from ortho standpoint once cleared by other services    Time spent on counseling/coordination of care:  15 Minutes    Total time spent with patient:  15 Minutes    Jimi Garcia MD  3/1/2024  6:23 AM

## 2024-03-01 NOTE — DISCHARGE INSTRUCTIONS
Dr. TAINA Garcia:  Total Shoulder: discharge instructions    Activity  Bathing                                                                No tub baths until cleared by surgeon.  If allowed to shower:                                                               Ok to let water to run over the incision area      IF AQUACEL DRESSING                                         Leave in place. To be changed in 7 days   Then follow directions under Incision Care/Dressing changes                                                                                                                                                                                                      Driving  Do not drive until cleared by physician at follow-up office visit. This is usually four weeks after surgery.  Not allowed while taking narcotic pain medication or muscle relaxants.      Return to work  Usually allowed after four to six weeks. Discuss specific work activities with your surgeon.    Restrictions/Exercises  For shoulder replacement surgery, follow instructions provided by physical therapy and your surgeon.   Pendulum exercises as described in Physical Therapy sheet    Sling Instructions  Sling for comfort  Arm in sling with hand level with elbow, along with the wrist and  hand inside the sling  DO NOT rotate your arm out to the side.  May use a pillow under elbow while sitting in chair for comfort and support.    No smoking  Avoid smoking. Known to cause breathing problems and can decrease the rate of healing.     Discomfort  Surgical discomfort is normal for one to two months.  Take pain medication as prescribed with food, allowing 30-45 minutes to take effect.  Do not drink alcohol while on pain medication.  Keep pain manageable; pain should not disrupt your sleep or activities like getting out of bed or walking.  As you have less discomfort, decrease the amount of pain medication you take. Use plain Tylenol for less severe  pain.  Use ice therapy or polar care to surgical site for 20 minutes at least three times a day. Be sure there is a thin cloth barrier between skin and ice or cold therapy.  Change position at least every 45 minutes while awake to avoid stiffness or increased discomfort.  Have realistic goals and keep a positive outlook.  Deep breathing and relaxation techniques can help.  Contact physician if discomfort does not respond to pain medication      Incision care/Dressing changes  Wash hands before and after dressing changes.  If you have the Aquacel dressing, this is to stay on the incision until 7 days after surgery  This will be removed by your home health nurse or the nursing staff at the [facility].         Then this will be changed to a DRY GAUZE DRESSINGS:    Change dressing daily using dry sterile gauze and paper tape. Continue this until you follow up with your surgeon.  Remove entire wrapping and old dressing (if gauze)  Change after showering.   Pat dry if necessary and cover incision with dry sterile gauze and paper tape. For other types of dressings, follow surgeon’s orders.                                                               There could be a small amount of redness around the staples or incision; this is normal.  Watch for increased redness, warmth, any odor, increased drainage or opening of the incision.  Call physician if you notice these changes.  Sutures/staples will be removed at first office visit      (10 days to 2 weeks after surgery)                                                          Body changes  Constipation - common with the use of narcotics.  Use stool softeners such as Colace or Senokot while on narcotics, and laxatives if needed. Eat fiber rich foods and drink plenty of fluids, water is a choice liquid.  An enema may be needed if above measures do not work    Prevention of infection and promotion of healing  Good hand washing is important, along with frequent hand  washing.  Keep bed linen/clothing freshly laundered.  Do not allow others or pets to touch your incision.  Avoid people that have colds or the flu.  Your surgeon may recommend that you take antibiotics before you undergo any dental procedures after your joint replacement. Speak with your physician about this at your post-op office visit.  Eat a balanced diet high in fiber and drink plenty of fluids.   Continue using incentive spirometry  Check your temperature twice a day before taking pain medication, because there is Tylenol in most pain medicine, if as high as 101, drink some extra fluids, use your incentive spirometer for deep breathing, get up and walk around frequently over next 2 hours, recheck your temperature.    Post op Office visits  Follow up in 1 week with your Primary Care Physician  Dr. Garcia follow up appointment is to be Scheduled 10 days to 2 weeks after surgery  Additional visits may need to be scheduled. Your physician will discuss this at first post-op office visit.  Schedule outpatient physical therapy per your surgeon’s orders     Sex  Check with physician at your office visit.      Notify your surgeon if you notice any of the following signs  Separation of incision line.  Increased redness, swelling, or warmth of skin around incision.  Increased or foul smelling drainage from incision  Red streaks on skin near incision.  Temperature >101F, on 2 subsequent readings.  Increased pain at incision not relieved by pain medication.  Please contact Dr. TAINA Garcia before you come to the ER regarding the incisions appearance, Contact number is [phone].  Your surgeon’s office is available 24 hours a day 7 days a week. The answering service will direct your call to your surgeon, the on call surgeon or PA (physician’s assistant). This person will be in contact with you.    Signs of blood clot  Pain, excessive tenderness, redness, or swelling in leg or calf (other than incision site).  Call your  surgeon  Increased pain in either calf with walking      Go directly to the ER or CALL 911 if you:  become short of breath  have chest pain  cough up blood  have unexplained anxiety with breathing  these could be signs of a blood clot in your lungs called a Pulmonary Embolism  Special Instructions              BJ 6/16

## 2024-03-01 NOTE — PLAN OF CARE
Post-op day 1. Dressing is clean, dry, and intact with sling in place. Alert and oriented x4. Room air. Continuous pulse oximeter. Last bowel movement 2/28. Regular diet with accuchecks. Occupational therapy pending. Weightbearing as tolerated with cane. Lyrica addressed with attending per patient request. Deemed appropriate for discharge once medically stable.

## 2024-03-01 NOTE — PROGRESS NOTES
AVS reviewed, IV dc'd , discharge video viewed, will dc home , will follow-up w/ Dr. Garcia in 2 weeks .    1402: Opted to have scripts filled at own pharmacy , pat will be here at 530pm.

## 2024-03-05 ENCOUNTER — APPOINTMENT (OUTPATIENT)
Dept: CT IMAGING | Facility: HOSPITAL | Age: 64
End: 2024-03-05
Attending: EMERGENCY MEDICINE
Payer: MEDICARE

## 2024-03-05 ENCOUNTER — APPOINTMENT (OUTPATIENT)
Dept: GENERAL RADIOLOGY | Facility: HOSPITAL | Age: 64
End: 2024-03-05
Attending: EMERGENCY MEDICINE
Payer: MEDICARE

## 2024-03-05 ENCOUNTER — HOSPITAL ENCOUNTER (OUTPATIENT)
Facility: HOSPITAL | Age: 64
Setting detail: OBSERVATION
Discharge: HOME | End: 2024-03-06
Attending: EMERGENCY MEDICINE | Admitting: INTERNAL MEDICINE
Payer: MEDICARE

## 2024-03-05 DIAGNOSIS — R06.09 EXERTIONAL DYSPNEA: ICD-10-CM

## 2024-03-05 DIAGNOSIS — R07.89 CHEST PAIN, ATYPICAL: Primary | ICD-10-CM

## 2024-03-05 LAB
ALBUMIN SERPL-MCNC: 3.6 G/DL (ref 3.4–5)
ALBUMIN/GLOB SERPL: 1 {RATIO} (ref 1–2)
ALP LIVER SERPL-CCNC: 50 U/L
ALT SERPL-CCNC: 21 U/L
ANION GAP SERPL CALC-SCNC: 2 MMOL/L (ref 0–18)
APTT PPP: 21.3 SECONDS (ref 23.3–35.6)
AST SERPL-CCNC: 18 U/L (ref 15–37)
ATRIAL RATE: 69 BPM
BASOPHILS # BLD AUTO: 0.01 X10(3) UL (ref 0–0.2)
BASOPHILS NFR BLD AUTO: 0.2 %
BILIRUB SERPL-MCNC: 0.4 MG/DL (ref 0.1–2)
BUN BLD-MCNC: 17 MG/DL (ref 9–23)
CALCIUM BLD-MCNC: 9.5 MG/DL (ref 8.5–10.1)
CHLORIDE SERPL-SCNC: 109 MMOL/L (ref 98–112)
CO2 SERPL-SCNC: 24 MMOL/L (ref 21–32)
CREAT BLD-MCNC: 0.9 MG/DL
EGFRCR SERPLBLD CKD-EPI 2021: 71 ML/MIN/1.73M2 (ref 60–?)
EOSINOPHIL # BLD AUTO: 0.01 X10(3) UL (ref 0–0.7)
EOSINOPHIL NFR BLD AUTO: 0.2 %
ERYTHROCYTE [DISTWIDTH] IN BLOOD BY AUTOMATED COUNT: 12.1 %
GLOBULIN PLAS-MCNC: 3.5 G/DL (ref 2.8–4.4)
GLUCOSE BLD-MCNC: 125 MG/DL (ref 70–99)
GLUCOSE BLD-MCNC: 93 MG/DL (ref 70–99)
HCT VFR BLD AUTO: 35 %
HGB BLD-MCNC: 11.7 G/DL
IMM GRANULOCYTES # BLD AUTO: 0.02 X10(3) UL (ref 0–1)
IMM GRANULOCYTES NFR BLD: 0.3 %
INR BLD: 0.98 (ref 0.8–1.2)
LYMPHOCYTES # BLD AUTO: 1.3 X10(3) UL (ref 1–4)
LYMPHOCYTES NFR BLD AUTO: 19.8 %
MCH RBC QN AUTO: 29.8 PG (ref 26–34)
MCHC RBC AUTO-ENTMCNC: 33.4 G/DL (ref 31–37)
MCV RBC AUTO: 89.3 FL
MONOCYTES # BLD AUTO: 0.23 X10(3) UL (ref 0.1–1)
MONOCYTES NFR BLD AUTO: 3.5 %
NEUTROPHILS # BLD AUTO: 4.99 X10 (3) UL (ref 1.5–7.7)
NEUTROPHILS # BLD AUTO: 4.99 X10(3) UL (ref 1.5–7.7)
NEUTROPHILS NFR BLD AUTO: 76 %
NT-PROBNP SERPL-MCNC: 750 PG/ML (ref ?–125)
OSMOLALITY SERPL CALC.SUM OF ELEC: 283 MOSM/KG (ref 275–295)
P AXIS: 2 DEGREES
P-R INTERVAL: 142 MS
PLATELET # BLD AUTO: 388 10(3)UL (ref 150–450)
POTASSIUM SERPL-SCNC: 4.2 MMOL/L (ref 3.5–5.1)
PROT SERPL-MCNC: 7.1 G/DL (ref 6.4–8.2)
PROTHROMBIN TIME: 12.9 SECONDS (ref 11.6–14.8)
Q-T INTERVAL: 404 MS
QRS DURATION: 76 MS
QTC CALCULATION (BEZET): 432 MS
R AXIS: -16 DEGREES
RBC # BLD AUTO: 3.92 X10(6)UL
SARS-COV-2 RNA RESP QL NAA+PROBE: NOT DETECTED
SODIUM SERPL-SCNC: 135 MMOL/L (ref 136–145)
T AXIS: 37 DEGREES
TROPONIN I SERPL HS-MCNC: 5 NG/L
TROPONIN I SERPL HS-MCNC: 6 NG/L
VENTRICULAR RATE: 69 BPM
WBC # BLD AUTO: 6.6 X10(3) UL (ref 4–11)

## 2024-03-05 PROCEDURE — 93010 ELECTROCARDIOGRAM REPORT: CPT

## 2024-03-05 PROCEDURE — 82962 GLUCOSE BLOOD TEST: CPT

## 2024-03-05 PROCEDURE — 85610 PROTHROMBIN TIME: CPT | Performed by: EMERGENCY MEDICINE

## 2024-03-05 PROCEDURE — 71045 X-RAY EXAM CHEST 1 VIEW: CPT | Performed by: EMERGENCY MEDICINE

## 2024-03-05 PROCEDURE — 96372 THER/PROPH/DIAG INJ SC/IM: CPT

## 2024-03-05 PROCEDURE — 84484 ASSAY OF TROPONIN QUANT: CPT | Performed by: HOSPITALIST

## 2024-03-05 PROCEDURE — 83880 ASSAY OF NATRIURETIC PEPTIDE: CPT | Performed by: EMERGENCY MEDICINE

## 2024-03-05 PROCEDURE — 99285 EMERGENCY DEPT VISIT HI MDM: CPT

## 2024-03-05 PROCEDURE — 85730 THROMBOPLASTIN TIME PARTIAL: CPT | Performed by: EMERGENCY MEDICINE

## 2024-03-05 PROCEDURE — 36415 COLL VENOUS BLD VENIPUNCTURE: CPT

## 2024-03-05 PROCEDURE — 71275 CT ANGIOGRAPHY CHEST: CPT | Performed by: EMERGENCY MEDICINE

## 2024-03-05 PROCEDURE — 93005 ELECTROCARDIOGRAM TRACING: CPT

## 2024-03-05 PROCEDURE — 84484 ASSAY OF TROPONIN QUANT: CPT | Performed by: EMERGENCY MEDICINE

## 2024-03-05 PROCEDURE — 80053 COMPREHEN METABOLIC PANEL: CPT | Performed by: EMERGENCY MEDICINE

## 2024-03-05 PROCEDURE — 85025 COMPLETE CBC W/AUTO DIFF WBC: CPT | Performed by: EMERGENCY MEDICINE

## 2024-03-05 RX ORDER — FENOFIBRATE 134 MG/1
134 CAPSULE ORAL
Status: DISCONTINUED | OUTPATIENT
Start: 2024-03-06 | End: 2024-03-06

## 2024-03-05 RX ORDER — HYDROXYZINE 50 MG/1
50 TABLET, FILM COATED ORAL NIGHTLY PRN
Status: DISCONTINUED | OUTPATIENT
Start: 2024-03-05 | End: 2024-03-06

## 2024-03-05 RX ORDER — HEPARIN SODIUM 5000 [USP'U]/ML
5000 INJECTION, SOLUTION INTRAVENOUS; SUBCUTANEOUS EVERY 8 HOURS SCHEDULED
Status: DISCONTINUED | OUTPATIENT
Start: 2024-03-05 | End: 2024-03-06

## 2024-03-05 RX ORDER — PREDNISONE 20 MG/1
20 TABLET ORAL DAILY
Status: DISCONTINUED | OUTPATIENT
Start: 2024-03-06 | End: 2024-03-06

## 2024-03-05 RX ORDER — ALPRAZOLAM 0.25 MG/1
0.5 TABLET ORAL NIGHTLY PRN
Status: DISCONTINUED | OUTPATIENT
Start: 2024-03-05 | End: 2024-03-06

## 2024-03-05 RX ORDER — SODIUM CHLORIDE 9 MG/ML
INJECTION, SOLUTION INTRAVENOUS CONTINUOUS
Status: ACTIVE | OUTPATIENT
Start: 2024-03-05 | End: 2024-03-05

## 2024-03-05 RX ORDER — HYDROCODONE BITARTRATE AND ACETAMINOPHEN 10; 325 MG/1; MG/1
1 TABLET ORAL EVERY 6 HOURS PRN
Status: DISCONTINUED | OUTPATIENT
Start: 2024-03-05 | End: 2024-03-06

## 2024-03-05 RX ORDER — ARIPIPRAZOLE 5 MG/1
5 TABLET ORAL NIGHTLY
Status: DISCONTINUED | OUTPATIENT
Start: 2024-03-05 | End: 2024-03-06

## 2024-03-05 RX ORDER — VENLAFAXINE HYDROCHLORIDE 75 MG/1
225 CAPSULE, EXTENDED RELEASE ORAL DAILY
Status: DISCONTINUED | OUTPATIENT
Start: 2024-03-06 | End: 2024-03-06

## 2024-03-05 RX ORDER — ACETAMINOPHEN 325 MG/1
650 TABLET ORAL EVERY 6 HOURS PRN
Status: DISCONTINUED | OUTPATIENT
Start: 2024-03-05 | End: 2024-03-06

## 2024-03-05 RX ORDER — ASPIRIN 81 MG/1
324 TABLET, CHEWABLE ORAL ONCE
Status: COMPLETED | OUTPATIENT
Start: 2024-03-05 | End: 2024-03-05

## 2024-03-05 RX ORDER — PANTOPRAZOLE SODIUM 40 MG/1
40 TABLET, DELAYED RELEASE ORAL
Status: DISCONTINUED | OUTPATIENT
Start: 2024-03-06 | End: 2024-03-06

## 2024-03-05 RX ORDER — ATORVASTATIN CALCIUM 40 MG/1
40 TABLET, FILM COATED ORAL NIGHTLY
Status: DISCONTINUED | OUTPATIENT
Start: 2024-03-05 | End: 2024-03-06

## 2024-03-05 RX ORDER — PREGABALIN 25 MG/1
50 CAPSULE ORAL 3 TIMES DAILY
Status: DISCONTINUED | OUTPATIENT
Start: 2024-03-05 | End: 2024-03-06

## 2024-03-05 RX ORDER — METOPROLOL SUCCINATE 25 MG/1
25 TABLET, EXTENDED RELEASE ORAL NIGHTLY
Status: DISCONTINUED | OUTPATIENT
Start: 2024-03-05 | End: 2024-03-06

## 2024-03-05 RX ORDER — PANTOPRAZOLE SODIUM 40 MG/1
40 TABLET, DELAYED RELEASE ORAL
Status: DISCONTINUED | OUTPATIENT
Start: 2024-03-06 | End: 2024-03-05

## 2024-03-05 RX ORDER — LISINOPRIL 40 MG/1
40 TABLET ORAL DAILY
Status: DISCONTINUED | OUTPATIENT
Start: 2024-03-06 | End: 2024-03-06

## 2024-03-05 RX ORDER — BUSPIRONE HYDROCHLORIDE 5 MG/1
10 TABLET ORAL 2 TIMES DAILY
Status: DISCONTINUED | OUTPATIENT
Start: 2024-03-05 | End: 2024-03-06

## 2024-03-05 RX ORDER — ONDANSETRON 2 MG/ML
4 INJECTION INTRAMUSCULAR; INTRAVENOUS EVERY 6 HOURS PRN
Status: DISCONTINUED | OUTPATIENT
Start: 2024-03-05 | End: 2024-03-06

## 2024-03-05 RX ORDER — TRAMADOL HYDROCHLORIDE 50 MG/1
50 TABLET ORAL EVERY 6 HOURS PRN
Status: DISCONTINUED | OUTPATIENT
Start: 2024-03-05 | End: 2024-03-06

## 2024-03-05 NOTE — H&P
[facility]   part of [facility]    History and Physical     Shalonda Batista Patient Status:  Emergency    1960 MRN [id number]   Location [facility] [place] EMERGENCY DEPARTMENT Attending Jorge Garcia MD   Hosp Day # 0 PCP Coco Du DO     Chief Complaint:   Chief Complaint   Patient presents with    Postop/Procedure Problem    Difficulty Breathing       History of Present Illness: Shalonda Batista is a 63 year old female with DM2, HTN, HL, nonobstructive CAD (40% RCA lesion) s/p L Shoulder Arthoplasty 6 days PTA who presented for evaluation of positional dyspnea, epigastric/sternal discomfort, worse when supine, that has been worsening since discharge home.     On arrival to the ED she was afebrile, hemodynamically stable & saturating well on room air. Labs were generally unremarkable save for mildly elevated pBNP to 750. Initial troponin x2 were negative & EKG was without acute ischemic changes. CT Chest was likewise negative for acute pathology. Cardiology was consulted & she was admitted for further evaluation.     Following admission she denies any new or worsening symptoms.     Past Medical History:  Past Medical History:   Diagnosis Date    Acute subdural hematoma (HCC) 2024    per pt currently resolved    Anxiety state     Back pain     Back problem     Depression     Diabetes (HCC)     High blood pressure     High cholesterol     Hypokalemia     Hypomagnesemia     Insomnia     Neuropathy     Osteoarthritis     Other and unspecified hyperlipidemia     Unspecified essential hypertension     Visual impairment     glasses        Past Surgical History:   Past Surgical History:   Procedure Laterality Date    ANESTH,SHOULDER REPLACEMENT      right    ARTHROTOMY,OPEN REPAIR MENISCUS Left 2020    BACK SURGERY      laminectomy L4-5-S1    CHOLECYSTECTOMY      EXCIS LUMBAR DISK,ONE LEVEL  2015    redo L5-S1 discectomy    HYSTERECTOMY  2004    partial    OTHER      AVM  malformation with coil put in place right side of chest     SPINE SURGERY PROCEDURE UNLISTED      TOTAL KNEE REPLACEMENT Bilateral 2022    R Knee Replacement       Social History:  reports that she quit smoking about 23 years ago. Her smoking use included cigarettes. She has a 10 pack-year smoking history. She has never been exposed to tobacco smoke. She has never used smokeless tobacco. She reports that she does not drink alcohol and does not use drugs.    Family History:   Family History   Problem Relation Age of Onset    Cancer Father 79        Lung cancer    Cancer Son     Other (Other) Son         MVA    Cancer Maternal Grandmother         Skin cancer    Other (Other) Maternal Grandfather         TB    Heart Disorder Paternal Grandmother 75        Heart attack    Psychiatric Sister         Depression    Breast Cancer Sister 65        sister at age of 65    Heart Disorder Brother 56        Triple bypass    Hypertension Brother        Allergies:   Allergies   Allergen Reactions    Oxycodone NAUSEA ONLY, DIZZINESS and FACE FLUSHING     Nausea, dizziness         Medications:    Current Facility-Administered Medications on File Prior to Encounter   Medication Dose Route Frequency Provider Last Rate Last Admin    [COMPLETED] ceFAZolin (Ancef) 2 g in 20mL IV syringe premix  2 g Intravenous Once Jimi Garcia MD   2 g at 24 1325    [] diphenhydrAMINE (Benadryl) 50 mg/mL  injection 25 mg  25 mg Intravenous Once PRN Jimi Garcia MD        [COMPLETED] ceFAZolin (Ancef) 2 g in 20mL IV syringe premix  2 g Intravenous Q8H Jimi Garcia MD   2 g at 24 0530    [COMPLETED] cyanocobalamin (Vitamin B12) 1000 MCG/ML injection 1,000 mcg  1,000 mcg Intramuscular Once Helio Davila DO   1,000 mcg at 24 1105    [COMPLETED] ALPRAZolam (Xanax) tab 1 mg  1 mg Oral Once Helio Davila, DO   1 mg at 24 1153    [COMPLETED] sodium chloride 0.9 % IV bolus 1,000 mL  1,000 mL Intravenous Once  Lv Bird MD   Stopped at 24 1620    [] ondansetron (Zofran) 4 MG/2ML injection 4 mg  4 mg Intravenous Q4H PRN Lv Bird MD        [COMPLETED] iopamidol 76% (ISOVUE-370) injection for power injector  75 mL Intravenous ONCE PRN Lv Bird MD   75 mL at 24 1845    [] ondansetron (Zofran) 4 MG/2ML injection 4 mg  4 mg Intravenous Q4H PRN Lv Bird MD        [COMPLETED] aspirin chewable tab 81 mg  81 mg Oral Once Lv Bird MD   81 mg at 24 2030    [COMPLETED] sodium chloride 0.9 % IV bolus 1,000 mL  1,000 mL Intravenous Once Vish Jordan MD   Stopped at 02/15/24 1657     Current Outpatient Medications on File Prior to Encounter   Medication Sig Dispense Refill    HYDROcodone-acetaminophen (NORCO)  MG Oral Tab Take 1 tablet by mouth every 6 (six) hours as needed for Pain. 20 tablet 0    pantoprazole 40 MG Oral Tab EC Take 1 tablet (40 mg total) by mouth every morning before breakfast. 10 tablet 0    pregabalin 50 MG Oral Cap Take 1 capsule (50 mg total) by mouth 3 (three) times daily. 30 capsule 1    predniSONE 20 MG Oral Tab Take 1 tablet (20 mg total) by mouth daily.      Meloxicam 15 MG Oral Tab Take 1 tablet (15 mg total) by mouth daily.      cyanocobalamin 1000 MCG Oral Tab Take 0.5 tablets (500 mcg total) by mouth daily.      hydrOXYzine 50 MG Oral Tab Take 1 tablet (50 mg total) by mouth nightly as needed (insomnia). 20 tablet 0    traMADol 50 MG Oral Tab Take 1 tablet (50 mg total) by mouth every 6 (six) hours as needed for Pain.      Benazepril HCl 40 MG Oral Tab Take 1 tablet (40 mg total) by mouth daily.      metoprolol succinate ER 25 MG Oral Tablet 24 Hr Take 1 tablet (25 mg total) by mouth at bedtime.      Fenofibrate 160 MG Oral Tab Take 1 tablet (160 mg total) by mouth daily.      ALPRAZolam 0.5 MG Oral Tab Take 1 tablet (0.5 mg total) by mouth nightly as needed for Sleep or Anxiety.      busPIRone 10 MG Oral Tab Take 1 tablet  (10 mg total) by mouth in the morning and 1 tablet (10 mg total) before bedtime.      atorvastatin 40 MG Oral Tab Take 1 tablet (40 mg total) by mouth nightly. 30 tablet 2    ARIPiprazole 5 MG Oral Tab Take 1 tablet (5 mg total) by mouth at bedtime.      TRAZODONE 150 MG Oral Tab Take 1 tablet (150 mg total) by mouth nightly. 90 tablet 0    venlafaxine 75 MG Oral Capsule SR 24 Hr Take 3 capsules (225 mg total) by mouth daily. 270 capsule 1       Review of Systems:   A comprehensive 14 point review of systems was completed.    Pertinent positives and negatives noted in the HPI.    Physical Exam:    /89   Pulse 63   Temp 98.6 °F (37 °C) (Temporal)   Resp 18   Ht 5' 9\" (1.753 m)   Wt 221 lb (100.2 kg)   SpO2 100%   BMI 32.64 kg/m²     General: No acute distress. Comfortable, nontoxic   HEENT: Normocephalic atraumatic. Moist mucous membranes; Sclera anicteric.  Neck: Supple, no JVD  Respiratory: Clear to auscultation bilaterally. Reg resp rate & effort, no wheezes/crackles   Cardiovascular: S1, S2. Regular rate and rhythm. No murmurs appreciable   Chest and Back: No tenderness or deformity.  Abdomen: Soft, nontender, nondistended.  Positive bowel sounds. No rebound, guarding or organomegaly.  Neurologic: No focal neurological deficits. CNII-XII grossly intact.  Musculoskeletal: Moves all extremities.  Extremities: No edema or cyanosis.  Integument: No rashes or lesions.   Psychiatric: Appropriate mood and affect.      Diagnostic Data:      Labs:  Recent Labs   Lab 03/01/24  0554 03/05/24  1211   WBC  --  6.6   HGB 11.3* 11.7*   MCV  --  89.3   PLT  --  388.0   INR  --  0.98       Recent Labs   Lab 03/05/24  1211   *   BUN 17   CREATSERUM 0.90   CA 9.5   ALB 3.6   *   K 4.2      CO2 24.0   ALKPHO 50   AST 18   ALT 21   BILT 0.4   TP 7.1       Estimated Creatinine Clearance: 65.1 mL/min (based on SCr of 0.9 mg/dL).    Recent Labs   Lab 03/05/24  1211   PTP 12.9   INR 0.98       No results  for input(s): \"TROP\", \"CK\" in the last 168 hours.    Imaging: Imaging data reviewed in Epic.      ASSESSMENT / PLAN:     Shalonda Batista Is a a 63 year old female who presents with atypical chest pain     Problem List / Diagnoses    Atypical Chest Pain   Possible GERD   Nonobstructive CAD  HTN  HL  MDD/Anxiety    Plan    Atypical Chest Pain   Nonobstructive CAD  HTN  HL  -- initial trop negative x2,will repeat to complete ACS rule out   -- EKG unremarkable, ctm on telemetry   -- Cardiology consulted, will f/u recommendations  -- Check echo   -- resume home atorvastatin, metoprolol, fenofibrate     Possible GERD   -- already on PPI daily, will increase to BID   -- symptoms may be exacerbated by recent steroid rx (has been on 20mg po prednisone PTA)    MDD/Anxiety  -- resume home aripiprazole, buspirone, trazodone, venlafaxine   -- resume nightly xanax prn     DVT Mechanical Prophylaxis:   SCDs,    DVT Pharmacologic Prophylaxis   Medication    heparin (Porcine) 5000 UNIT/ML injection 5,000 Units            Code Status: Full Code    Dispo: observation; MATEO tomorrow pending ACS rule out, Cardiology evaluation & clearance    Plan of care discussed with patient and/or family at bedside.    CHEL Hoover MD  [facility]   [phone]

## 2024-03-05 NOTE — ED QUICK NOTES
Pt requesting to sit at edge of bed, legs are cramping. Daughter at bedside, instructed to hit call life if lightheaded or dizzy

## 2024-03-05 NOTE — ED PROVIDER NOTES
Patient Seen in: [facility] Emergency Department      History     Chief Complaint   Patient presents with    Postop/Procedure Problem    Difficulty Breathing     Stated Complaint: FARA, recent left shoulder replacement.    Subjective:   HPI    Patient is 63-year-old female presents emergency room with a history of having a left shoulder replacement done with Dr. Garcia last week on Thursday, 5 days prior to arrival in the emergency room.  Patient states that she has not on any blood thinners this time.  The patient gets shortness of breath whenever she is active and also complains of some chest pain and pressure when she is laying down and feels short of breath when she is laying down.  The patient denies history of any heart problems in the past although does have a history of high blood pressure, high cholesterol, and diabetes.  The patient denies history of any recent fall or trauma.  The patient did have some bruising after her procedure which she has noted to have.  The patient denies history of any fever.  The patient denies history of any other somatic complaints or discomfort at this time.    Objective:   Past Medical History:   Diagnosis Date    Acute subdural hematoma (HCC) 01/2024    per pt currently resolved    Anxiety state     Back pain     Back problem     Depression     Diabetes (HCC)     High blood pressure     High cholesterol     Hypokalemia     Hypomagnesemia     Insomnia     Neuropathy     Osteoarthritis     Other and unspecified hyperlipidemia     Unspecified essential hypertension     Visual impairment     glasses              Past Surgical History:   Procedure Laterality Date    ANESTH,SHOULDER REPLACEMENT      right    ARTHROTOMY,OPEN REPAIR MENISCUS Left 01/13/2020    BACK SURGERY  2000/2001    laminectomy L4-5-S1    CHOLECYSTECTOMY      EXCIS LUMBAR DISK,ONE LEVEL  2015    redo L5-S1 discectomy    HYSTERECTOMY  2004    partial    OTHER      AVM malformation with coil put in place  right side of chest     SPINE SURGERY PROCEDURE UNLISTED      TOTAL KNEE REPLACEMENT Bilateral 2022    R Knee Replacement                Social History     Socioeconomic History    Marital status:    Tobacco Use    Smoking status: Former     Packs/day: 1.00     Years: 10.00     Additional pack years: 0.00     Total pack years: 10.00     Types: Cigarettes     Quit date: 3/25/2000     Years since quittin.9     Passive exposure: Never    Smokeless tobacco: Never   Vaping Use    Vaping Use: Never used   Substance and Sexual Activity    Alcohol use: No     Comment: rare    Drug use: Never    Sexual activity: Not Currently     Partners: Male     Social Determinants of Health     Food Insecurity: No Food Insecurity (2024)    Food Insecurity     Food Insecurity: Never true   Transportation Needs: No Transportation Needs (2024)    Transportation Needs     Lack of Transportation: No   Housing Stability: Low Risk  (2024)    Housing Stability     Housing Instability: No              Review of Systems    Positive for stated complaint: FARA, recent left shoulder replacement.  Other systems are as noted in HPI.  Constitutional and vital signs reviewed.      All other systems reviewed and negative except as noted above.    Physical Exam     ED Triage Vitals [24 1127]   /87   Pulse 80   Resp 16   Temp 98 °F (36.7 °C)   Temp src Temporal   SpO2 97 %   O2 Device None (Room air)       Current:/89   Pulse 63   Temp 98.6 °F (37 °C) (Temporal)   Resp 18   Ht 175.3 cm (5' 9\")   Wt 100.2 kg   SpO2 100%   BMI 32.64 kg/m²         Physical Exam  GENERAL: Well-developed, well-nourished female sitting up breathing easily in no apparent distress.  Patient is nontoxic in appearance.  HEENT: Head is normocephalic, atraumatic. Pupils are 4 mm equally round and reactive to light. Oropharynx is clear. Mucous membranes are moist.  NECK: No stridor.  LUNGS: Clear at the apices with diminished breath  sounds at both bases.  HEART: Regular rate and rhythm. Normal S1, S2 no S3, or S4. No murmur.  CHEST: There is some mild residual hematoma going into the chest wall bilaterally.  ABDOMEN: There is no focal tenderness to palpation appreciated anywhere throughout the abdomen cancers . There is no guarding, no rebound, no mass, and no organomegaly appreciated. There is normoactive bowel sounds. There is no hernia.  EXTREMITIES: There is no cyanosis, clubbing, however there is some ecchymotic change noted along the left biceps area without appreciable swelling noted in the right or left upper extremity.  There is no other edema appreciated throughout the upper or lower extremities appreciated.  Pulses are 2+ and equal in all 4 extremities.  NEURO: Patient is awake, alert and oriented to time place and person. Motor strength is 5 over 5 in all 4 extremities. There are no gross motor or sensory deficits appreciated.  Patient answering all questions appropriately.           ED Course     Labs Reviewed   COMP METABOLIC PANEL (14) - Abnormal; Notable for the following components:       Result Value    Glucose 125 (*)     Sodium 135 (*)     All other components within normal limits   PRO BETA NATRIURETIC PEPTIDE - Abnormal; Notable for the following components:    Pro-Beta Natriuretic Peptide 750 (*)     All other components within normal limits   PTT, ACTIVATED - Abnormal; Notable for the following components:    PTT 21.3 (*)     All other components within normal limits   CBC W/ DIFFERENTIAL - Abnormal; Notable for the following components:    HGB 11.7 (*)     All other components within normal limits   TROPONIN I HIGH SENSITIVITY - Normal   PROTHROMBIN TIME (PT) - Normal   RAPID SARS-COV-2 BY PCR - Normal   CBC WITH DIFFERENTIAL WITH PLATELET    Narrative:     The following orders were created for panel order CBC With Differential With Platelet.  Procedure                               Abnormality         Status                      ---------                               -----------         ------                     CBC W/ DIFFERENTIAL[[id number]]          Abnormal            Final result                 Please view results for these tests on the individual orders.   RAINBOW DRAW LAVENDER     EKG    Rate, intervals and axes as noted on EKG Report.  Rate: 69  Rhythm: Sinus Rhythm  Reading: No acute ischemic change noted         I personally viewed the patient's chest x-ray my dividual interpretation shows no evidence of any acute pneumothorax.  I also reviewed the official radiology report which showed results as noted below.        CT ANGIOGRAPHY, CHEST (CPT=71275)    Result Date: 3/5/2024  CONCLUSION:  1. No pulmonary embolism.  No thoracic aortic dissection or aneurysm. 2. Postoperative changes of recent left shoulder arthroplasty.  There is a gas and fluid collection within the subcutaneous soft tissues anterior to the left shoulder measuring 4.4 x 3.0 cm. 3. Old granulomatous disease. 4. A coil pack identified in the right middle lobe may reflect sequelae of previous AVM treatment.  Correlate with clinical history. 5. Coronary artery calcifications are noted.  Correlate with history and symptoms. 6. Mild dependent atelectasis noted in the lower lobes.  Chronic rounded atelectasis seen in the left lingula. 7. Other incidental findings noted as detailed above.  Please see above for details.     LOCATION:  [facility]   Dictated by (CST): Ajith Strauss MD on 3/05/2024 at 4:19 PM     Finalized by (CST): Ajith Strauss MD on 3/05/2024 at 4:29 PM       XR CHEST AP PORTABLE  (CPT=71045)    Result Date: 3/5/2024  CONCLUSION:  No acute cardiopulmonary process.   LOCATION:  [facility]      Dictated by (CST): Kory Russo DO on 3/05/2024 at 1:02 PM     Finalized by (CST): Kory Russo DO on 3/05/2024 at 1:03 PM                    MDM          14:02 patient resting comfortably at this time.  Patient with no other new complaints this time.   Will continue to observe at this time.  16:34 patient sitting back and breathing easily in no apparent distress at this time.  Patient with no complaints of any pain at this time.  Will continue to observe with plans to admit at this time.  Admission disposition: 3/5/2024  4:43 PM       Patient had an IV line established blood were drawn including CBC, chemistries, BUN/creatinine, and blood sugar all of which are unremarkable.  Liver function test and troponin are found to be negative.  BNP is elevated at 750.  Coags are unremarkable.  COVID test is found to be negative.  Patient placed on continuous pulse ox and cardiac monitor.  Patient given aspirin here in the emergency room.  Patient underwent CT and x-ray as noted above.  Differential diagnosis considered myself includes but is not limited to acute pneumothorax, acute pulmonary embolism, acute congestive heart failure.  The patient will be admitted to the hospital for further workup she has multiple risk factors for heart disease including high blood pressure, diabetes, and high cholesterol.  The patient does have evidence of coronary calcifications on CT scan.  Patient's case discussed with the caprice hospitalist as well as Dr. Upton from cardiology.  Patient will be admitted for further care at this time.                               Medical Decision Making      Disposition and Plan     Clinical Impression:  1. Chest pain, atypical    2. Exertional dyspnea         Disposition:  Admit  3/5/2024  4:43 pm    Follow-up:  No follow-up provider specified.        Medications Prescribed:  Current Discharge Medication List                            Hospital Problems       Present on Admission  Date Reviewed: 3/1/2024            ICD-10-CM Noted POA    * (Principal) Chest pain, atypical R07.89 3/5/2024 Unknown

## 2024-03-05 NOTE — ED QUICK NOTES
Orders for admission, patient is aware of plan and ready to go upstairs. Any questions, please call ED RN John at extension [phone].     Patient Covid vaccination status: Fully vaccinated     COVID Test Ordered in ED: Rapid SARS-CoV-2 by PCR    COVID Suspicion at Admission: N/A    Running Infusions:  None    Mental Status/LOC at time of transport: a/ox4    Other pertinent information:   CIWA score: N/A   NIH score:  N/A

## 2024-03-05 NOTE — ED QUICK NOTES
IV unable to flush in CT, attempted to restart second IV with no success. Pt back to room, CT states to call with notification when IV in place. Cain PCT to bedside for US IV

## 2024-03-05 NOTE — ED INITIAL ASSESSMENT (HPI)
Left shoulder replacement this past Thursday. Did hold regular medications, no thinners. SOB when not sitting up. Chest pain/pressure when laying down. \"Panic attacks\" per daughter and wheezing.

## 2024-03-06 ENCOUNTER — APPOINTMENT (OUTPATIENT)
Dept: CV DIAGNOSTICS | Facility: HOSPITAL | Age: 64
End: 2024-03-06
Attending: HOSPITALIST
Payer: MEDICARE

## 2024-03-06 VITALS
DIASTOLIC BLOOD PRESSURE: 71 MMHG | OXYGEN SATURATION: 96 % | WEIGHT: 224.88 LBS | BODY MASS INDEX: 33.31 KG/M2 | TEMPERATURE: 99 F | HEART RATE: 85 BPM | SYSTOLIC BLOOD PRESSURE: 144 MMHG | RESPIRATION RATE: 14 BRPM | HEIGHT: 69 IN

## 2024-03-06 LAB
ANION GAP SERPL CALC-SCNC: 4 MMOL/L (ref 0–18)
BASOPHILS # BLD AUTO: 0.04 X10(3) UL (ref 0–0.2)
BASOPHILS NFR BLD AUTO: 0.6 %
BUN BLD-MCNC: 15 MG/DL (ref 9–23)
CALCIUM BLD-MCNC: 9.4 MG/DL (ref 8.5–10.1)
CHLORIDE SERPL-SCNC: 109 MMOL/L (ref 98–112)
CO2 SERPL-SCNC: 28 MMOL/L (ref 21–32)
CREAT BLD-MCNC: 0.76 MG/DL
EGFRCR SERPLBLD CKD-EPI 2021: 87 ML/MIN/1.73M2 (ref 60–?)
EOSINOPHIL # BLD AUTO: 0.07 X10(3) UL (ref 0–0.7)
EOSINOPHIL NFR BLD AUTO: 1 %
ERYTHROCYTE [DISTWIDTH] IN BLOOD BY AUTOMATED COUNT: 12 %
GLUCOSE BLD-MCNC: 88 MG/DL (ref 70–99)
GLUCOSE BLD-MCNC: 95 MG/DL (ref 70–99)
GLUCOSE BLD-MCNC: 98 MG/DL (ref 70–99)
HCT VFR BLD AUTO: 33.9 %
HGB BLD-MCNC: 10.7 G/DL
IMM GRANULOCYTES # BLD AUTO: 0.04 X10(3) UL (ref 0–1)
IMM GRANULOCYTES NFR BLD: 0.6 %
LYMPHOCYTES # BLD AUTO: 2.81 X10(3) UL (ref 1–4)
LYMPHOCYTES NFR BLD AUTO: 41.4 %
MCH RBC QN AUTO: 29.1 PG (ref 26–34)
MCHC RBC AUTO-ENTMCNC: 31.6 G/DL (ref 31–37)
MCV RBC AUTO: 92.1 FL
MONOCYTES # BLD AUTO: 0.57 X10(3) UL (ref 0.1–1)
MONOCYTES NFR BLD AUTO: 8.4 %
NEUTROPHILS # BLD AUTO: 3.25 X10 (3) UL (ref 1.5–7.7)
NEUTROPHILS # BLD AUTO: 3.25 X10(3) UL (ref 1.5–7.7)
NEUTROPHILS NFR BLD AUTO: 48 %
OSMOLALITY SERPL CALC.SUM OF ELEC: 292 MOSM/KG (ref 275–295)
PLATELET # BLD AUTO: 346 10(3)UL (ref 150–450)
POTASSIUM SERPL-SCNC: 3.4 MMOL/L (ref 3.5–5.1)
RBC # BLD AUTO: 3.68 X10(6)UL
SODIUM SERPL-SCNC: 141 MMOL/L (ref 136–145)
TROPONIN I SERPL HS-MCNC: 5 NG/L
WBC # BLD AUTO: 6.8 X10(3) UL (ref 4–11)

## 2024-03-06 PROCEDURE — 85025 COMPLETE CBC W/AUTO DIFF WBC: CPT | Performed by: HOSPITALIST

## 2024-03-06 PROCEDURE — 84484 ASSAY OF TROPONIN QUANT: CPT | Performed by: HOSPITALIST

## 2024-03-06 PROCEDURE — 80048 BASIC METABOLIC PNL TOTAL CA: CPT | Performed by: HOSPITALIST

## 2024-03-06 PROCEDURE — 93306 TTE W/DOPPLER COMPLETE: CPT | Performed by: HOSPITALIST

## 2024-03-06 PROCEDURE — 82962 GLUCOSE BLOOD TEST: CPT

## 2024-03-06 RX ORDER — POTASSIUM CHLORIDE 20 MEQ/1
40 TABLET, EXTENDED RELEASE ORAL EVERY 4 HOURS
Status: COMPLETED | OUTPATIENT
Start: 2024-03-06 | End: 2024-03-06

## 2024-03-06 RX ORDER — ASPIRIN 81 MG/1
81 TABLET ORAL DAILY
Qty: 90 TABLET | Refills: 0 | Status: SHIPPED | OUTPATIENT
Start: 2024-03-06

## 2024-03-06 RX ORDER — HYDROCHLOROTHIAZIDE 25 MG/1
25 TABLET ORAL DAILY
Status: DISCONTINUED | OUTPATIENT
Start: 2024-03-06 | End: 2024-03-06

## 2024-03-06 RX ORDER — ALPRAZOLAM 0.25 MG/1
0.5 TABLET ORAL ONCE
Status: COMPLETED | OUTPATIENT
Start: 2024-03-06 | End: 2024-03-06

## 2024-03-06 RX ORDER — HYDROCHLOROTHIAZIDE 25 MG/1
25 TABLET ORAL DAILY
Qty: 90 TABLET | Refills: 3 | Status: SHIPPED | OUTPATIENT
Start: 2024-03-07 | End: 2025-03-02

## 2024-03-06 RX ORDER — HYDRALAZINE HYDROCHLORIDE 25 MG/1
25 TABLET, FILM COATED ORAL EVERY 8 HOURS PRN
Status: DISCONTINUED | OUTPATIENT
Start: 2024-03-06 | End: 2024-03-06

## 2024-03-06 NOTE — PLAN OF CARE
Received pt at 0700.  Pt is A&Ox4, complaints of L shoulder pain- PRN norco/tylenol available. Pt is on room air, lungs are clear/diminished, no coughing. Pt is in NSR, no complaints of chest pain. Continent of B&B, active bowel sounds, abdomen non-tender, last BM 3-6. Pt updated with plan of care.     Approx 0945-- Dr toure at bedside, discussed ECHO results with ECHO tech, planning for discharge.     POC  - ECHO  - monitor BP     Problem: Patient/Family Goals  Goal: Patient/Family Long Term Goal  Description: Patient's Long Term Goal: to prevent readmission  3-6: Stay out of hospital     Interventions:  - take medications as prescribed  - follow ups, med compliance    - See additional Care Plan goals for specific interventions  Outcome: Progressing     Problem: PAIN - ADULT  Goal: Verbalizes/displays adequate comfort level or patient's stated pain goal  Description: INTERVENTIONS:  - Encourage pt to monitor pain and request assistance  - Assess pain using appropriate pain scale  - Administer analgesics based on type and severity of pain and evaluate response  - Implement non-pharmacological measures as appropriate and evaluate response  - Consider cultural and social influences on pain and pain management  - Manage/alleviate anxiety  - Utilize distraction and/or relaxation techniques  - Monitor for opioid side effects  - Notify MD/LIP if interventions unsuccessful or patient reports new pain  - Anticipate increased pain with activity and pre-medicate as appropriate  Outcome: Not Progressing     Problem: CARDIOVASCULAR - ADULT  Goal: Maintains optimal cardiac output and hemodynamic stability  Description: INTERVENTIONS:  - Monitor vital signs, rhythm, and trends  - Monitor for bleeding, hypotension and signs of decreased cardiac output  - Evaluate effectiveness of vasoactive medications to optimize hemodynamic stability  - Monitor arterial and/or venous puncture sites for bleeding and/or hematoma  - Assess quality  of pulses, skin color and temperature  - Assess for signs of decreased coronary artery perfusion - ex. Angina  - Evaluate fluid balance, assess for edema, trend weights  Outcome: Progressing  Goal: Absence of cardiac arrhythmias or at baseline  Description: INTERVENTIONS:  - Continuous cardiac monitoring, monitor vital signs, obtain 12 lead EKG if indicated  - Evaluate effectiveness of antiarrhythmic and heart rate control medications as ordered  - Initiate emergency measures for life threatening arrhythmias  - Monitor electrolytes and administer replacement therapy as ordered  Outcome: Progressing

## 2024-03-06 NOTE — PLAN OF CARE
Assumed care of patient at 1930. Patient is alert and orientated x4. Currently room air. Lung sounds clear/diminished. Continuous pulse ox maintained. NSR on tele. Continent of bowel and bladder. PRN pain medication given for shoulder pain. Up ad elvi. Call light within reach. Fall precautions in place.    Plan of care:  Echo    ~0547: Pt's blood pressure elevated, feeling anxious. On call hosp notified.     Problem: Patient/Family Goals  Goal: Patient/Family Long Term Goal  Description: Patient's Long Term Goal: to prevent readmission    Interventions:  - take medications as prescribed  - See additional Care Plan goals for specific interventions  Outcome: Progressing  Goal: Patient/Family Short Term Goal  Description: Patient's Short Term Goal: to be discharged    Interventions:   - Echo  - See additional Care Plan goals for specific interventions  Outcome: Progressing     Problem: PAIN - ADULT  Goal: Verbalizes/displays adequate comfort level or patient's stated pain goal  Description: INTERVENTIONS:  - Encourage pt to monitor pain and request assistance  - Assess pain using appropriate pain scale  - Administer analgesics based on type and severity of pain and evaluate response  - Implement non-pharmacological measures as appropriate and evaluate response  - Consider cultural and social influences on pain and pain management  - Manage/alleviate anxiety  - Utilize distraction and/or relaxation techniques  - Monitor for opioid side effects  - Notify MD/LIP if interventions unsuccessful or patient reports new pain  - Anticipate increased pain with activity and pre-medicate as appropriate  Outcome: Progressing

## 2024-03-06 NOTE — PROGRESS NOTES
[facility]   part of [facility]    Progress Note     Shalonda Batista Patient Status:  Observation    1960 MRN [id number]   Location [facility] 2NE-A Attending Steven Solis MD   Hosp Day # 0 PCP Coco Du DO     Follow up for: The primary encounter diagnosis was Chest pain, atypical. A diagnosis of Exertional dyspnea was also pertinent to this visit.      Interval History/Subjective:     GAY overnight  Afebrile, HDS  Serial troponins negative     ***    Vital signs:  Temp:  [98 °F (36.7 °C)-98.7 °F (37.1 °C)] 98.3 °F (36.8 °C)  Pulse:  [58-83] 63  Resp:  [12-20] 12  BP: (146-178)/(57-89) 146/60  SpO2:  [92 %-100 %] 95 %    Physical Exam:    General: NAD, Comfortable, Nontoxic   Respiratory: CTAB; reg resp rate & effort, no wheezes/crackles  Cardiovascular: S1, S2. Regular rate and rhythm. No murmurs appreciated  Abdomen: Soft, NTND, no guarding/rebound   Neurologic: No focal neurological deficits.   Extremities: No edema. ***  Skin: Dry, no rashes, ulcers or lesions     Diagnostic Data:      Labs:  Recent Labs   Lab 24  0554 24  1211 24  0611   WBC  --  6.6 6.8   HGB 11.3* 11.7* 10.7*   MCV  --  89.3 92.1   PLT  --  388.0 346.0   INR  --  0.98  --        Recent Labs   Lab 24  1211 24  0611   * 88   BUN 17 15   CREATSERUM 0.90 0.76   CA 9.5 9.4   ALB 3.6  --    * 141   K 4.2 3.4*    109   CO2 24.0 28.0   ALKPHO 50  --    AST 18  --    ALT 21  --    BILT 0.4  --    TP 7.1  --        Recent Labs   Lab 24  1211   PTP 12.9   INR 0.98       No results for input(s): \"TROP\", \"CK\" in the last 168 hours.         Imaging: Imaging data reviewed in Epic.    Medications:    potassium chloride  40 mEq Oral Q4H    ARIPiprazole  5 mg Oral Nightly    atorvastatin  40 mg Oral Nightly    lisinopril  40 mg Oral Daily    busPIRone  10 mg Oral BID    fenofibrate micronized  134 mg Oral Daily with breakfast    metoprolol succinate ER  25 mg Oral  Nightly    pregabalin  50 mg Oral TID    traZODone  150 mg Oral Nightly    venlafaxine ER  225 mg Oral Daily    predniSONE  20 mg Oral Daily    heparin  5,000 Units Subcutaneous Q8H LOUIE    pantoprazole  40 mg Oral BID AC       ASSESSMENT / PLAN:     Shalonda Batista Is a a 63 year old female who presents with ***    Problem List / Diagnoses    ***      Plan    ***    DVT Mechanical Prophylaxis:   SCDs,    DVT Pharmacologic Prophylaxis   Medication    heparin (Porcine) 5000 UNIT/ML injection 5,000 Units              Code Status: ***     Dispo: ***    Plan of care discussed with patient and/or family at bedside.    N Som Hoover MD  [facility]   [phone]      This note was created using voice recognition technology. It may include inadvertent transcriptional errors. Any such errors should be contextually interpreted and should not be taken to alter the content or the meaning.     Note to Patient: The 21st Century Cures Act makes medical notes like these available to patients in the interest of transparency. However, be advised this is a medical document. It is intended as peer to peer communication. It is written in medical language and may contain abbreviations or verbiage that are unfamiliar. It may appear blunt or direct. Medical documents are intended to carry relevant information, facts as evident, and the clinical opinion of the practitioner and not intended to be the primary source of your information.  Please refer directly to myself or clinical staff for information regarding plan of care.

## 2024-03-06 NOTE — PLAN OF CARE
NURSING ADMISSION NOTE      Patient admitted via Cart  Oriented to room.  Safety precautions initiated.  Bed in low position.  Call light in reach.    Patient admitted for chest pain .  Admission assessment complete.     Admission navigator complete.  Admission orders received and initiated.

## 2024-03-06 NOTE — PLAN OF CARE
Pt is ok to discharge per primary and consults. Discharge instructions including meds & follow ups given. Patient verbalizes understanding & questions answered. IV removed, tele monitor discontinued, all belongings taken with patient. Pt transported off unit via wheelchair to [facility].

## 2024-03-06 NOTE — PROGRESS NOTES
[facility] Group Cardiology  Consultation Note      Shalonda Batista Patient Status:  Observation    1960 MRN [id number]   Location [facility] 2NE-A Attending Steven Solis MD   Hosp Day # 0 PCP Coco Du DO     Reason for consult: SOB    Impression:  63 year old female admitted with dyspnea 6 days post L shoulder arthroplasty  CTA no PE  Troponin neg  ProBNP mildly elevated - no peripheral or pulmonary edema, not hypoxic  HTN - not controlled  HLD  Nonobstructive CAD on cath 10/22    Recommendations:  Echo reviewed, no significant changes.   Hydrochlorothiazide added for BP control   Pro BNP elevated but does not appear overtly volume overloaded clinically. No SOB at present, will forego diuresis.   No recurrent chest pain. If recurrent can check stress.   Continue home ASA, statin, lisinopril, Toprol  Recent prednisone, ? Exacerbated GERD, PPI BID trial per primary  Ok for dc home.     History of Present Illness:  Shalonda Batista is a 63 year old female with HTN, HLD, h/o atypical CP and cath  showing non obstructive CAD, who presented to [facility] on 3/5/2024 with SOB. Began shortly after L should surgery 6 days ago. Possibly worse with exertion, but felt it lying in bed as well. No overt chest pain, some vague epigastric pressure. Has not noticed edema or sudden weight gain. No fevers or coughing. No pleuritic chest pains. No palpitations or syncope.      Medications:        Past Medical History:   Diagnosis Date    Acute subdural hematoma (HCC) 2024    per pt currently resolved    Anxiety state     Back pain     Back problem     Depression     Diabetes (HCC)     High blood pressure     High cholesterol     Hypokalemia     Hypomagnesemia     Insomnia     Neuropathy     Osteoarthritis     Other and unspecified hyperlipidemia     Unspecified essential hypertension     Visual impairment     glasses       Past Surgical History:   Procedure Laterality Date    ANESTH,SHOULDER  REPLACEMENT      right    ARTHROTOMY,OPEN REPAIR MENISCUS Left 2020    BACK SURGERY      laminectomy L4-5-S1    CHOLECYSTECTOMY      EXCIS LUMBAR DISK,ONE LEVEL  2015    redo L5-S1 discectomy    HYSTERECTOMY  2004    partial    OTHER      AVM malformation with coil put in place right side of chest     SPINE SURGERY PROCEDURE UNLISTED      TOTAL KNEE REPLACEMENT Bilateral 2022    R Knee Replacement    TOTAL SHOULDER REPLACEMENT Left 2024       Family History  family history includes Breast Cancer (age of onset: 65) in her sister; Cancer in her maternal grandmother and son; Cancer (age of onset: 79) in her father; Heart Disorder (age of onset: 56) in her brother; Heart Disorder (age of onset: 75) in her paternal grandmother; Hypertension in her brother; Other in her maternal grandfather and son; Psychiatric in her sister.    Social History   reports that she quit smoking about 23 years ago. Her smoking use included cigarettes. She has a 10 pack-year smoking history. She has never been exposed to tobacco smoke. She has never used smokeless tobacco. She reports that she does not drink alcohol and does not use drugs.     Allergies  Allergies   Allergen Reactions    Oxycodone NAUSEA ONLY, DIZZINESS and FACE FLUSHING     Nausea, dizziness         Review of Systems:  As per HPI, otherwise 10 point ROS is negative in detail.    Physical Exam:  Blood pressure 144/71, pulse 85, temperature 98.6 °F (37 °C), temperature source Oral, resp. rate 14, height 69\", weight 224 lb 13.9 oz (102 kg), SpO2 96%, not currently breastfeeding.  Temp (24hrs), Av.5 °F (36.9 °C), Min:98.2 °F (36.8 °C), Max:98.7 °F (37.1 °C)    Wt Readings from Last 3 Encounters:   24 224 lb 13.9 oz (102 kg)   24 221 lb (100.2 kg)   24 228 lb (103.4 kg)       General: Awake and alert; in no acute distress  HEENT: Extraocular movements are intact; sclerae are anicteric; scalp is atraumatic  Neck: Supple; no JVD; no  carotid bruits  Cardiac: Regular rate and regular rhythm; normal S1 and S2, no murmurs, rubs, or gallops are appreciated  Lungs: Clear to auscultation bilaterally; no accessory muscle use is noted, no wheezes, rhonci or rales  Abdomen: Soft, non-distended, non-tender; bowel sounds are normoactive  Extremities: Warm, no edema, clubbing or cyanosis; moves all 4 extremities normally, distal pulses intact and equal  Psychiatric: Normal mood and affect; answers questions appropriately  Dermatologic: No rashes; normal skin turgor    Diagnostic testing:    Labs:   Lab Results   Component Value Date    PT 12.8 09/01/2013    PT 12.9 01/03/2013    INR 0.98 03/05/2024    INR 1.07 01/22/2024        Lab Results   Component Value Date    WBC 6.8 03/06/2024    HGB 10.7 03/06/2024    HCT 33.9 03/06/2024    .0 03/06/2024    CREATSERUM 0.76 03/06/2024    BUN 15 03/06/2024     03/06/2024    K 3.4 03/06/2024     03/06/2024    CO2 28.0 03/06/2024    GLU 88 03/06/2024    CA 9.4 03/06/2024    PGLU 98 03/06/2024       Cardiac diagnostics:    EKG 3/5/2024:   Normal sinus rhythm   Cannot rule out Inferior infarct , age undetermined     CTA chest 3/5/2024   1. No pulmonary embolism.  No thoracic aortic dissection or aneurysm.   2. Postoperative changes of recent left shoulder arthroplasty.  There is a gas and fluid collection within the subcutaneous soft tissues anterior to the left shoulder measuring 4.4 x 3.0 cm.   3. Old granulomatous disease.   4. A coil pack identified in the right middle lobe may reflect sequelae of previous AVM treatment.  Correlate with clinical history.   5. Coronary artery calcifications are noted.  Correlate with history and symptoms.   6. Mild dependent atelectasis noted in the lower lobes.  Chronic rounded atelectasis seen in the left lingula.   7. Other incidental findings noted as detailed above.  Please see above for details.        Cath 10/27/22  1. Left main: Normal   2. LAD: Transapical,  supplies one major branching diagonal. Luminal irregularities throughout.   3. Left circumflex: Small vessel, luminal irregularities    4. RCA: Large dominant to PDA and large RPL. Mid 40%.   5. Hemodynamics: LVEDP 10 mmHg. No aortic stenosis on pullback.     Echo 10/26/22:  1. Left ventricle: The cavity size was mildly increased. Wall thickness was      normal. Systolic function was normal. The estimated ejection fraction was      55-60%. Left ventricular diastolic function parameters were normal for      the patient's age.   2. Mitral valve: There was mild regurgitation.   3. Left atrium: The left atrium was mildly dilated.   4. Pulmonary arteries: Systolic pressure could not be accurately estimated.     Thank you for allowing our practice to participate in the care of your patient. Please do not hesitate to contact me if you have any questions.    Benjie Kaplan MD  Interventional Cardiology  [facility]  Office: [phone]

## 2024-03-06 NOTE — CONSULTS
[facility] Group Cardiology  Consultation Note      Shalonda Batista Patient Status:  Observation    1960 MRN [id number]   Location [facility] 2NE-A Attending Steven Solis MD   Hosp Day # 0 PCP Coco Du DO     Reason for consult: SOB    History of Present Illness:  Shalonda Batista is a 63 year old female with HTN, HLD, h/o atypical CP and cath '22 showing non obstructive CAD, who presented to [facility] on 3/5/2024 with SOB. Began shortly after L should surgery 6 days ago. Possibly worse with exertion, but felt it lying in bed as well. No overt chest pain, some vague epigastric pressure. Has not noticed edema or sudden weight gain. No fevers or coughing. No pleuritic chest pains. No palpitations or syncope.      Medications:  Current Facility-Administered Medications   Medication Dose Route Frequency    ALPRAZolam (Xanax) tab 0.5 mg  0.5 mg Oral Nightly PRN    ARIPiprazole (Abilify) tab 5 mg  5 mg Oral Nightly    atorvastatin (Lipitor) tab 40 mg  40 mg Oral Nightly    [START ON 3/6/2024] lisinopril (Prinivil; Zestril) tab 40 mg  40 mg Oral Daily    busPIRone (Buspar) tab 10 mg  10 mg Oral BID    [START ON 3/6/2024] fenofibrate micronized (Lofibra) cap 134 mg  134 mg Oral Daily with breakfast    hydrOXYzine (Atarax) tab 50 mg  50 mg Oral Nightly PRN    metoprolol succinate ER (Toprol XL) 24 hr tab 25 mg  25 mg Oral Nightly    pregabalin (Lyrica) cap 50 mg  50 mg Oral TID    traZODone (Desyrel) tab 150 mg  150 mg Oral Nightly    [START ON 3/6/2024] venlafaxine ER (Effexor-XR) 24 hr cap 225 mg  225 mg Oral Daily    [START ON 3/6/2024] predniSONE (Deltasone) tab 20 mg  20 mg Oral Daily    heparin (Porcine) 5000 UNIT/ML injection 5,000 Units  5,000 Units Subcutaneous Q8H LOUIE    ondansetron (Zofran) 4 MG/2ML injection 4 mg  4 mg Intravenous Q6H PRN    HYDROcodone-acetaminophen (Norco)  MG per tab 1 tablet  1 tablet Oral Q6H PRN    traMADol (Ultram) tab 50 mg  50 mg Oral Q6H PRN     acetaminophen (Tylenol) tab 650 mg  650 mg Oral Q6H PRN    [START ON 3/6/2024] pantoprazole (Protonix) DR tab 40 mg  40 mg Oral BID AC       Past Medical History:   Diagnosis Date    Acute subdural hematoma (HCC) 01/2024    per pt currently resolved    Anxiety state     Back pain     Back problem     Depression     Diabetes (HCC)     High blood pressure     High cholesterol     Hypokalemia     Hypomagnesemia     Insomnia     Neuropathy     Osteoarthritis     Other and unspecified hyperlipidemia     Unspecified essential hypertension     Visual impairment     glasses       Past Surgical History:   Procedure Laterality Date    ANESTH,SHOULDER REPLACEMENT      right    ARTHROTOMY,OPEN REPAIR MENISCUS Left 01/13/2020    BACK SURGERY  2000/2001    laminectomy L4-5-S1    CHOLECYSTECTOMY      EXCIS LUMBAR DISK,ONE LEVEL  2015    redo L5-S1 discectomy    HYSTERECTOMY  2004    partial    OTHER      AVM malformation with coil put in place right side of chest     SPINE SURGERY PROCEDURE UNLISTED      TOTAL KNEE REPLACEMENT Bilateral 05/2022    R Knee Replacement    TOTAL SHOULDER REPLACEMENT Left 02/29/2024       Family History  family history includes Breast Cancer (age of onset: 65) in her sister; Cancer in her maternal grandmother and son; Cancer (age of onset: 79) in her father; Heart Disorder (age of onset: 56) in her brother; Heart Disorder (age of onset: 75) in her paternal grandmother; Hypertension in her brother; Other in her maternal grandfather and son; Psychiatric in her sister.    Social History   reports that she quit smoking about 23 years ago. Her smoking use included cigarettes. She has a 10 pack-year smoking history. She has never been exposed to tobacco smoke. She has never used smokeless tobacco. She reports that she does not drink alcohol and does not use drugs.     Allergies  Allergies   Allergen Reactions    Oxycodone NAUSEA ONLY, DIZZINESS and FACE FLUSHING     Nausea, dizziness         Review of  Systems:  As per HPI, otherwise 10 point ROS is negative in detail.    Physical Exam:  Blood pressure (!) 174/85, pulse 74, temperature 98.7 °F (37.1 °C), temperature source Oral, resp. rate 18, height 69\", weight 224 lb 13.9 oz (102 kg), SpO2 95%, not currently breastfeeding.  Temp (24hrs), Av.4 °F (36.9 °C), Min:98 °F (36.7 °C), Max:98.7 °F (37.1 °C)    Wt Readings from Last 3 Encounters:   24 224 lb 13.9 oz (102 kg)   24 221 lb (100.2 kg)   24 228 lb (103.4 kg)       General: Awake and alert; in no acute distress  HEENT: Extraocular movements are intact; sclerae are anicteric; scalp is atraumatic  Neck: Supple; no JVD; no carotid bruits  Cardiac: Regular rate and regular rhythm; normal S1 and S2, no murmurs, rubs, or gallops are appreciated  Lungs: Clear to auscultation bilaterally; no accessory muscle use is noted, no wheezes, rhonci or rales  Abdomen: Soft, non-distended, non-tender; bowel sounds are normoactive  Extremities: Warm, no edema, clubbing or cyanosis; moves all 4 extremities normally, distal pulses intact and equal  Psychiatric: Normal mood and affect; answers questions appropriately  Dermatologic: No rashes; normal skin turgor    Diagnostic testing:    Labs:   Lab Results   Component Value Date    PT 12.8 2013    PT 12.9 2013    INR 0.98 2024    INR 1.07 2024        Lab Results   Component Value Date    WBC 6.6 2024    HGB 11.7 2024    HCT 35.0 2024    .0 2024    CREATSERUM 0.90 2024    BUN 17 2024     2024    K 4.2 2024     2024    CO2 24.0 2024     2024    CA 9.5 2024    ALB 3.6 2024    ALKPHO 50 2024    BILT 0.4 2024    TP 7.1 2024    AST 18 2024    ALT 21 2024    PTT 21.3 2024    INR 0.98 2024    PTP 12.9 2024    PGLU 93 2024       Cardiac diagnostics:    EKG 3/5/2024:   Normal sinus rhythm    Cannot rule out Inferior infarct , age undetermined     CTA chest 3/5/2024   1. No pulmonary embolism.  No thoracic aortic dissection or aneurysm.   2. Postoperative changes of recent left shoulder arthroplasty.  There is a gas and fluid collection within the subcutaneous soft tissues anterior to the left shoulder measuring 4.4 x 3.0 cm.   3. Old granulomatous disease.   4. A coil pack identified in the right middle lobe may reflect sequelae of previous AVM treatment.  Correlate with clinical history.   5. Coronary artery calcifications are noted.  Correlate with history and symptoms.   6. Mild dependent atelectasis noted in the lower lobes.  Chronic rounded atelectasis seen in the left lingula.   7. Other incidental findings noted as detailed above.  Please see above for details.        Cath 10/27/22  1. Left main: Normal   2. LAD: Transapical, supplies one major branching diagonal. Luminal irregularities throughout.   3. Left circumflex: Small vessel, luminal irregularities    4. RCA: Large dominant to PDA and large RPL. Mid 40%.   5. Hemodynamics: LVEDP 10 mmHg. No aortic stenosis on pullback.     Echo 10/26/22:  1. Left ventricle: The cavity size was mildly increased. Wall thickness was      normal. Systolic function was normal. The estimated ejection fraction was      55-60%. Left ventricular diastolic function parameters were normal for      the patient's age.   2. Mitral valve: There was mild regurgitation.   3. Left atrium: The left atrium was mildly dilated.   4. Pulmonary arteries: Systolic pressure could not be accurately estimated.     Impression:  63 year old female admitted with dyspnea 6 days post L shoulder arthroplasty  CTA no PE  Troponin neg  ProBNP mildly elevated - no peripheral or pulmonary edema, not hypoxic  HTN - not controlled  HLD  Nonobstructive CAD on cath 10/22    Recommendations:  BP's usually better - if still high by tomorrow, can add a thiazide diuretic next  Pro BNP elevated but  does not appear overtly volume overloaded clinically - if dyspnea persists, can see how she responds to some lasix  Chest pain does not seem to be a primary or prominent symptom. If develops, can check stress.   Continue home ASA, statin, lisinopril, Toprol  Recent prednisone, ? Exacerbated GERD, PPI BID trial per primary    Thank you for allowing our practice to participate in the care of your patient. Please do not hesitate to contact me if you have any questions.    Benjie Kaplan MD  Interventional Cardiology  [facility]  Office: [phone]    3/5/2024  11:29 PM    Total encounter time 75 minutes.

## 2024-03-08 ENCOUNTER — PATIENT OUTREACH (OUTPATIENT)
Dept: CASE MANAGEMENT | Age: 64
End: 2024-03-08

## 2024-03-08 NOTE — PROGRESS NOTES
[facility] request (discharged 03/06)    Dr Benjie Kaplan  CARDIOLOGY  [address]  [address]  [facility] [id number] [phone]  Follow up 2-3 weeks  LVM to call [phone] to assist w/scheduling apt

## 2024-03-12 NOTE — PROGRESS NOTES
[facility] request (discharged 03/06)     Dr Benjie Kaplan  CARDIOLOGY  [place] [address]  [address]  [facility] [address]  [phone]  Follow up 2-3 weeks  Apt made:  Tue 03/26 @4:00pm w/Tere Denton  Pt decline apt, pt advised she can't go that early, she need an apt on a Sat or any night 6pm or later  Called office back and cancelled apt  Closing encounter

## 2024-05-07 ENCOUNTER — APPOINTMENT (OUTPATIENT)
Dept: GENERAL RADIOLOGY | Age: 64
End: 2024-05-07
Attending: EMERGENCY MEDICINE
Payer: MEDICARE

## 2024-05-07 ENCOUNTER — HOSPITAL ENCOUNTER (EMERGENCY)
Age: 64
Discharge: HOME | End: 2024-05-07
Attending: EMERGENCY MEDICINE
Payer: MEDICARE

## 2024-05-07 VITALS
BODY MASS INDEX: 34.8 KG/M2 | SYSTOLIC BLOOD PRESSURE: 150 MMHG | WEIGHT: 235 LBS | HEART RATE: 74 BPM | RESPIRATION RATE: 18 BRPM | DIASTOLIC BLOOD PRESSURE: 76 MMHG | TEMPERATURE: 99 F | HEIGHT: 69 IN | OXYGEN SATURATION: 95 %

## 2024-05-07 DIAGNOSIS — S93.401A MODERATE RIGHT ANKLE SPRAIN, INITIAL ENCOUNTER: Primary | ICD-10-CM

## 2024-05-07 PROCEDURE — 73610 X-RAY EXAM OF ANKLE: CPT | Performed by: EMERGENCY MEDICINE

## 2024-05-07 PROCEDURE — 99283 EMERGENCY DEPT VISIT LOW MDM: CPT

## 2024-05-07 PROCEDURE — 99284 EMERGENCY DEPT VISIT MOD MDM: CPT

## 2024-05-07 RX ORDER — HYDROCODONE BITARTRATE AND ACETAMINOPHEN 5; 325 MG/1; MG/1
1 TABLET ORAL ONCE
Status: COMPLETED | OUTPATIENT
Start: 2024-05-07 | End: 2024-05-07

## 2024-05-07 RX ORDER — NAPROXEN 500 MG/1
500 TABLET ORAL 2 TIMES DAILY PRN
Qty: 20 TABLET | Refills: 0 | Status: SHIPPED | OUTPATIENT
Start: 2024-05-07

## 2024-05-08 NOTE — ED PROVIDER NOTES
Patient Seen in: [facility] In [place]      History     Chief Complaint   Patient presents with    Leg or Foot Injury     Stated Complaint: Patient to right ankle after fall, patient states, \"unable to put pressure now\"*    Subjective:   HPI    63-year-old female presents to the emergency department after sustaining injury to her right ankle.  Patient states that she was walking and her knee gave out.  She states that this is not unusual that her knee does give out fairly frequently.  She states this is not a new problem.  She did not strike her head neck or back.  She states that other than rolling her ankle she has no other injuries.  She not taking medications prior to arrival.  She denies any prodrome to the episode.  She otherwise feels fine currently other than the pain in her ankle.  No other acute complaints    Objective:   Past Medical History:    Acute subdural hematoma (HCC)    per pt currently resolved    Anxiety state    Back pain    Back problem    Depression    Diabetes (HCC)    High blood pressure    High cholesterol    Hypokalemia    Hypomagnesemia    Insomnia    Neuropathy    Osteoarthritis    Other and unspecified hyperlipidemia    Unspecified essential hypertension    Visual impairment    glasses              Past Surgical History:   Procedure Laterality Date    Anesth,shoulder replacement      right    Arthrotomy,open repair meniscus Left 01/13/2020    Back surgery  2000/2001    laminectomy L4-5-S1    Cholecystectomy      Excis lumbar disk,one level  2015    redo L5-S1 discectomy    Hysterectomy  2004    partial    Other      AVM malformation with coil put in place right side of chest     Spine surgery procedure unlisted      Total knee replacement Bilateral 05/2022    R Knee Replacement    Total shoulder replacement Left 02/29/2024                Social History     Socioeconomic History    Marital status:    Tobacco Use    Smoking status: Former     Current  packs/day: 0.00     Average packs/day: 1 pack/day for 10.0 years (10.0 ttl pk-yrs)     Types: Cigarettes     Start date: 3/25/1990     Quit date: 3/25/2000     Years since quittin.1     Passive exposure: Never    Smokeless tobacco: Never   Vaping Use    Vaping status: Never Used   Substance and Sexual Activity    Alcohol use: No     Comment: rare    Drug use: Never    Sexual activity: Not Currently     Partners: Male     Social Determinants of Health     Food Insecurity: No Food Insecurity (3/5/2024)    Food Insecurity     Food Insecurity: Never true   Transportation Needs: No Transportation Needs (3/5/2024)    Transportation Needs     Lack of Transportation: No   Housing Stability: Low Risk  (3/5/2024)    Housing Stability     Housing Instability: No              Review of Systems   All other systems reviewed and are negative.      Positive for stated complaint: Patient to right ankle after fall, patient states, \"unable to put pressure now\"*  Other systems are as noted in HPI.  Constitutional and vital signs reviewed.      All other systems reviewed and negative except as noted above.    Physical Exam     ED Triage Vitals [24]   /76   Pulse 74   Resp 18   Temp 98.6 °F (37 °C)   Temp src Oral   SpO2 95 %   O2 Device None (Room air)       Current Vitals:   Vital Signs  BP: 150/76  Pulse: 74  Resp: 18  Temp: 98.6 °F (37 °C)  Temp src: Oral    Oxygen Therapy  SpO2: 95 %  O2 Device: None (Room air)            Physical Exam  Vitals and nursing note reviewed. Chaperone present: Friend in room.   Constitutional:       Appearance: Normal appearance. She is well-developed.   HENT:      Head: Normocephalic and atraumatic.   Cardiovascular:      Rate and Rhythm: Normal rate and regular rhythm.      Pulses: Normal pulses.      Heart sounds: Normal heart sounds.   Pulmonary:      Effort: Pulmonary effort is normal.      Breath sounds: Normal breath sounds. No stridor. No wheezing.   Musculoskeletal:          General: Swelling, tenderness and signs of injury present.      Cervical back: Normal range of motion and neck supple. No tenderness.      Right ankle: Swelling present. Tenderness present over the lateral malleolus. No medial malleolus, base of 5th metatarsal or proximal fibula tenderness. Decreased range of motion. Normal pulse.      Right Achilles Tendon: Normal.      Left ankle: Normal.   Lymphadenopathy:      Cervical: No cervical adenopathy.   Skin:     General: Skin is warm and dry.      Capillary Refill: Capillary refill takes less than 2 seconds.      Coloration: Skin is not pale.   Neurological:      General: No focal deficit present.      Mental Status: She is alert and oriented to person, place, and time.      Cranial Nerves: No cranial nerve deficit.      Coordination: Coordination normal.              ED Course   Labs Reviewed - No data to display          XR ANKLE (MIN 3 VIEWS), RIGHT (CPT=73610)    Result Date: 5/7/2024  PROCEDURE:  XR ANKLE (MIN 3 VIEWS), RIGHT (CPT=73610)  TECHNIQUE:  Three views were obtained.  COMPARISON:  None.  INDICATIONS:  Patient to right ankle after fall, patient states, unable to put pressure now. Fall around 1500  PATIENT STATED HISTORY: (As transcribed by Technologist)  Pt c/o lateral ankle foot pain and swelling after she fell today.    FINDINGS:  No acute fracture or dislocation.  Joint spaces are maintained.  Ankle mortise is maintained.  There is lateral soft tissue swelling.  Small calcaneal plantar enthesophyte.            CONCLUSION:  No acute osseous findings.  Lateral soft tissue swelling.   LOCATION:  [facility]   Dictated by (CST): Duke Bates MD on 5/07/2024 at 8:30 PM     Finalized by (CST): Duke Bates MD on 5/07/2024 at 8:31 PM              MDM      Patient had ice applied to her ankle.  She is given a hydrocodone.  She had x-rays of her right ankle.  I personally reviewed the films not appreciate any obvious fracture.  I reviewed the radiology report  they note the soft tissue swelling that was noted on exam but did not appreciate any fractures either.  I reviewed the results with her.  Patient has a walker with her she was offered crutches and prefers to use the walker.  She was given a stirrup splint.  Patient has seen orthopedics in the past and was referred back to her previous orthopedist.  She was discharged in good condition                                   Medical Decision Making      Disposition and Plan     Clinical Impression:  1. Moderate right ankle sprain, initial encounter         Disposition:  Discharge  5/7/2024  8:49 pm    Follow-up:  Coco Du,   [address]  [address]  [facility] [address] [phone]    Schedule an appointment as soon as possible for a visit      Lombardi, John A, MD  [address]  [address]  [facility] [address] [phone]    Schedule an appointment as soon as possible for a visit            Medications Prescribed:  Discharge Medication List as of 5/7/2024  9:19 PM        START taking these medications    Details   naproxen 500 MG Oral Tab Take 1 tablet (500 mg total) by mouth 2 (two) times daily as needed., Normal, Disp-20 tablet, R-0

## 2024-05-08 NOTE — ED INITIAL ASSESSMENT (HPI)
Patient to ER after right knee gave \"out\" around  today. Patient reports pain to right ankle/right upper calf. No pain medications taken PTA. Patient denies hitting head, no loc. Patient takes a baby ASA daily.

## 2024-06-03 ENCOUNTER — OFFICE VISIT (OUTPATIENT)
Dept: FAMILY MEDICINE CLINIC | Facility: CLINIC | Age: 64
End: 2024-06-03
Payer: MEDICARE

## 2024-06-03 VITALS
TEMPERATURE: 99 F | BODY MASS INDEX: 35.31 KG/M2 | SYSTOLIC BLOOD PRESSURE: 139 MMHG | RESPIRATION RATE: 18 BRPM | WEIGHT: 225 LBS | DIASTOLIC BLOOD PRESSURE: 64 MMHG | HEART RATE: 78 BPM | HEIGHT: 67 IN | OXYGEN SATURATION: 97 %

## 2024-06-03 DIAGNOSIS — R05.1 ACUTE COUGH: ICD-10-CM

## 2024-06-03 DIAGNOSIS — J02.9 SORE THROAT: Primary | ICD-10-CM

## 2024-06-03 DIAGNOSIS — J02.9 ACUTE PHARYNGITIS, UNSPECIFIED ETIOLOGY: ICD-10-CM

## 2024-06-03 LAB
CONTROL LINE PRESENT WITH A CLEAR BACKGROUND (YES/NO): YES YES/NO
KIT LOT #: NORMAL NUMERIC

## 2024-06-03 RX ORDER — PRAZOSIN HYDROCHLORIDE 1 MG/1
CAPSULE ORAL
COMMUNITY

## 2024-06-03 RX ORDER — AMLODIPINE BESYLATE 5 MG/1
5 TABLET ORAL DAILY
COMMUNITY
Start: 2024-04-08

## 2024-06-03 NOTE — PROGRESS NOTES
CHIEF COMPLAINT:     Chief Complaint   Patient presents with    Upper Respiratory Infection     Started this morning, sore throat, cough, body aches, denies fever, no otc           HPI:   Shalonda Batista is a 64 year old female presents to clinic with complaint of sore throat. Patient has had since this morning.   Reports:  no nasal congestion, + dry cough  Reports no chills, no fever, + body aches, no headache, no upset stomach, + ear pain, no rash .    Has history of strep throat; No one is sick at home.  No known strep exposure.   Treating symptoms with none.    Current Outpatient Medications   Medication Sig Dispense Refill    hydroCHLOROthiazide 25 MG Oral Tab Take 1 tablet (25 mg total) by mouth daily. 90 tablet 3    aspirin 81 MG Oral Tab EC Take 1 tablet (81 mg total) by mouth daily. 90 tablet 0    pantoprazole 40 MG Oral Tab EC Take 1 tablet (40 mg total) by mouth every morning before breakfast. 10 tablet 0    pregabalin 50 MG Oral Cap Take 1 capsule (50 mg total) by mouth 3 (three) times daily. 30 capsule 1    cyanocobalamin 1000 MCG Oral Tab Take 0.5 tablets (500 mcg total) by mouth daily.      hydrOXYzine 50 MG Oral Tab Take 1 tablet (50 mg total) by mouth nightly as needed (insomnia). 20 tablet 0    traMADol 50 MG Oral Tab Take 1 tablet (50 mg total) by mouth every 6 (six) hours as needed for Pain.      Benazepril HCl 40 MG Oral Tab Take 1 tablet (40 mg total) by mouth daily.      metoprolol succinate ER 25 MG Oral Tablet 24 Hr Take 1 tablet (25 mg total) by mouth at bedtime.      Fenofibrate 160 MG Oral Tab Take 1 tablet (160 mg total) by mouth daily.      busPIRone 10 MG Oral Tab Take 1 tablet (10 mg total) by mouth in the morning and 1 tablet (10 mg total) before bedtime.      atorvastatin 40 MG Oral Tab Take 1 tablet (40 mg total) by mouth nightly. 30 tablet 2    ARIPiprazole 5 MG Oral Tab Take 1 tablet (5 mg total) by mouth at bedtime.      TRAZODONE 150 MG Oral Tab Take 1 tablet (150 mg total)  by mouth nightly. 90 tablet 0    venlafaxine 75 MG Oral Capsule SR 24 Hr Take 3 capsules (225 mg total) by mouth daily. 270 capsule 1    amLODIPine 5 MG Oral Tab Take 1 tablet (5 mg total) by mouth daily.      prazosin 1 MG Oral Cap Take 1 capsule (1 mg total) by mouth at bedtime.      naproxen 500 MG Oral Tab Take 1 tablet (500 mg total) by mouth 2 (two) times daily as needed. (Patient not taking: Reported on 6/3/2024) 20 tablet 0    HYDROcodone-acetaminophen (NORCO)  MG Oral Tab Take 1 tablet by mouth every 6 (six) hours as needed for Pain. (Patient not taking: Reported on 6/3/2024) 20 tablet 0    Meloxicam 15 MG Oral Tab Take 1 tablet (15 mg total) by mouth daily. (Patient not taking: Reported on 6/3/2024)      ALPRAZolam 0.5 MG Oral Tab Take 1 tablet (0.5 mg total) by mouth nightly as needed for Sleep or Anxiety.        Past Medical History:    Acute subdural hematoma (HCC)    per pt currently resolved    Anxiety state    Back pain    Back problem    Depression    Diabetes (HCC)    High blood pressure    High cholesterol    Hypokalemia    Hypomagnesemia    Insomnia    Neuropathy    Osteoarthritis    Other and unspecified hyperlipidemia    Unspecified essential hypertension    Visual impairment    glasses      Social History:  Social History     Socioeconomic History    Marital status:    Tobacco Use    Smoking status: Former     Current packs/day: 0.00     Average packs/day: 1 pack/day for 10.0 years (10.0 ttl pk-yrs)     Types: Cigarettes     Start date: 3/25/1990     Quit date: 3/25/2000     Years since quittin.2     Passive exposure: Never    Smokeless tobacco: Never   Vaping Use    Vaping status: Never Used   Substance and Sexual Activity    Alcohol use: No     Comment: rare    Drug use: Never    Sexual activity: Not Currently     Partners: Male     Social Determinants of Health     Food Insecurity: No Food Insecurity (3/5/2024)    Food Insecurity     Food Insecurity: Never true    Transportation Needs: No Transportation Needs (3/5/2024)    Transportation Needs     Lack of Transportation: No   Housing Stability: Low Risk  (3/5/2024)    Housing Stability     Housing Instability: No        REVIEW OF SYSTEMS:   GENERAL HEALTH: feels well otherwise, decreased appetite  SKIN: denies any unusual skin lesions or rashes  HEENT: See HPI  RESPIRATORY: no shortness of breath or wheezing  CARDIOVASCULAR: no chest pain or palpitations   GI: no vomiting or diarrhea  NEURO: no dizziness or lightheadedness    EXAM:   /64   Pulse 78   Temp 98.5 °F (36.9 °C)   Resp 18   Ht 5' 7\" (1.702 m)   Wt 225 lb (102.1 kg)   SpO2 97%   BMI 35.24 kg/m²   GENERAL: well developed, well nourished, in no apparent distress  SKIN: no rashes,no suspicious lesions  HEAD: atraumatic, normocephalic  EYES: conjunctiva clear  EARS: TM's clear, non-injected, no bulging, retraction, or fluid bilaterally  NOSE: nostrils patent, no nasal mucus, nasal mucosa pink and non inflamed  THROAT: oral mucosa pink, moist. Posterior pharynx minimally erythematous. No exudates. Tonsils 1+/4.  Uvula is midline.  No trismus, hoarseness, muffled voice, or stridor.    NECK: supple  LUNGS: clear to auscultation bilaterally. Breathing is non labored. No wheezing, rales or rhonchi. No decreased BS.  CARDIO: RRR without murmur  EXTREMITIES: no cyanosis, clubbing or edema  LYMPH: no anterior cervical lymphadenopathy, + bilateral shotty submandibular lymphadenopathy.  No  posterior cervical or occipital lymphadenopathy.    Recent Results (from the past 24 hour(s))   Strep A Assay W/Optic    Collection Time: 06/03/24  6:24 PM   Result Value Ref Range    Strep Grp A Screen neg Negative    Control Line Present with a clear background (yes/no) yes Yes/No    Kit Lot # [id number] Numeric    Kit Expiration Date 5/21/25 Date         ASSESSMENT AND PLAN:   Assessment: 1.   Encounter Diagnoses   Name Primary?    Sore throat Yes    Acute pharyngitis,  unspecified etiology     Acute cough      Rapid strep screen is negative     Plan: Discussed likely viral etiology. No bacterial focus on exam.  Will do at home covid test.   Comfort measures explained and discussed as listed in Patient Instructions  Follow up with PCP in 3-5 days if not improving, or to IC if condition worsens, or fever greater than or equal to 100.4 persists for 72 hours.    Verbalized understanding.    There are no Patient Instructions on file for this visit.

## 2024-06-05 ENCOUNTER — APPOINTMENT (OUTPATIENT)
Dept: GENERAL RADIOLOGY | Age: 64
End: 2024-06-05
Attending: PHYSICIAN ASSISTANT
Payer: MEDICARE

## 2024-06-05 ENCOUNTER — HOSPITAL ENCOUNTER (EMERGENCY)
Age: 64
Discharge: HOME | End: 2024-06-05
Attending: EMERGENCY MEDICINE
Payer: MEDICARE

## 2024-06-05 VITALS
OXYGEN SATURATION: 95 % | BODY MASS INDEX: 35.31 KG/M2 | WEIGHT: 225 LBS | SYSTOLIC BLOOD PRESSURE: 131 MMHG | RESPIRATION RATE: 17 BRPM | DIASTOLIC BLOOD PRESSURE: 62 MMHG | HEART RATE: 78 BPM | TEMPERATURE: 98 F | HEIGHT: 67 IN

## 2024-06-05 DIAGNOSIS — J06.9 VIRAL UPPER RESPIRATORY ILLNESS: Primary | ICD-10-CM

## 2024-06-05 LAB
POCT INFLUENZA A: NEGATIVE
POCT INFLUENZA B: NEGATIVE
SARS-COV-2 RNA RESP QL NAA+PROBE: NOT DETECTED

## 2024-06-05 PROCEDURE — 99284 EMERGENCY DEPT VISIT MOD MDM: CPT

## 2024-06-05 PROCEDURE — 71046 X-RAY EXAM CHEST 2 VIEWS: CPT | Performed by: PHYSICIAN ASSISTANT

## 2024-06-05 PROCEDURE — 87502 INFLUENZA DNA AMP PROBE: CPT | Performed by: EMERGENCY MEDICINE

## 2024-06-05 PROCEDURE — 87430 STREP A AG IA: CPT | Performed by: EMERGENCY MEDICINE

## 2024-06-05 PROCEDURE — 94640 AIRWAY INHALATION TREATMENT: CPT

## 2024-06-05 RX ORDER — ALBUTEROL SULFATE 90 UG/1
4 AEROSOL, METERED RESPIRATORY (INHALATION) ONCE
Status: COMPLETED | OUTPATIENT
Start: 2024-06-05 | End: 2024-06-05

## 2024-06-05 RX ORDER — METHYLPREDNISOLONE 4 MG/1
TABLET ORAL
Qty: 1 EACH | Refills: 0 | Status: SHIPPED | OUTPATIENT
Start: 2024-06-05

## 2024-06-05 RX ORDER — ALBUTEROL SULFATE 90 UG/1
2 AEROSOL, METERED RESPIRATORY (INHALATION) EVERY 4 HOURS PRN
Qty: 1 EACH | Refills: 0 | Status: SHIPPED | OUTPATIENT
Start: 2024-06-05 | End: 2024-07-05

## 2024-06-05 RX ORDER — BENZONATATE 100 MG/1
100 CAPSULE ORAL 3 TIMES DAILY PRN
Qty: 30 CAPSULE | Refills: 0 | Status: SHIPPED | OUTPATIENT
Start: 2024-06-05 | End: 2024-07-05

## 2024-06-06 NOTE — ED PROVIDER NOTES
Patient Seen in: [facility]      History     Chief Complaint   Patient presents with    Cough/URI    Sore Throat     Stated Complaint: chest congestion and sore throat    Subjective:   HPI    64-year-old female.  Medical history of previous subdural, diabetes, hypertension, hyperlipidemia.  3 days prior to arrival, patient had rapid onset of URI type symptoms.  Describes malaise, sore throat, chest congestion and dry cough.  No headache or myalgia.  Afebrile.  Today, she believes she is wheezing and sore throat has worsened.  No vomiting or diarrhea.    Objective:   Past Medical History:    Acute subdural hematoma (HCC)    per pt currently resolved    Anxiety state    Back pain    Back problem    Depression    Diabetes (HCC)    High blood pressure    High cholesterol    Hypokalemia    Hypomagnesemia    Insomnia    Neuropathy    Osteoarthritis    Other and unspecified hyperlipidemia    Unspecified essential hypertension    Visual impairment    glasses              Past Surgical History:   Procedure Laterality Date    Anesth,shoulder replacement      right    Arthrotomy,open repair meniscus Left 2020    Back surgery      laminectomy L4-5-S1    Cholecystectomy      Excis lumbar disk,one level  2015    redo L5-S1 discectomy    Hysterectomy  2004    partial    Other      AVM malformation with coil put in place right side of chest     Spine surgery procedure unlisted      Total knee replacement Bilateral 2022    R Knee Replacement    Total shoulder replacement Left 2024                Social History     Socioeconomic History    Marital status:    Tobacco Use    Smoking status: Former     Current packs/day: 0.00     Average packs/day: 1 pack/day for 10.0 years (10.0 ttl pk-yrs)     Types: Cigarettes     Start date: 3/25/1990     Quit date: 3/25/2000     Years since quittin.2     Passive exposure: Never    Smokeless tobacco: Never   Vaping Use    Vaping  status: Never Used   Substance and Sexual Activity    Alcohol use: No     Comment: rare    Drug use: Never    Sexual activity: Not Currently     Partners: Male     Social Determinants of Health     Food Insecurity: No Food Insecurity (3/5/2024)    Food Insecurity     Food Insecurity: Never true   Transportation Needs: No Transportation Needs (3/5/2024)    Transportation Needs     Lack of Transportation: No   Housing Stability: Low Risk  (3/5/2024)    Housing Stability     Housing Instability: No              Review of Systems    Positive for stated complaint: chest congestion and sore throat  Other systems are as noted in HPI.  Constitutional and vital signs reviewed.      All other systems reviewed and negative except as noted above.    Physical Exam     ED Triage Vitals [06/05/24 1924]   /71   Pulse 87   Resp 18   Temp 98.3 °F (36.8 °C)   Temp src Oral   SpO2 96 %   O2 Device None (Room air)       Current Vitals:   Vital Signs  BP: 143/71  Pulse: 87  Resp: 18  Temp: 98.3 °F (36.8 °C)  Temp src: Oral    Oxygen Therapy  SpO2: 96 %  O2 Device: None (Room air)            Physical Exam    Gen: Well appearing, well groomed, alert and aware x 3  Neck: Supple, full range of motion, no thyromegaly or lymphadenopathy.  Eye examination: EOMs are intact, normal conjunctival  ENT: No injection noted to the bilateral auditory canals; no loss of landmarks. Normal nasal mucosa without audible nasal congestion.  Oropharynx is patent without evidence of erythema, exudates or deviation.  No stridor to auscultation  Lung: No distress, RR, no retraction, Expiratory wheeze to the left upper lobe  Cardio: Regular rate and rhythm, normal S1-S2, no murmur appreciable  Skin: No sign of trauma, Skin warm and dry, no induration or sign of infection.  No rash noted      ED Course     Labs Reviewed   RAPID STREP A SCREEN (LC) - Normal    Narrative:     A confirmatory culture is recommended if clinically indicated.   RAPID SARS-COV-2 BY  PCR - Normal   POCT FLU TEST - Normal    Narrative:     This assay is a rapid molecular in vitro test utilizing nucleic acid amplification of influenza A and B viral RNA.           XR CHEST PA + LAT CHEST (CPT=71046)    Result Date: 6/5/2024  PROCEDURE:  XR CHEST PA + LAT CHEST (CPT=71046)  INDICATIONS:  chest congestion and sore throat  COMPARISON:  [facility] , CT, CT ANGIOGRAPHY, CHEST (CPT=71275), 3/05/2024, 3:36 PM.  [facility] , XR, XR CHEST AP PORTABLE  (CPT=71045), 3/05/2024, 12:22 PM.  TECHNIQUE:  PA and lateral chest radiographs were obtained.  PATIENT STATED HISTORY: (As transcribed by Technologist)  Patient states sore throat, congestion and cough with occasional phlegm for 3 days. Patient states history of stent placement in right lung in 1995.    FINDINGS:  Normal heart size and pulmonary vascularity. No pleural effusion or pneumothorax. No lobar consolidation.  Embolization coils projecting along the right lower lung.  Densely calcified granuloma in the lower left lung.  Mild atelectasis/scarring in the lower lungs.  Bilateral shoulder arthroplasty devices noted.  Degenerative changes in the spine.            CONCLUSION:  No lobar pneumonia or overt congestive failure.  Mild atelectasis/scarring in the lower lungs.   LOCATION:  [facility]   Dictated by (CST): Shan Tejeda MD on 6/05/2024 at 7:53 PM     Finalized by (CST): Shan Tejeda MD on 6/05/2024 at 7:54 PM       XR ANKLE (MIN 3 VIEWS), RIGHT (CPT=73610)    Result Date: 5/7/2024  PROCEDURE:  XR ANKLE (MIN 3 VIEWS), RIGHT (CPT=73610)  TECHNIQUE:  Three views were obtained.  COMPARISON:  None.  INDICATIONS:  Patient to right ankle after fall, patient states, unable to put pressure now. Fall around 1500  PATIENT STATED HISTORY: (As transcribed by Technologist)  Pt c/o lateral ankle foot pain and swelling after she fell today.    FINDINGS:  No acute fracture or dislocation.  Joint spaces are maintained.  Ankle mortise is maintained.  There is lateral soft  tissue swelling.  Small calcaneal plantar enthesophyte.            CONCLUSION:  No acute osseous findings.  Lateral soft tissue swelling.   LOCATION:  [facility]   Dictated by (CST): Duke Bates MD on 5/07/2024 at 8:30 PM     Finalized by (CST): Duke Bates MD on 5/07/2024 at 8:31 PM                 MDM      My supervising physician was involved in the management of this patient.    Isolated wheeze to the left upper lobe.  Afebrile.  No tachycardia.  96% room air    COVID and rapid strep performed.  Chest x-ray.    Respiratory technician perform spacer training.  4 puffs of albuterol administered.        CONCLUSION:  No lobar pneumonia or overt congestive failure.  Mild atelectasis/scarring in the lower lungs.   LOCATION:  [facility]   Dictated by (CST): Shan Tejeda MD on 6/05/2024 at 7:53 PM     Finalized by (CST): Shan Tejeda MD on 6/05/2024 at 7:54 PM       Influenza, COVID and strep are negative      Patient placed on a steroid course.  Continue albuterol.  Tessalon.  Push clear fluids.  Monitor for fever worsening.  Primary care follow-up          Push clear fluids.  Tessalon for cough suppression.  2 puffs of inhaler every 3-4 hours.  Follow steroid taper as written.  If you develop worsening including fever, vivid night sweats, chest pain or shortness of breath please seek reevaluation.         Medical Decision Making      Disposition and Plan     Clinical Impression:  1. Viral upper respiratory illness         Disposition:  There is no disposition on file for this visit.  There is no disposition time on file for this visit.    Follow-up:  Coco Du DO  [address] [facility]  [address] [place] [id number]  [phone]    Follow up            Medications Prescribed:  Current Discharge Medication List        START taking these medications    Details   methylPREDNISolone (MEDROL) 4 MG Oral Tablet Therapy Pack Dosepack: take as directed  Qty: 1 each, Refills: 0      benzonatate 100 MG Oral Cap Take 1  capsule (100 mg total) by mouth 3 (three) times daily as needed for cough.  Qty: 30 capsule, Refills: 0      albuterol 108 (90 Base) MCG/ACT Inhalation Aero Soln Inhale 2 puffs into the lungs every 4 (four) hours as needed for Wheezing.  Qty: 1 each, Refills: 0

## 2024-06-06 NOTE — DISCHARGE INSTRUCTIONS
Push clear fluids.  Tessalon for cough suppression.  2 puffs of inhaler every 3-4 hours.  Follow steroid taper as written.  If you develop worsening including fever, vivid night sweats, chest pain or shortness of breath please seek reevaluation.

## 2024-06-06 NOTE — ED INITIAL ASSESSMENT (HPI)
Cough, congestion, and sore throat began Sunday. Today c/o wheezing while laying down. Denies fever.

## 2024-06-07 ENCOUNTER — HOSPITAL ENCOUNTER (EMERGENCY)
Facility: HOSPITAL | Age: 64
Discharge: HOME | End: 2024-06-07
Attending: EMERGENCY MEDICINE
Payer: MEDICARE

## 2024-06-07 VITALS
TEMPERATURE: 98 F | HEART RATE: 78 BPM | BODY MASS INDEX: 35.16 KG/M2 | SYSTOLIC BLOOD PRESSURE: 143 MMHG | HEIGHT: 67 IN | DIASTOLIC BLOOD PRESSURE: 88 MMHG | RESPIRATION RATE: 16 BRPM | OXYGEN SATURATION: 96 % | WEIGHT: 224 LBS

## 2024-06-07 DIAGNOSIS — J98.01 ACUTE BRONCHOSPASM: ICD-10-CM

## 2024-06-07 DIAGNOSIS — J40 SINOBRONCHITIS: Primary | ICD-10-CM

## 2024-06-07 DIAGNOSIS — J32.9 SINOBRONCHITIS: Primary | ICD-10-CM

## 2024-06-07 LAB — GLUCOSE BLD-MCNC: 105 MG/DL (ref 70–99)

## 2024-06-07 PROCEDURE — 82962 GLUCOSE BLOOD TEST: CPT

## 2024-06-07 PROCEDURE — 99284 EMERGENCY DEPT VISIT MOD MDM: CPT

## 2024-06-07 PROCEDURE — 94640 AIRWAY INHALATION TREATMENT: CPT

## 2024-06-07 RX ORDER — DOXYCYCLINE HYCLATE 100 MG/1
100 CAPSULE ORAL 2 TIMES DAILY
Qty: 10 CAPSULE | Refills: 0 | Status: SHIPPED | OUTPATIENT
Start: 2024-06-07 | End: 2024-06-12

## 2024-06-07 RX ORDER — PREDNISONE 20 MG/1
40 TABLET ORAL ONCE
Status: COMPLETED | OUTPATIENT
Start: 2024-06-07 | End: 2024-06-07

## 2024-06-07 RX ORDER — IPRATROPIUM BROMIDE AND ALBUTEROL SULFATE 2.5; .5 MG/3ML; MG/3ML
3 SOLUTION RESPIRATORY (INHALATION) ONCE
Status: COMPLETED | OUTPATIENT
Start: 2024-06-07 | End: 2024-06-07

## 2024-06-07 RX ORDER — PREDNISONE 20 MG/1
40 TABLET ORAL DAILY
Qty: 8 TABLET | Refills: 0 | Status: SHIPPED | OUTPATIENT
Start: 2024-06-08 | End: 2024-06-12

## 2024-06-07 NOTE — ED INITIAL ASSESSMENT (HPI)
Pt reports body aches, congestion, cough, sore throat, wheezing, congestion since Sunday. Pt feels short of breath. Pt denies any fever. Pt reports some phlegm when coughing. Pt denies chest pain, n/v/d.

## 2024-06-08 NOTE — ED PROVIDER NOTES
Patient Seen in: [facility] Emergency Department      History     Chief Complaint   Patient presents with    Cough/URI     Stated Complaint: Cough, congestion, seen 2 other times for same this week.    Subjective:   64-year-old female, denies any history of cardiac or pulmonary disease, non-smoker, type II diabetic well-controlled, presents with cough and congestion and sore throat and sinus congestion and wheezing ongoing for several days.  Was seen by her PCP and then by [facility] [place] where she had a chest x-ray performed and negative flu COVID and strep.  X-ray did not show any pneumonia.  She was placed on a Medrol Dosepak and inhaler and some Tessalon Perles.  States since that encounter 2 days ago her cough and her congestion and her fatigue is worse.  Not had any fevers.  No vomiting.  No chest pain.  She not short of breath but is wheezing at times with some productive cough.  No hemoptysis.  No pleurisy.  No dizziness.            Objective:   Past Medical History:    Acute subdural hematoma (HCC)    per pt currently resolved    Anxiety state    Back pain    Back problem    Depression    Diabetes (HCC)    High blood pressure    High cholesterol    Hypokalemia    Hypomagnesemia    Insomnia    Neuropathy    Osteoarthritis    Other and unspecified hyperlipidemia    Unspecified essential hypertension    Visual impairment    glasses              Past Surgical History:   Procedure Laterality Date    Anesth,shoulder replacement      right    Arthrotomy,open repair meniscus Left 01/13/2020    Back surgery  2000/2001    laminectomy L4-5-S1    Cholecystectomy      Excis lumbar disk,one level  2015    redo L5-S1 discectomy    Hysterectomy  2004    partial    Other      AVM malformation with coil put in place right side of chest     Spine surgery procedure unlisted      Total knee replacement Bilateral 05/2022    R Knee Replacement    Total shoulder replacement Left 02/29/2024                Social History      Socioeconomic History    Marital status:    Tobacco Use    Smoking status: Former     Current packs/day: 0.00     Average packs/day: 1 pack/day for 10.0 years (10.0 ttl pk-yrs)     Types: Cigarettes     Start date: 3/25/1990     Quit date: 3/25/2000     Years since quittin.2     Passive exposure: Never    Smokeless tobacco: Never   Vaping Use    Vaping status: Never Used   Substance and Sexual Activity    Alcohol use: No     Comment: rare    Drug use: Never    Sexual activity: Not Currently     Partners: Male     Social Determinants of Health     Food Insecurity: No Food Insecurity (3/5/2024)    Food Insecurity     Food Insecurity: Never true   Transportation Needs: No Transportation Needs (3/5/2024)    Transportation Needs     Lack of Transportation: No   Housing Stability: Low Risk  (3/5/2024)    Housing Stability     Housing Instability: No              Review of Systems   Constitutional:  Negative for fever.   HENT:  Positive for congestion, rhinorrhea, sinus pressure and sore throat. Negative for trouble swallowing and voice change.    Respiratory:  Positive for cough and wheezing. Negative for shortness of breath.    Cardiovascular:  Negative for chest pain.   Gastrointestinal:  Negative for abdominal pain and vomiting.   Neurological:  Negative for dizziness, facial asymmetry, weakness, light-headedness, numbness and headaches.       Positive for stated complaint: Cough, congestion, seen 2 other times for same this week.  Other systems are as noted in HPI.  Constitutional and vital signs reviewed.      All other systems reviewed and negative except as noted above.    Physical Exam     ED Triage Vitals [24 1850]   /88   Pulse 77   Resp 16   Temp 97.9 °F (36.6 °C)   Temp src Temporal   SpO2 94 %   O2 Device None (Room air)       Current Vitals:   Vital Signs  BP: 143/88  Pulse: 77  Resp: 16  Temp: 97.9 °F (36.6 °C)  Temp src: Temporal    Oxygen Therapy  SpO2: 94 %  O2 Device: None  (Room air)            Physical Exam  Vitals and nursing note reviewed.   Constitutional:       General: She is not in acute distress.     Appearance: Normal appearance. She is not ill-appearing, toxic-appearing or diaphoretic.   HENT:      Head: Normocephalic and atraumatic.      Right Ear: Tympanic membrane normal.      Left Ear: Tympanic membrane normal.      Nose: Congestion present.      Mouth/Throat:      Pharynx: Oropharynx is clear. No oropharyngeal exudate or posterior oropharyngeal erythema.   Eyes:      Extraocular Movements: Extraocular movements intact.   Cardiovascular:      Rate and Rhythm: Normal rate and regular rhythm.      Pulses: Normal pulses.      Heart sounds: Normal heart sounds.   Pulmonary:      Effort: No respiratory distress.      Breath sounds: Wheezing present.   Abdominal:      General: There is no distension.      Palpations: Abdomen is soft.      Tenderness: There is no abdominal tenderness.   Musculoskeletal:         General: Normal range of motion.      Cervical back: Normal range of motion and neck supple. No rigidity.   Skin:     General: Skin is warm and dry.   Neurological:      General: No focal deficit present.      Mental Status: She is alert and oriented to person, place, and time.      Cranial Nerves: No cranial nerve deficit.   Psychiatric:         Mood and Affect: Mood normal.         Behavior: Behavior normal.         Overall she is very well-appearing.  She has some sinus congestion.  TMs are normal overall.  Posterior pharynx is clear.  Uvula midline.  No peritonsillar abscess.  No erythema exudates or swelling.  She is moving air diffusely but is having some mild expiratory wheezing.  Her abdomen is soft.  Pulses are equal.  Neuroexam is normal and she is nontoxic-appearing    ED Course     Labs Reviewed   POCT GLUCOSE - Abnormal; Notable for the following components:       Result Value    POC Glucose 105 (*)     All other components within normal limits                       MDM      XR CHEST PA + LAT CHEST (CPT=71046)    Result Date: 6/5/2024  CONCLUSION:  No lobar pneumonia or overt congestive failure.  Mild atelectasis/scarring in the lower lungs.   LOCATION:  [facility]   Dictated by (CST): Shan Tejeda MD on 6/05/2024 at 7:53 PM     Finalized by (CST): Shan Tejeda MD on 6/05/2024 at 7:54 PM           I independent interpreted x-ray of the chest from 2 days ago, note what looks to be some atelectasis in the left base but no signs of overt pneumonia that I appreciate    Daughter at bedside helpful to provide information on the history of presenting illness    Differential diagnosis includes, but is not limited to, viral syndrome, bacterial infection, sinusitis, pneumonia    External chart review demonstrates her outpatient visit with what appears to be a PCP about 4 days ago for similar symptoms    64-year-old female presents with sinobronchitis.  Some sinus pressure and rhinorrhea.  Has some productive cough and some wheezing.  She is on Medrol Dosepak.  Blood sugar in the low 100s.  I will increase her prednisone dose as well as I had a long discussion at bedside regarding antibiotics, viral versus bacterial illness.  She really prefers and came in for antibiotics today.  With a sinobronchitis and worsening symptoms over the past week I will prescribe antibiotics but explained to her that if is just viral that they will not help.  She verbalized understanding.  She would like to try antibiotics.  She will discard her Medrol Dosepak and: 40 mg daily for the next 4 days.  She well-appearing nontoxic.  Her vital signs are stable.  Her neuroexam is normal.  She is in agreement that plan.  Discussed and considered further workup and evaluation.  Not believe that blood work would be of any benefit to her.  Did offer and consider repeat x-ray although we are going to treat her with doxycycline and she is comfortable with this plan.  Discharge home at her request, shared  decision making utilized, return precaution provided.  Did give her nebulizer treatment here for her wheezing and she feels better afterwards.    Patient was screened and evaluated during this visit.  As the treating physician attending to the patient, I determined within reasonable clinical confidence and prior to discharge, that an emergency medical condition was not or was no longer present.  There was no indication for further evaluation, treatment, or admission on an emergency basis.  Comprehensive verbal and written discharge and follow-up instructions were provided to help prevent relapse or worsening.  Patient was instructed to follow-up with their primary care provider for further evaluation and treatment, return immediately to ER for worsening, concerning, new, or changing/persisting symptoms. I discussed the case with the patient and they had no questions, complaints, or concerns.  Patient was comfortable going home.     Per the discharge paperwork, patients are encouraged to and given instructions on how to sign [facility], where they have access to their records, including any/all incidental findings.     This note was prepared using Dragon Medical voice recognition dictation software. As a result errors may occur. When identified these errors have been corrected. While every attempt is made to correct errors during dictation discrepancies may still exist    Note to patient: The 21st Century Cures Act makes medical notes like these available to patients in the interest of transparency. However, this is a medical document intended as peer to peer communication. It is written in medical language and may contain abbreviations or verbiage that are unfamiliar. It may appear blunt or direct. Medical documents are intended to carry relevant information, facts as evident, and the clinical opinion of the practitioner.                                  Medical Decision Making      Disposition and Plan      Clinical Impression:  1. Sinobronchitis    2. Acute bronchospasm         Disposition:  Discharge  6/7/2024  7:56 pm    Follow-up:  Coco Du DO  [address]  [address]  [facility] [facility] [id number] [phone]    Follow up            Medications Prescribed:  Current Discharge Medication List        START taking these medications    Details   predniSONE 20 MG Oral Tab Take 2 tablets (40 mg total) by mouth daily for 4 days.  Qty: 8 tablet, Refills: 0      doxycycline 100 MG Oral Cap Take 1 capsule (100 mg total) by mouth 2 (two) times daily for 5 days.  Qty: 10 capsule, Refills: 0

## 2025-02-19 ENCOUNTER — HOSPITAL ENCOUNTER (EMERGENCY)
Facility: HOSPITAL | Age: 65
Discharge: HOME | End: 2025-02-20
Attending: EMERGENCY MEDICINE
Payer: MEDICARE

## 2025-02-19 ENCOUNTER — APPOINTMENT (OUTPATIENT)
Dept: GENERAL RADIOLOGY | Facility: HOSPITAL | Age: 65
End: 2025-02-19
Attending: EMERGENCY MEDICINE
Payer: MEDICARE

## 2025-02-19 ENCOUNTER — APPOINTMENT (OUTPATIENT)
Dept: CT IMAGING | Facility: HOSPITAL | Age: 65
End: 2025-02-19
Attending: EMERGENCY MEDICINE
Payer: MEDICARE

## 2025-02-19 ENCOUNTER — APPOINTMENT (OUTPATIENT)
Dept: MRI IMAGING | Facility: HOSPITAL | Age: 65
End: 2025-02-19
Attending: EMERGENCY MEDICINE
Payer: MEDICARE

## 2025-02-19 DIAGNOSIS — E83.42 HYPOMAGNESEMIA: ICD-10-CM

## 2025-02-19 DIAGNOSIS — R42 DIZZINESS: Primary | ICD-10-CM

## 2025-02-19 LAB
ALBUMIN SERPL-MCNC: 4.5 G/DL (ref 3.2–4.8)
ALBUMIN/GLOB SERPL: 1.9 {RATIO} (ref 1–2)
ALP LIVER SERPL-CCNC: 42 U/L
ALT SERPL-CCNC: 21 U/L
ANION GAP SERPL CALC-SCNC: 8 MMOL/L (ref 0–18)
APTT PPP: 26.6 SECONDS (ref 23–36)
AST SERPL-CCNC: 23 U/L (ref ?–34)
BASOPHILS # BLD AUTO: 0.05 X10(3) UL (ref 0–0.2)
BASOPHILS NFR BLD AUTO: 0.7 %
BILIRUB SERPL-MCNC: 0.3 MG/DL (ref 0.2–1.1)
BILIRUB UR QL STRIP.AUTO: NEGATIVE
BUN BLD-MCNC: 12 MG/DL (ref 9–23)
CALCIUM BLD-MCNC: 9.3 MG/DL (ref 8.7–10.6)
CHLORIDE SERPL-SCNC: 107 MMOL/L (ref 98–112)
CLARITY UR REFRACT.AUTO: CLEAR
CO2 SERPL-SCNC: 24 MMOL/L (ref 21–32)
CREAT BLD-MCNC: 0.79 MG/DL
EGFRCR SERPLBLD CKD-EPI 2021: 82 ML/MIN/1.73M2 (ref 60–?)
EOSINOPHIL # BLD AUTO: 0.07 X10(3) UL (ref 0–0.7)
EOSINOPHIL NFR BLD AUTO: 1 %
ERYTHROCYTE [DISTWIDTH] IN BLOOD BY AUTOMATED COUNT: 12.9 %
FLUAV + FLUBV RNA SPEC NAA+PROBE: NEGATIVE
FLUAV + FLUBV RNA SPEC NAA+PROBE: NEGATIVE
GLOBULIN PLAS-MCNC: 2.4 G/DL (ref 2–3.5)
GLUCOSE BLD-MCNC: 79 MG/DL (ref 70–99)
GLUCOSE BLD-MCNC: 89 MG/DL (ref 70–99)
GLUCOSE UR STRIP.AUTO-MCNC: NORMAL MG/DL
HCT VFR BLD AUTO: 39.8 %
HGB BLD-MCNC: 13.5 G/DL
IMM GRANULOCYTES # BLD AUTO: 0.03 X10(3) UL (ref 0–1)
IMM GRANULOCYTES NFR BLD: 0.4 %
INR BLD: 1.05 (ref 0.8–1.2)
KETONES UR STRIP.AUTO-MCNC: NEGATIVE MG/DL
LEUKOCYTE ESTERASE UR QL STRIP.AUTO: 250
LYMPHOCYTES # BLD AUTO: 2.68 X10(3) UL (ref 1–4)
LYMPHOCYTES NFR BLD AUTO: 36.6 %
MCH RBC QN AUTO: 29.7 PG (ref 26–34)
MCHC RBC AUTO-ENTMCNC: 33.9 G/DL (ref 31–37)
MCV RBC AUTO: 87.5 FL
MONOCYTES # BLD AUTO: 0.58 X10(3) UL (ref 0.1–1)
MONOCYTES NFR BLD AUTO: 7.9 %
NEUTROPHILS # BLD AUTO: 3.92 X10 (3) UL (ref 1.5–7.7)
NEUTROPHILS # BLD AUTO: 3.92 X10(3) UL (ref 1.5–7.7)
NEUTROPHILS NFR BLD AUTO: 53.4 %
NITRITE UR QL STRIP.AUTO: NEGATIVE
OSMOLALITY SERPL CALC.SUM OF ELEC: 287 MOSM/KG (ref 275–295)
PH UR STRIP.AUTO: 5.5 [PH] (ref 5–8)
PLATELET # BLD AUTO: 323 10(3)UL (ref 150–450)
POTASSIUM SERPL-SCNC: 3.6 MMOL/L (ref 3.5–5.1)
PROT SERPL-MCNC: 6.9 G/DL (ref 5.7–8.2)
PROT UR STRIP.AUTO-MCNC: NEGATIVE MG/DL
PROTHROMBIN TIME: 13.8 SECONDS (ref 11.6–14.8)
RBC # BLD AUTO: 4.55 X10(6)UL
RBC UR QL AUTO: NEGATIVE
RSV RNA SPEC NAA+PROBE: NEGATIVE
SARS-COV-2 RNA RESP QL NAA+PROBE: NOT DETECTED
SODIUM SERPL-SCNC: 139 MMOL/L (ref 136–145)
SP GR UR STRIP.AUTO: 1.02 (ref 1–1.03)
TROPONIN I SERPL HS-MCNC: <3 NG/L
UROBILINOGEN UR STRIP.AUTO-MCNC: NORMAL MG/DL
WBC # BLD AUTO: 7.3 X10(3) UL (ref 4–11)

## 2025-02-19 PROCEDURE — 0241U SARS-COV-2/FLU A AND B/RSV BY PCR (GENEXPERT): CPT | Performed by: EMERGENCY MEDICINE

## 2025-02-19 PROCEDURE — 96375 TX/PRO/DX INJ NEW DRUG ADDON: CPT

## 2025-02-19 PROCEDURE — 85610 PROTHROMBIN TIME: CPT | Performed by: EMERGENCY MEDICINE

## 2025-02-19 PROCEDURE — 84484 ASSAY OF TROPONIN QUANT: CPT | Performed by: EMERGENCY MEDICINE

## 2025-02-19 PROCEDURE — 82962 GLUCOSE BLOOD TEST: CPT

## 2025-02-19 PROCEDURE — 87086 URINE CULTURE/COLONY COUNT: CPT | Performed by: EMERGENCY MEDICINE

## 2025-02-19 PROCEDURE — 93005 ELECTROCARDIOGRAM TRACING: CPT

## 2025-02-19 PROCEDURE — 70496 CT ANGIOGRAPHY HEAD: CPT | Performed by: EMERGENCY MEDICINE

## 2025-02-19 PROCEDURE — 70551 MRI BRAIN STEM W/O DYE: CPT | Performed by: EMERGENCY MEDICINE

## 2025-02-19 PROCEDURE — 85025 COMPLETE CBC W/AUTO DIFF WBC: CPT

## 2025-02-19 PROCEDURE — 93010 ELECTROCARDIOGRAM REPORT: CPT

## 2025-02-19 PROCEDURE — 0241U SARS-COV-2/FLU A AND B/RSV BY PCR (GENEXPERT): CPT

## 2025-02-19 PROCEDURE — 80053 COMPREHEN METABOLIC PANEL: CPT

## 2025-02-19 PROCEDURE — 70498 CT ANGIOGRAPHY NECK: CPT | Performed by: EMERGENCY MEDICINE

## 2025-02-19 PROCEDURE — 81001 URINALYSIS AUTO W/SCOPE: CPT | Performed by: EMERGENCY MEDICINE

## 2025-02-19 PROCEDURE — 85025 COMPLETE CBC W/AUTO DIFF WBC: CPT | Performed by: EMERGENCY MEDICINE

## 2025-02-19 PROCEDURE — 85730 THROMBOPLASTIN TIME PARTIAL: CPT | Performed by: EMERGENCY MEDICINE

## 2025-02-19 PROCEDURE — 83735 ASSAY OF MAGNESIUM: CPT | Performed by: EMERGENCY MEDICINE

## 2025-02-19 PROCEDURE — 80053 COMPREHEN METABOLIC PANEL: CPT | Performed by: EMERGENCY MEDICINE

## 2025-02-19 PROCEDURE — 99285 EMERGENCY DEPT VISIT HI MDM: CPT

## 2025-02-19 PROCEDURE — 71045 X-RAY EXAM CHEST 1 VIEW: CPT | Performed by: EMERGENCY MEDICINE

## 2025-02-19 PROCEDURE — 84484 ASSAY OF TROPONIN QUANT: CPT

## 2025-02-19 RX ORDER — MIRTAZAPINE 7.5 MG/1
7.5 TABLET, FILM COATED ORAL NIGHTLY
COMMUNITY

## 2025-02-19 RX ORDER — LORAZEPAM 2 MG/ML
0.5 INJECTION INTRAMUSCULAR ONCE
Status: COMPLETED | OUTPATIENT
Start: 2025-02-19 | End: 2025-02-19

## 2025-02-20 VITALS
HEART RATE: 66 BPM | TEMPERATURE: 99 F | SYSTOLIC BLOOD PRESSURE: 154 MMHG | RESPIRATION RATE: 15 BRPM | DIASTOLIC BLOOD PRESSURE: 82 MMHG | WEIGHT: 225 LBS | OXYGEN SATURATION: 93 % | BODY MASS INDEX: 33.33 KG/M2 | HEIGHT: 69 IN

## 2025-02-20 LAB — MAGNESIUM SERPL-MCNC: 1.4 MG/DL (ref 1.6–2.6)

## 2025-02-20 PROCEDURE — 96365 THER/PROPH/DIAG IV INF INIT: CPT

## 2025-02-20 RX ORDER — MECLIZINE HYDROCHLORIDE 25 MG/1
25 TABLET ORAL 3 TIMES DAILY PRN
Qty: 15 TABLET | Refills: 0 | Status: SHIPPED | OUTPATIENT
Start: 2025-02-20

## 2025-02-20 RX ORDER — MAGNESIUM SULFATE 1 G/100ML
1 INJECTION INTRAVENOUS ONCE
Status: COMPLETED | OUTPATIENT
Start: 2025-02-20 | End: 2025-02-20

## 2025-02-20 NOTE — ED INITIAL ASSESSMENT (HPI)
Generalized weakness x 2 days with dizziness. Denies any unilateral weakness or CP or nausea. Multiple falls for past few days

## 2025-02-20 NOTE — DISCHARGE INSTRUCTIONS
Rest at home  Encourage fluids at home  Return to the ER if symptoms worsen or if any other problems arise

## 2025-02-20 NOTE — ED PROVIDER NOTES
Patient Seen in: [facility] Emergency Department      History     Chief Complaint   Patient presents with    Dizziness     Stated Complaint: dizzy, off balance, sleepy since yesterday morning when she woke up at 10 am    Subjective:   HPI      Patient is a 64-year-old female presents emergency room with history of multiple complaints.  The patient has had some generalized weakness that has been ongoing intermittently for the last month she has had episodes over the last month where she would lose her footing and feels like her feet get ahead of her and she will fall and she has fallen about 4 times over the last month she has hit her head in the past as per patient.  The patient has had no history of any weakness or numbness to the arms or legs.  The patient denies history of any headache or neck pain.  The patient feels some dizziness today.  The patient denies chest pain or shortness of breath.  The patient denies history of any fever.  The patient denies history of any other somatic complaints or discomfort at this time.  Patient otherwise acting appropriately as per family at the bedside.  Patient has not been confused at home as per patient's family.    Objective:     No pertinent past medical history.            No pertinent past surgical history.              No pertinent social history.                Physical Exam     ED Triage Vitals [02/19/25 1923]   /82   Pulse 73   Resp 18   Temp 98.7 °F (37.1 °C)   Temp src Oral   SpO2 97 %   O2 Device None (Room air)       Current Vitals:   Vital Signs  BP: (!) 152/99  Pulse: 66  Resp: 13  Temp: 98.7 °F (37.1 °C)  Temp src: Oral  MAP (mmHg): (!) 113    Oxygen Therapy  SpO2: 95 %  O2 Device: None (Room air)        Physical Exam  GENERAL: Well-developed, well-nourished female sitting up breathing easily in no apparent distress.  Patient is nontoxic in appearance.  HEENT: Head is normocephalic, atraumatic. Pupils are 4 mm equally round and reactive to light.  Oropharynx is clear. Mucous membranes are moist.  Tympanic membranes are negative bilaterally.  NECK: No meningeal signs or nuchal rigidity appreciated. No stridor.  LUNGS: Clear to auscultation bilaterally with no wheeze. There is good equal air entry bilaterally.  HEART: Regular rate and rhythm. Normal S1, S2 no S3, or S4. No murmur.  ABDOMEN: There is no focal tenderness to palpation appreciated anywhere throughout the abdomen. There is no guarding, no rebound, no mass, and no organomegaly appreciated. There is normoactive bowel sounds.   EXTREMITIES: There is no cyanosis, clubbing, or edema appreciated. Pulses are 2+ and equal in all 4 extremities.  NEURO: Patient is awake, alert and oriented to time place and person. Motor strength is 5 over 5 in all 4 extremities. There are no gross motor or sensory deficits appreciated. Cranial nerves II through XII are intact.  Patient is answering all questions appropriately.          ED Course     Labs Reviewed   COMP METABOLIC PANEL (14) - Abnormal; Notable for the following components:       Result Value    Alkaline Phosphatase 42 (*)     All other components within normal limits   URINALYSIS WITH CULTURE REFLEX - Abnormal; Notable for the following components:    Leukocyte Esterase Urine 250 (*)     WBC Urine 11-20 (*)     Squamous Epi. Cells Few (*)     All other components within normal limits   MAGNESIUM - Abnormal; Notable for the following components:    Magnesium 1.4 (*)     All other components within normal limits   TROPONIN I HIGH SENSITIVITY - Normal   PROTHROMBIN TIME (PT) - Normal   PTT, ACTIVATED - Normal   POCT GLUCOSE - Normal   SARS-COV-2/FLU A AND B/RSV BY PCR (GENEXPERT) - Normal    Narrative:     This test is intended for the qualitative detection and differentiation of SARS-CoV-2, influenza A, influenza B, and respiratory syncytial virus (RSV) viral RNA in nasopharyngeal or nares swabs from individuals suspected of respiratory viral infection  consistent with COVID-19 by their healthcare provider. Signs and symptoms of respiratory viral infection due to SARS-CoV-2, influenza, and RSV can be similar.    Test performed using the Xpert Xpress SARS-CoV-2/FLU/RSV (real time RT-PCR)  assay on the GeneXpert instrument, [employer], [place] [address].   This test is being used under the Food and Drug Administration's Emergency Use Authorization.    The authorized Fact Sheet for Healthcare Providers for this assay is available upon request from the laboratory.   CBC WITH DIFFERENTIAL WITH PLATELET   RAINBOW DRAW BLUE   URINE CULTURE, ROUTINE     EKG    Rate, intervals and axes as noted on EKG Report.  Rate: 70  Rhythm: Sinus Rhythm  Reading: No acute ischemic change noted                CTA BRAIN + CTA CAROTIDS (CPT=70496/08586)    Result Date: 2/19/2025  CONCLUSION:  1. No large vessel occlusion, hemodynamically significant stenosis, dissection, or aneurysm of the major arterial vasculature of the head or neck.  2. No acute intracranial process identified.    LOCATION:  [facility]   Dictated by (CST): Jose Luis Garcia MD on 2/19/2025 at 10:58 PM     Finalized by (CST): Jose Luis Garcia MD on 2/19/2025 at 11:06 PM       XR CHEST AP PORTABLE  (CPT=71045)    Result Date: 2/19/2025  CONCLUSION:  Stable cardiac and mediastinal contours.  Chronic interstitial thickening without pulmonary edema or acute airspace disease.  Calcified granuloma left lung base.  Embolization coils right lung base.  Pleural spaces are clear.  Bilateral shoulder prostheses noted.   LOCATION:  [facility]      Dictated by (CST): Jose Luis Garcia MD on 2/19/2025 at 9:51 PM     Finalized by (CST): Jose Luis Garcia MD on 2/19/2025 at 9:51 PM          MRI of the brain was completed which showed no evidence of any acute infarct, hemorrhage, hydrocephalus, or herniation.  There is evidence of some periventricular subcortical and deep white matter T2 flair which is nonspecific per radiologist.       MDM          1:16 patient  sitting back and breathing easily in no apparent distress this time.  Patient with no other new complaints at this time.  Will discharge to home at this time.        Medical Decision Making  Patient an IV line established blood work drawn including a CBC, chemistries, BUN/creatinine, and blood sugar all of which are unremarkable.  Liver function test found to be negative.  Troponin found to be negative.  COVID, flu, RSV are all negative.  Urinalysis shows no evidence of any infection.  Magnesium found to be somewhat low at 1.4 for which the patient was given IV magnesium here in the ER.  The patient has had a history of low magnesium in the past as per patient's family.  Patient has been observed for several hours here in the emergency room.  The patient underwent CT angiogram and MRI as noted above.  Patient also underwent chest x-ray as noted above.  Patient sitting back and breathing easily in no apparent distress she has remained awake, alert, and appropriate throughout the rest of the ER stay.  Patient does not wish to be admitted to the hospital and wants to go home at this time.  The patient had a negative workup despite several weeks of symptoms as noted above.  The patient will be started empirically on meclizine for symptoms at home instructions to encourage fluids and rest at home.  Instructions to monitor symptoms closely at home and follow-up with her primary care physician regarding the magnesium level as well as follow-up for her symptoms.  The patient has been instructed to return to the ER immediately if symptoms worsen or if any other problems arise.  Patient discharged to home with no further complaints.    Disposition and Plan     Clinical Impression:  1. Dizziness    2. Hypomagnesemia         Disposition:  Discharge  2/20/2025  1:14 am    Follow-up:  Coco Du DO  [address]  [address]  [place] [address]  [phone]    Follow up in 2 day(s)            Medications  Prescribed:  Current Discharge Medication List        START taking these medications    Details   meclizine 25 MG Oral Tab Take 1 tablet (25 mg total) by mouth 3 (three) times daily as needed.  Qty: 15 tablet, Refills: 0                 Supplementary Documentation:

## 2025-02-21 LAB
ATRIAL RATE: 70 BPM
P AXIS: 44 DEGREES
P-R INTERVAL: 170 MS
Q-T INTERVAL: 388 MS
QRS DURATION: 80 MS
QTC CALCULATION (BEZET): 419 MS
R AXIS: -5 DEGREES
T AXIS: 64 DEGREES
VENTRICULAR RATE: 70 BPM

## 2025-05-13 NOTE — PROGRESS NOTES
[facility]    Progress Note    Shalonda Batista Patient Status:  Inpatient    1960 MRN [id number]   Location [facility] 3NE-A Attending Som Simpson,    Hosp Day # 2 PCP Coco Du,          A/P  Recurrent fall   Unsteady gait  History of peripheral neuropathy and osteoarthritis  Cognitive impairment  Depressive disorder     Fall and gait imbalance likely due to arthritis/knee weakness post surgery and peripheral neuropathy  No Parkinsonism or any signs of stroke.      MRI brain obtained negative for acute abnormality and no stroke seen    B12 level checked low- 202,   had hx of deficiency in the past and was following with Dr Maxi Cotton Neurology and last EMG done in     Start B12 injection 1000 mcg daily while here then weekly ( keep level 400)    Has diabetes as well and known peripheral neuropathy  May need to repeat EMG to assess for any interval worsening peripheral neuropathy     Family requesting neuropsychology inpatient given worsening memory cognition. Reports intermittent visual hallucinations since hospitalization    PT/OT evaluation     Discussed with the patient's family at bedside.        Cecily David MD  [facility] [place]            Subjective:  Shalonda Batista is a(n) 63 year old female.presents with dizziness and fall  Family reports that she is having visual hallucination while in the hospital, saw children running around. No known dementia but has been having some decline in her memory.        Objective:  Blood pressure 149/70, pulse 67, temperature 98.4 °F (36.9 °C), temperature source Oral, resp. rate 17, height 69\", weight 228 lb (103.4 kg), SpO2 92%, not currently breastfeeding.      GENERAL:  Patient is a(n) 63 year old female in no acute distress.  HEENT:  Normocephalic, atraumatic  LUNGS: Clear to auscultation bilaterally.   HEART:  S1, S2, Regular rate and rhythm.  Psych: normal mood and affect     NEUROLOGICAL:  This patient is alert  Good nutrition is important when healing from an illness, injury, or surgery.  Follow any nutrition recommendations given to you during your hospital stay.   If you were given an oral nutrition supplement while in the hospital, continue to take this supplement at home.  You can take it with meals, in-between meals, and/or before bedtime. These supplements can be purchased at most local grocery stores, pharmacies, and chain [facility]-stores.   If you have any questions about your diet or nutrition, call the hospital and ask for the dietitian.     and orientated x 3.  Speech fluent. Able to follow simple commands.    Clock drawing - 3/3   Calculations 1/3   Verbal fluency 0/1  and  Recall 1/5     CN: Face is symmetrical.   Pupils equally round and reactive to light.  3+ brisk bilaterally.  EOMs intact.  Visual fields are full.  Tongue is midline.  Uvula and palate elevate symmetrically.  Shrug shoulders normally bilaterally.  The rest of the cranial nerves are grossly intact.       Sensation to light touch is intact bilaterally. Reduce sensation to pinprick both feet     Motor: normal strength except mild right shoulder weakness from rotator surgery and left knee weakness chronic. ? Left DF flexion weakness effort limited    DTR: 1-2+ biceps and 1-2+ triceps and absent patellar bilaterally post knee replacement    Gait: unsteady gait    Assessment:  Patient Active Problem List   Diagnosis    History of back surgery    Postlaminectomy syndrome, lumbar region    Chest pain    Essential hypertension    IFG (impaired fasting glucose)    Hypertriglyceridemia    Right total knee arthroplasty  Global 04/12/2020    Effusion of right knee    Osteonecrosis of right knee region (HCC)    Acute medial meniscal tear, right, subsequent encounter - possible    Posterior knee pain, right    Right calf pain    Insufficiency fracture of medial femoral condyle (HCC)    Derangement of posterior horn of medial meniscus of right knee    Dizziness    Other closed nondisplaced fracture of proximal end of right humerus with routine healing, subsequent encounter    Hypokalemia    Recurrent falls    Acute pain of right shoulder    Right ear pain    Ceruminosis, right    Complex tear of medial meniscus of left knee as current injury, subsequent encounter    Orthopedic aftercare    Osteoarthritis of glenohumeral joint, right    Status post arthroscopic surgery of left knee    Numbness and tingling in right hand    Injury of right shoulder, initial encounter    Acute postoperative pain of  right shoulder    Posterior tibial tendon dysfunction (PTTD) of left lower extremity    BMI 38.0-38.9,adult    Gastroesophageal reflux disease without esophagitis    Acute chest pain    Pedal edema    Peripheral polyneuropathy    Severe obesity (BMI 35.0-39.9) with comorbidity (HCC)    Fatty liver disease, nonalcoholic    Chronic insomnia    Anxiety    Panic disorder    Moderate episode of recurrent major depressive disorder (HCC)    Primary osteoarthritis of left knee    B12 deficiency    Unstable angina (HCC)    Hypomagnesemia    Diarrhea, unspecified type    Dehydration    Hypernatremia    Acute subdural hematoma (HCC)    Fall, initial encounter    Injury of head, initial encounter    Laceration of scalp, initial encounter    Altered mental status, unspecified altered mental status type    Gait abnormality    Memory loss         Cecily David MD  2/18/2024  4:26 PM

## (undated) DEVICE — CONT SPEC 4OZ POLYPR GRAD LEAK

## (undated) DEVICE — WRAP COOLING KNEE W/ICE PILLOW

## (undated) DEVICE — Device: Brand: STABLECUT®

## (undated) DEVICE — SPECIMEN CONTAINER,POSITIVE SEAL INDICATOR, OR PACKAGED: Brand: PRECISION

## (undated) DEVICE — UNDYED BRAIDED (POLYGLACTIN 910), SYNTHETIC ABSORBABLE SUTURE: Brand: COATED VICRYL

## (undated) DEVICE — GOWN AERO CHROME XXL

## (undated) DEVICE — SUT VICRYL 1 OS-6 J535H

## (undated) DEVICE — PAD,ABDOMINAL,8"X7.5",ST,LF,20/BX: Brand: MEDLINE INDUSTRIES, INC.

## (undated) DEVICE — SUT VICRYL 2-0 CP-1 J266H

## (undated) DEVICE — DRAPE,U/SHT,SPLIT,FILM,60X84,STERILE: Brand: MEDLINE

## (undated) DEVICE — INHANCE SHOULDER SYSTEM HUMERAL PIN DIAMETER 3.5MM X 130MM: Brand: INHANCE

## (undated) DEVICE — BNDG COHESIVE W4INXL5YD TAN E

## (undated) DEVICE — SUTURE VICRYL 2-0 FSL

## (undated) DEVICE — 3M™ IOBAN™ 2 ANTIMICROBIAL INCISE DRAPE 6648EZ: Brand: IOBAN™ 2

## (undated) DEVICE — DRESS WOUND AQUACEL 3.5INX12IN

## (undated) DEVICE — STERILE POLYISOPRENE POWDER-FREE SURGICAL GLOVES: Brand: PROTEXIS

## (undated) DEVICE — SPK10022 SCHLEIN POSITIONING KIT: Brand: SPK10022 SCHLEIN POSITIONING KIT

## (undated) DEVICE — ATTUNE PINNING SYSTEM
Type: IMPLANTABLE DEVICE | Site: SHOULDER
Brand: ATTUNE

## (undated) DEVICE — DISPOSABLE TOURNIQUET CUFF SINGLE BLADDER, DUAL PORT AND QUICK CONNECT CONNECTOR: Brand: COLOR CUFF

## (undated) DEVICE — SUTURE VICRYL 2-0 CP-1

## (undated) DEVICE — PADDING CAST COTTON  4

## (undated) DEVICE — SOLUTION IRRIG 1000ML 0.9% NACL USP BTL

## (undated) DEVICE — SHEET,DRAPE,70X100,STERILE: Brand: MEDLINE

## (undated) DEVICE — PADDING CAST COTTON  6

## (undated) DEVICE — STERILE SYNTHETIC POLYISOPRENE POWDER-FREE SURGICAL GLOVES WITH HYDROGEL COATING, SMOOTH FINISH, STRAIGHT FINGER: Brand: PROTEXIS

## (undated) DEVICE — SOLUTION  .9 1000ML BTL

## (undated) DEVICE — CONVERTORS STOCKINETTE: Brand: CONVERTORS

## (undated) DEVICE — HOOD: Brand: FLYTE

## (undated) DEVICE — TOTAL KNEE CDS: Brand: MEDLINE INDUSTRIES, INC.

## (undated) DEVICE — WRAP COOLING SHLDR W/ICE PILLO

## (undated) DEVICE — SUTURE VICRYL 3-0

## (undated) DEVICE — 450 ML BOTTLE OF 0.05% CHLORHEXIDINE GLUCONATE IN 99.95% STERILE WATER FOR IRRIGATION, USP AND APPLICATOR.: Brand: IRRISEPT ANTIMICROBIAL WOUND LAVAGE

## (undated) DEVICE — SHOULDER SLING L

## (undated) DEVICE — KIT BEACH CHR FOAM W/ SUPP ARM DRP

## (undated) DEVICE — SUTURE NABSB OTHCRD 2 OS-6

## (undated) DEVICE — SIGMA LCS HIGH PERFORMANCE STERILE THREADED HEADED PINS: Brand: SIGMA LCS HIGH PERFORMANCE

## (undated) DEVICE — CAUTERY PENCIL

## (undated) DEVICE — APPLICATOR CHLORAPREP 26ML

## (undated) DEVICE — PADDING CAST COTTON STER 6

## (undated) DEVICE — ADHESIVE MASTISOL 2/3ML

## (undated) DEVICE — HOOD, PEEL-AWAY: Brand: FLYTE

## (undated) DEVICE — DRESSING AQUACEL AG 3.5 X 6

## (undated) DEVICE — INHANCE SHOULDER SYSTEM PERIPHERAL SCREW DRILL Ø3.2MM X 250MM: Brand: INHANCE

## (undated) DEVICE — FAN SPRAY KIT: Brand: PULSAVAC®

## (undated) DEVICE — 2T11 #2 PDO 36 X 36: Brand: 2T11 #2 PDO 36 X 36

## (undated) DEVICE — SUT FIBERWIRE 5 CCS-1 AR-7211

## (undated) DEVICE — SOL  .9 1000ML BAG

## (undated) DEVICE — LIGHT HANDLE

## (undated) DEVICE — BNDG COMPR W6INXL3YD DISP

## (undated) DEVICE — #15 STERILE STAINLESS BLADE: Brand: STERILE STAINLESS BLADES

## (undated) DEVICE — SUT VICRYL 2-0 FS-1 J443H

## (undated) DEVICE — SUTURE VCRL SZ 3-0 L18IN ABSRB UD W/O NDL

## (undated) DEVICE — 5.5MM ROUND FAST CUTTING BUR

## (undated) DEVICE — GOWN SUR 2XL LEV 4 BLU W/ WHT V NK BND AERO

## (undated) DEVICE — 3M™ STERI-STRIP™ REINFORCED ADHESIVE SKIN CLOSURES, R1547, 1/2 IN X 4 IN (12 MM X 100 MM), 6 STRIPS/ENVELOPE: Brand: 3M™ STERI-STRIP™

## (undated) DEVICE — LOWER EXTREMITY CDS-LF: Brand: MEDLINE INDUSTRIES, INC.

## (undated) DEVICE — TUBING MEGADYNE SPECULUM

## (undated) DEVICE — GLOVE SURGICAL 7.5 SENSICARE P

## (undated) DEVICE — INHANCE SHOULDER SYSTEM GLENOID PIN Ø3.5MM X 230MM: Brand: INHANCE

## (undated) DEVICE — STERILE SYNTHETIC POLYISOPRENE POWDER-FREE SURGICAL GLOVES WITH HYDROGEL COATING: Brand: PROTEXIS

## (undated) DEVICE — SUTURE VICRYL 0 CP-2

## (undated) DEVICE — DELTA XTEND GLENOID CANNULATED STOP DRILL DIA 7.5MM: Brand: DELTA XTEND

## (undated) DEVICE — SUT ORTHOCORD 2 OS-6 223103

## (undated) DEVICE — DELTA XTEND METAGLENE CENTRAL GUIDE PIN DIAMETER 2,5 X 190 MM: Brand: DELTA XTEND

## (undated) DEVICE — KIT TRC TRIMANO BEACH CHR ARM

## (undated) DEVICE — 1010 S-DRAPE TOWEL DRAPE 10/BX: Brand: STERI-DRAPE™

## (undated) DEVICE — SUPER TURBOVAC 90 IFS: Brand: COBLATION

## (undated) DEVICE — DRESSING AQUACEL AG 3.5 X 10

## (undated) DEVICE — HANDPIECE SET WITH HIGH FLOW TIP AND SUCTION TUBE: Brand: INTERPULSE

## (undated) DEVICE — INHANCE SHOULDER SYSTEM SIZER HANDLE: Brand: INHANCE

## (undated) DEVICE — STANDARD HYPODERMIC NEEDLE,POLYPROPYLENE HUB: Brand: MONOJECT

## (undated) DEVICE — E-Z BUTTON SWITCH PENCIL

## (undated) DEVICE — PAD SACRAL SPAN AID

## (undated) DEVICE — TUBING DW OUTFLOW

## (undated) DEVICE — BOWL CEMENT MIX QUICK-VAC

## (undated) DEVICE — SLEEVE KENDALL SCD EXPRESS MED

## (undated) DEVICE — SOL  .9 3000ML

## (undated) DEVICE — SUT VCRL 0 27IN CP-2 ABSRB UD L26MM 1/2 CIR

## (undated) DEVICE — SIGMA LCS HIGH PERFORMANCE INSTRUMENTS STERILE THREADED PINS: Brand: SIGMA LCS HIGH PERFORMANCE

## (undated) DEVICE — SYRINGE 30ML LL TIP

## (undated) DEVICE — 3M™ IOBAN™ 2 ANTIMICROBIAL INCISE DRAPE 6650EZ: Brand: IOBAN™ 2

## (undated) DEVICE — ESSENTIAL SHOULDER SLING M

## (undated) DEVICE — GAUZE,SPONGE,FLUFF,6"X6.75",STRL,5/TRAY: Brand: MEDLINE

## (undated) DEVICE — BANDAGE COMP PREMPRO 5YDX4IN

## (undated) DEVICE — STERILE PATIENT PROTECTIVE PAD FOR IMP® KNEE POSITIONERS & COHESIVE WRAP (10 / CASE): Brand: DE MAYO KNEE POSITIONER®

## (undated) DEVICE — 3M™ MICROFOAM™ TAPE 1528-4: Brand: 3M™ MICROFOAM™

## (undated) DEVICE — PROXIMATE SKIN STAPLERS (35 WIDE) CONTAINS 35 STAINLESS STEEL STAPLES (FIXED HEAD): Brand: PROXIMATE

## (undated) DEVICE — SOL NACL IRRIG 0.9% 1000ML BTL

## (undated) DEVICE — STOCK SUR W12XL48IN STD HLLW

## (undated) DEVICE — TUBING IRR 16FT CNT WV 3 ASCP

## (undated) DEVICE — ATTUNE PINNING SYSTEM: Brand: ATTUNE

## (undated) DEVICE — APPLICATOR CHLORAPREP 10.5ML

## (undated) DEVICE — [AGGRESSIVE PLUS CUTTER, ARTHROSCOPIC SHAVER BLADE,  DO NOT RESTERILIZE,  DO NOT USE IF PACKAGE IS DAMAGED,  KEEP DRY,  KEEP AWAY FROM SUNLIGHT]: Brand: FORMULA

## (undated) DEVICE — SUT ETHILON 3-0 PS-2 1669H

## (undated) DEVICE — SLEEVE COMPR M KNEE LEN SGL USE KENDALL SCD

## (undated) DEVICE — SHOULDER ARTHROSCOPY CDS-LF: Brand: MEDLINE INDUSTRIES, INC.

## (undated) DEVICE — GLOVE,SURG,SENSICARE SLT,LF,PF,8: Brand: MEDLINE

## (undated) DEVICE — KENDALL SCD EXPRESS SLEEVES, KNEE LENGTH, MEDIUM: Brand: KENDALL SCD

## (undated) DEVICE — ORTHO CDS-LF: Brand: MEDLINE INDUSTRIES, INC.

## (undated) DEVICE — SOL  .9 1000ML BTL

## (undated) DEVICE — COVER,MAYO STAND,STERILE: Brand: MEDLINE

## (undated) DEVICE — WRAP CLD THER COMPR UNIV BLK SHLDR FOAM REUSE

## (undated) DEVICE — GOWN,SIRUS,FABRIC-REINFORCED,X-LARGE: Brand: MEDLINE

## (undated) DEVICE — DRAIN RELIAVAC W/DRN MED 1/8

## (undated) DEVICE — SUT MONOCRYL 4-0 PS-2 Y496G

## (undated) DEVICE — STOCK SURG LG 48X9IN

## (undated) DEVICE — DRAPE,TOWEL,LARGE,INVISISHIELD: Brand: MEDLINE

## (undated) DEVICE — CANNULA 6MM TWIST AR-6535

## (undated) DEVICE — CURAD OIL EMLUSION GAUZE 3X16

## (undated) DEVICE — SOLUTION  .9 3000ML

## (undated) DEVICE — SOLUTION IRRIG 3000ML 0.9% NACL FLX CONT

## (undated) DEVICE — DELTA XTEND DRILL BIT DIAMETER 2,5 X 170 MM: Brand: DELTA XTEND

## (undated) NOTE — LETTER
June 11, 2019    Patient: Kalli Khan   Date of Visit: 6/11/2019       To Whom It May Concern:    Micheline Willams was seen and treated in our emergency department on 6/11/2019. She can return to work with these limitations:  Only sitting work [employer]

## (undated) NOTE — ED AVS SNAPSHOT
Ap Smalls   MRN: [id number]    Department:  [facility] Emergency Department   Date of Visit:  6/25/2018           Disclosure     Insurance plans vary and the physician(s) referred by the ER may not be covered by your plan.  Please contact y tell this physician (or your personal doctor if your instructions are to return to your personal doctor) about any new or lasting problems. The primary care or specialist physician will see patients referred from the [facility] Emergency Department.  Sami Calvillo

## (undated) NOTE — ED AVS SNAPSHOT
Domenica Dozier   MRN: [id number]    Department:  [facility] Emergency Department   Date of Visit:  3/3/2018           Disclosure     Insurance plans vary and the physician(s) referred by the ER may not be covered by your plan.  Please contact yo tell this physician (or your personal doctor if your instructions are to return to your personal doctor) about any new or lasting problems. The primary care or specialist physician will see patients referred from the [facility] Emergency Department.  Genoveva Dobbins

## (undated) NOTE — LETTER
Date & Time: 6/19/2019, 1:27 PM  Patient: Magi Watts  Encounter Provider(s):    [facility] Attending  [facility]       To Whom It May Concern:    Garland Felix was seen and treated in our department on 6/19/2019

## (undated) NOTE — ED AVS SNAPSHOT
[facility] Emergency Department    [place]  [address] [address]    Phone:  [phone]    Fax:  [address]   MRN: [id number]    Department:  [facility] Emergency Department   Date of Visit:  1/ coverage for follow-up care and referrals. [address] [facility] [employer] [place] [phone]  Pediatric [phone] Emergency Department   [phone]       To Check ER Wait Times:  TEXT 'ERwait' to 25314      Click [email or web address] will be contacted. Please make sure we have your correct phone number before you leave. After you leave, you should follow the attached instructions. I have read and understand the instructions given to me by my caregivers.         [facility] XR KNEE (1 OR 2 VIEWS), LEFT (CPT=73560) (Final result) Result time:  01/19/17 22:26:48    Final result    Impression:    CONCLUSION:    1. No acute process. 2. Mild distal quadriceps enthesopathy.          Dictated by: Neeta Israel DO on 1/19/2017 at 22:

## (undated) NOTE — ED AVS SNAPSHOT
Magi Watts   MRN: [id number]    Department:  [facility] Emergency Department   Date of Visit:  6/11/2019           Disclosure     Insurance plans vary and the physician(s) referred by the ER may not be covered by your plan.  Please contact y tell this physician (or your personal doctor if your instructions are to return to your personal doctor) about any new or lasting problems. The primary care or specialist physician will see patients referred from the [facility] Emergency Department.  Donna Lewis

## (undated) NOTE — ED AVS SNAPSHOT
Alan Beard   MRN: [id number]    Department:  [facility] Emergency Department   Date of Visit:  6/2/2018           Disclosure     Insurance plans vary and the physician(s) referred by the ER may not be covered by your plan.  Please contact yo tell this physician (or your personal doctor if your instructions are to return to your personal doctor) about any new or lasting problems. The primary care or specialist physician will see patients referred from the [facility] Emergency Department.  Wes Patricio

## (undated) NOTE — ED AVS SNAPSHOT
Porter Gomez   MRN: [id number]    Department:  [facility] Emergency Department   Date of Visit:  6/19/2019           Disclosure     Insurance plans vary and the physician(s) referred by the ER may not be covered by your plan.  Please contact y tell this physician (or your personal doctor if your instructions are to return to your personal doctor) about any new or lasting problems. The primary care or specialist physician will see patients referred from the [facility] Emergency Department.  Wing España

## (undated) NOTE — ED AVS SNAPSHOT
[facility] Emergency Department    [place]  [address] [place] [address]    Phone:  [phone]    Fax:  [phone]   MRN: [id number]    Department:  [facility] Emergency Department   Date of Visit:  3/ Disclosure     Insurance plans vary and the physician(s) referred by the ER may not be covered by your plan. Please contact your insurance company to determine coverage for follow-up care and referrals.     [facility] [place] [phone] prescription right away and begin taking the medication(s) as directed    If the emergency physician has read X-rays, these will be re-interpreted by a radiologist.  If there is a significant change in your reading, you will be contacted.  Please make sure Medicaid plans. To get signed up and covered, please call [phone] and ask to get set up for an insurance coverage that is in-network with [facility].         MyChart     Visit [facility]  You can access your MyChart to more actively manage

## (undated) NOTE — LETTER
[facility] Department  Phone: [phone]  Right Fax: [phone]  [place] [address] By:  Ryan Flores RN Date: 5/18/20    Patient Name: Gable Apley  Surgery Date: 5/28/2020    CSN: [id number]  Medical Record: [id number]   DO

## (undated) NOTE — LETTER
[facility] Testing Department  Phone: [phone]  Right Fax: [phone]  [facility] By: TRINIDAD Anthony RN Date: 5/21/20    Patient Name: Ernesto Farley  Surgery Date: 5/28/2020    CSN: [id number]  Medical Record: [id number]

## (undated) NOTE — LETTER
February 10, 2020    Patient: Porter Gomez   Date of Visit: 2/10/2020       To Whom It May Concern:    Pilo Mckoy was seen and treated in our emergency department on 2/10/2020. She should not return to work until February 15, 2020.     If you

## (undated) NOTE — ED AVS SNAPSHOT
Carl Speaker   MRN: [id number]    Department:  [facility] Emergency Department   Date of Visit:  8/19/2018           Disclosure     Insurance plans vary and the physician(s) referred by the ER may not be covered by your plan.  Please contact y tell this physician (or your personal doctor if your instructions are to return to your personal doctor) about any new or lasting problems. The primary care or specialist physician will see patients referred from the [facility] Emergency Department.  Emilie Tipton

## (undated) NOTE — LETTER
Date & Time: 8/20/2018, 12:10 AM  Patient: Jose Keller  Encounter Provider(s):    Kelly Sharp MD       To Whom It May Concern:    Guille Ambrose was seen and treated in our department on 8/19/2018.  She should not return to work until after

## (undated) NOTE — ED AVS SNAPSHOT
[facility] Emergency Department    [place]  [address] [place] [address]    Phone:  [phone]    Fax:  [phone]   MRN: [id number]    Department:  [facility] Emergency Department   Date of Visit:  1/ IF THERE IS ANY CHANGE OR WORSENING OF YOUR CONDITION, CALL YOUR PRIMARY CARE PHYSICIAN AT ONCE OR RETURN IMMEDIATELY TO THE EMERGENCY DEPARTMENT.     If you have been prescribed any medication(s), please fill your prescription right away and begin taking t

## (undated) NOTE — ED AVS SNAPSHOT
Cris Loss   MRN: [id number]    Department:  [facility] Emergency Department   Date of Visit:  2/10/2020           Disclosure     Insurance plans vary and the physician(s) referred by the ER may not be covered by your plan.  Please contact y tell this physician (or your personal doctor if your instructions are to return to your personal doctor) about any new or lasting problems. The primary care or specialist physician will see patients referred from the [facility] Emergency Department.  Sami Calvillo

## (undated) NOTE — LETTER
OUTSIDE TESTING RESULT REQUEST     IMPORTANT: FOR YOUR IMMEDIATE ATTENTION  Please FAX all test results listed below to: [phone]     Testing already done on or about: 11/15/22    * * * * If testing is NOT complete, arrange with patient A.S.A.P. * * * *      Patient Name: Ruel Barry  Surgery Date: 2022  CSN: [id number]  Medical Record: [id number]   : 1960 - A: 57 y      Sex: female  Surgeon(s):  Carlito Harmon MD  Procedure: LEFT TOTAL KNEE ARTHROPLASTY  Anesthesia Type: Spinal     Surgeon: Carlito Harmon MD     The following Testing and Time Line are REQUIRED PER ANESTHESIA     Abo/RH  MSSA/MRSA Nasal screening within 30 days      Thank You,   Sent by:Tanya LACEY

## (undated) NOTE — ED AVS SNAPSHOT
[facility] Emergency Department    [place]  [address] [id number]    Phone:  [phone]    Fax:  [phone]   MRN: [id number]    Department:  [facility] Emergency Department   Date of Visit:  3/ IF THERE IS ANY CHANGE OR WORSENING OF YOUR CONDITION, CALL YOUR PRIMARY CARE PHYSICIAN AT ONCE OR RETURN IMMEDIATELY TO THE EMERGENCY DEPARTMENT.     If you have been prescribed any medication(s), please fill your prescription right away and begin taking t

## (undated) NOTE — LETTER
[facility] [address], [address] [place] [address]  Consent for Procedure/Sedation  Date: 10/27/22           Time: 0857    1. I hereby authorize Ileana Berg, my physician and his/her assistants (if applicable), which may include medical students, residents, and/or fellows, to perform the following surgical operation/ procedure and administer such anesthesia as may be determined necessary by my physician:  Operation/Procedure name (s)  Cardiac Catheterization, Left Ventricular Cineangiography, Bilateral Selective Coronary Angiography and/or Right Heart Catheterization; possible Percutaneous Transluminal Coronary Angioplasty, Coronary Atherectomy, Coronary Stent, Intracoronary Thrombolytic therapy, Antiplatelet therapy and/or Intravascular Ultrasound on [address] [place] [address]   2. I recognize that during the surgical operation/procedure, unforeseen conditions may necessitate additional or different procedures than those listed above. I, therefore, further authorize and request that the above-named surgeon, assistants, or designees perform such procedures as are, in their judgment, necessary and desirable. 3.   My surgeon/physician has discussed prior to my surgery the potential benefits, risks and side effects of this procedure; the likelihood of achieving goals; and potential problems that might occur during recuperation. They also discussed reasonable alternatives to the procedure, including risks, benefits, and side effects related to the alternatives and risks related to not receiving this procedure. I have had all my questions answered and I acknowledge that no guarantee has been made as to the result that may be obtained. 4.   Should the need arise during my operation/procedure, which includes change of level of care prior to discharge, I also consent to the administration of blood and/or blood products.   Further, I understand that despite careful testing and screening of blood or blood products by collecting agencies, I may still be subject to ill effects as a result of receiving a blood transfusion and/or blood products. The following are some, but not all, of the potential risks that can occur: fever and allergic reactions, hemolytic reactions, transmission of diseases such as Hepatitis, AIDS and Cytomegalovirus (CMV) and fluid overload. In the event that I wish to have an autologous transfusion of my own blood, or a directed donor transfusion, I will discuss this with my physician. Check only if Refusing Blood or Blood Products  I understand refusal of blood or blood products as deemed necessary by my physician may have serious consequences to my condition to include possible death. I hereby assume responsibility for my refusal and release the hospital, its personnel, and my physicians from any responsibility for the consequences of my refusal.           [    ] Refuse      5. I authorize the use of any specimen, organs, tissues, body parts or foreign objects that may be removed from my body during the operation/procedure for diagnosis, research or teaching purposes and their subsequent disposal by hospital authorities. I also authorize the release of specimen test results and/or written reports to my treating physician on the hospital medical staff or other referring or consulting physicians involved in my care, at the discretion of the Pathologist or my treating physician. 6.   I consent to the photographing or videotaping of the operations or procedures to be performed, including appropriate portions of my body for medical, scientific, or educational purposes, provided my identity is not revealed by the pictures or by descriptive texts accompanying them. If the procedure has been photographed/videotaped, the surgeon will obtain the original picture, image, videotape or CD.   The hospital will not be responsible for storage, release or maintenance of the picture, image, tape or CD.    7.   I consent to the presence of a  or observers in the operating room as deemed necessary by my physician or their designees. 8.   I recognize that in the event my procedure results in extended X-Ray/fluoroscopy time, I may develop a skin reaction. 9. If I have a Do Not Attempt Resuscitation (DNAR) order in place, that status will be suspended while in the operating room, procedural suite, and during the recovery period unless otherwise explicitly stated by me (or a person authorized to consent on my behalf). The surgeon or my attending physician will determine when the applicable recovery period ends for purposes of reinstating the DNAR order. 10. Patients having a sterilization procedure: I understand that if the procedure is successful the results will be permanent and it will therefore be impossible for me to inseminate, conceive, or bear children. I also understand that the procedure is intended to result in sterility, although the result has not been guaranteed. 11. I acknowledge that my physician has explained sedation/analgesia administration to me including the risk and benefits I consent to the administration of sedation/analgesia as may be necessary or desirable in the judgment of my physician.     I CERTIFY THAT I HAVE READ AND FULLY UNDERSTAND THE ABOVE CONSENT TO OPERATION and/or OTHER PROCEDURE.        ____________________________________       _________________________________      ______________________________  Signature of Patient         Signature of Responsible Person        Printed Name of Responsible Person    ____________________________________      _________________________________      ______________________________       Signature of Witness          Relationship to Patient                       Date                                       Time  Patient Name: Orlando Hutchinson     : 1960                 Printed: 2022      Medical Record #: [id number] Page 1 of 2